# Patient Record
Sex: FEMALE | Race: WHITE | NOT HISPANIC OR LATINO | Employment: FULL TIME | URBAN - METROPOLITAN AREA
[De-identification: names, ages, dates, MRNs, and addresses within clinical notes are randomized per-mention and may not be internally consistent; named-entity substitution may affect disease eponyms.]

---

## 2017-08-14 ENCOUNTER — OFFICE VISIT (OUTPATIENT)
Dept: FAMILY MEDICINE | Facility: CLINIC | Age: 22
End: 2017-08-14
Payer: COMMERCIAL

## 2017-08-14 VITALS
WEIGHT: 133.5 LBS | DIASTOLIC BLOOD PRESSURE: 60 MMHG | RESPIRATION RATE: 12 BRPM | OXYGEN SATURATION: 99 % | SYSTOLIC BLOOD PRESSURE: 102 MMHG | HEIGHT: 61 IN | HEART RATE: 61 BPM | TEMPERATURE: 97.8 F | BODY MASS INDEX: 25.2 KG/M2

## 2017-08-14 DIAGNOSIS — G43.009 MIGRAINE WITHOUT AURA AND WITHOUT STATUS MIGRAINOSUS, NOT INTRACTABLE: ICD-10-CM

## 2017-08-14 PROCEDURE — 99213 OFFICE O/P EST LOW 20 MIN: CPT | Performed by: PHYSICIAN ASSISTANT

## 2017-08-14 RX ORDER — SUMATRIPTAN 100 MG/1
100 TABLET, FILM COATED ORAL
Qty: 9 TABLET | Refills: 1 | Status: SHIPPED | OUTPATIENT
Start: 2017-08-14 | End: 2018-08-29

## 2017-08-14 ASSESSMENT — ANXIETY QUESTIONNAIRES
3. WORRYING TOO MUCH ABOUT DIFFERENT THINGS: NEARLY EVERY DAY
1. FEELING NERVOUS, ANXIOUS, OR ON EDGE: NEARLY EVERY DAY
6. BECOMING EASILY ANNOYED OR IRRITABLE: MORE THAN HALF THE DAYS
7. FEELING AFRAID AS IF SOMETHING AWFUL MIGHT HAPPEN: NOT AT ALL
5. BEING SO RESTLESS THAT IT IS HARD TO SIT STILL: NOT AT ALL
2. NOT BEING ABLE TO STOP OR CONTROL WORRYING: NEARLY EVERY DAY
GAD7 TOTAL SCORE: 12

## 2017-08-14 ASSESSMENT — PATIENT HEALTH QUESTIONNAIRE - PHQ9
5. POOR APPETITE OR OVEREATING: SEVERAL DAYS
SUM OF ALL RESPONSES TO PHQ QUESTIONS 1-9: 9

## 2017-08-14 NOTE — MR AVS SNAPSHOT
After Visit Summary   8/14/2017    Lynn Vaca    MRN: [id number]           Patient Information     Date Of Birth          1995        Visit Information        Provider Department      8/14/2017 9:40 AM Daniella Nuñez PA-C [facility]        Today's Diagnoses     Migraine without aura and without status migrainosus, not intractable          Care Instructions      * Migraine Headache  Migraine headaches are related to changes in blood flow to the brain. This causes throbbing or constant pain on one or both sides of the head. The pain may last from a few hours to several days. There is usually nausea, vomiting, sensitivity to light and sound, and blurred vision. A migraine attack may be triggered by emotional stress, hormone changes during the menstrual cycle, oral contraceptives, alcohol use, certain foods containing tyramine, eye strain, weather changes, missing meals, or too little or too much sleep.  Home Care For This Headache:  1) If you were given pain medicine for this headache, do not drive yourself home . Arrange for a ride, instead. When you get home, try to sleep. You should feel much better when you wake up.  2) Migraine headaches may improve with an ice pack on the forehead or at the base of the skull. Heat to the back of your neck may relieve any neck spasm.  3) Drink only clear liquids or eat a very light diet to avoid nausea/vomiting until symptoms improve.  Preventing Future Headaches:  1) Pay attention to those factors that seem to trigger your headache. Try to avoid them when you can. If you have frequent headaches, it is useful to keep a diary of what you were doing, feeling or eating in the hours before each attack. Show this to your doctor to help find the cause of your headaches.  a) If you feel that stress is a factor in your headaches, look at the sources of stress in your life. Find ways to release the build-up of those stresses by using  regular exercise, relaxation methods (yoga, meditation), bio-feedback or simply taking time-out for yourself. For more information about this, consult your doctor or go to a local bookstore and review books and tapes on this subject.  b) Tyramine is a substance present in the following foods : chocolate, yogurt, all cheeses except cottage cheese and cream cheese. smoked or pickled fish and meat (including herring, caviar, bologna, pepperoni, salami), liver, avocados, bananas, figs, raisins, and red wine. Be aware that these foods may trigger a migraine in some persons. Try taking these foods out of your diet for 1-2 months to see if this reduces headache frequency.  Treating Future Attacks:  1) At the first sign of a migraine headache, take a medicine to stop it if one has been prescribed for you. If not, take acetaminophen (Tylenol) or ibuprofen (Motrin, Advil) if you are able to take these. The sooner you take medicine, the better it will work.  2) You may also want to find a quiet, dark, comfortable place to sit or lie down. Let yourself relax or sleep.  3) An ice pack on the forehead or area of greatest pain may also help.   Follow Up  with your doctor if the headache is not better within the next 24 hours. If you have frequent headaches you should discuss a treatment plan with your primary care doctor. Ask if you can have medicine to take at home the next time you get a bad headache. Poorly controlled chronic headaches may require a referral to a neurologist (headache specialist).  Get Prompt Medical Attention  if any of the following occur:    Your head pain gets worse, or does not improve within 24 hours    Repeated vomiting (can t keep liquids down)    Sinus or ear or throat pain (not already reported)    Fever of 101  F (38.3  C) or higher, or as directed by your healthcare provider    Stiff neck    Extreme drowsiness, confusion or fainting    Weakness of an arm or leg or one side of the face    Difficulty  with speech or vision    0935-0122 [employer]. [address] [place] [address], [place] [address]. All rights reserved. This information is not intended as a substitute for professional medical care. Always follow your healthcare professional's instructions.          Follow-ups after your visit        Additional Services     NEUROLOGY ADULT REFERRAL       Your provider has referred you for the following:   Consult at [facility]: [facility] [phone]   [email or web address]  FM: [facility] [phone]   [email or web address]    Please be aware that coverage of these services is subject to the terms and limitations of your health insurance plan.  Call member services at your health plan with any benefit or coverage questions.      Please bring the following with you to your appointment:    (1) Any X-Rays, CTs or MRIs which have been performed.  Contact the facility where they were done to arrange for  prior to your scheduled appointment.    (2) List of current medications  (3) This referral request   (4) Any documents/labs given to you for this referral                  Who to contact     If you have questions or need follow up information about today's clinic visit or your schedule please contact [facility] directly at [phone].  Normal or non-critical lab and imaging results will be communicated to you by MyChart, letter or phone within 4 business days after the clinic has received the results. If you do not hear from us within 7 days, please contact the clinic through MyChart or phone. If you have a critical or abnormal lab result, we will notify you by phone as soon as possible.  Submit refill requests through [employer] or call your pharmacy and they will forward the refill request to us. Please allow 3 business days for your refill to be completed.           "Additional Information About Your Visit        MyChart Information     GateMe lets you send messages to your doctor, view your test results, renew your prescriptions, schedule appointments and more. To sign up, go to [email or web address] . Click on \"Log in\" on the left side of the screen, which will take you to the Welcome page. Then click on \"Sign up Now\" on the right side of the page.     You will be asked to enter the access code listed below, as well as some personal information. Please follow the directions to create your username and password.     Your access code is: [email or web address]  Expires: 2017 10:08 AM     Your access code will  in 90 days. If you need help or a new code, please call your [place] clinic or [phone].        Care EveryWhere ID     This is your Care EveryWhere ID. This could be used by other organizations to access your [place] medical records  [id number]        Your Vitals Were     Pulse Temperature Respirations Height Pulse Oximetry BMI (Body Mass Index)    61 97.8  F (36.6  C) (Tympanic) 12 5' 0.6\" (1.539 m) 99% 25.56 kg/m2       Blood Pressure from Last 3 Encounters:   17 102/60   12/15/16 105/70   11/15/16 115/78    Weight from Last 3 Encounters:   17 133 lb 8 oz (60.6 kg)   12/15/16 153 lb (69.4 kg)   11/15/16 153 lb 9.6 oz (69.7 kg)              We Performed the Following     NEUROLOGY ADULT REFERRAL          Today's Medication Changes          These changes are accurate as of: 17 10:09 AM.  If you have any questions, ask your nurse or doctor.               Start taking these medicines.        Dose/Directions    SUMAtriptan 100 MG tablet   Commonly known as:  IMITREX   Used for:  Migraine without aura and without status migrainosus, not intractable   Started by:  Daniella Nuñez PA-C        Dose:  100 mg   Take 1 tablet (100 mg) by mouth at onset of headache for migraine May repeat dose in 2 hours.  Do not exceed 200 mg in 24 hours "   Quantity:  9 tablet   Refills:  1         Stop taking these medicines if you haven't already. Please contact your care team if you have questions.     hydrocortisone valerate 0.2 % ointment   Commonly known as:  WEST-DONALD   Stopped by:  Daniella Nuñez PA-C                Where to get your medicines      These medications were sent to [facility] [place], [place] - [address]  [address] [facility] [address]     Phone:  [phone]     SUMAtriptan 100 MG tablet                Primary Care Provider Office Phone # Fax #    Zulema Valdez [phone] [address]  [facility] [address]        Equal Access to Services     STACY BANERJEE : Hadii lamine lópez hadasho Soomaali, waaxda luqadaha, qaybta kaalmada adeegyada, waxjulio cesar kwon . So [facility] [phone].    ATENCIÓN: Si habla español, tiene a walls disposición servicios gratuitos de asistencia lingüística. [facility] [phone].    We comply with applicable federal civil rights laws and [place] laws. We do not discriminate on the basis of race, color, national origin, age, disability sex, sexual orientation or gender identity.            Thank you!     Thank you for choosing [facility]  for your care. Our goal is always to provide you with excellent care. Hearing back from our patients is one way we can continue to improve our services. Please take a few minutes to complete the written survey that you may receive in the mail after your visit with us. Thank you!             Your Updated Medication List - Protect others around you: Learn how to safely use, store and throw away your medicines at www.disposemymeds.org.          This list is accurate as of: 8/14/17 10:09 AM.  Always use your most recent med list.                   Brand Name Dispense Instructions for use Diagnosis    SUMAtriptan 100 MG tablet    IMITREX    9 tablet    Take 1 tablet  (100 mg) by mouth at onset of headache for migraine May repeat dose in 2 hours.  Do not exceed 200 mg in 24 hours    Migraine without aura and without status migrainosus, not intractable

## 2017-08-14 NOTE — PATIENT INSTRUCTIONS
* Migraine Headache  Migraine headaches are related to changes in blood flow to the brain. This causes throbbing or constant pain on one or both sides of the head. The pain may last from a few hours to several days. There is usually nausea, vomiting, sensitivity to light and sound, and blurred vision. A migraine attack may be triggered by emotional stress, hormone changes during the menstrual cycle, oral contraceptives, alcohol use, certain foods containing tyramine, eye strain, weather changes, missing meals, or too little or too much sleep.  Home Care For This Headache:  1) If you were given pain medicine for this headache, do not drive yourself home . Arrange for a ride, instead. When you get home, try to sleep. You should feel much better when you wake up.  2) Migraine headaches may improve with an ice pack on the forehead or at the base of the skull. Heat to the back of your neck may relieve any neck spasm.  3) Drink only clear liquids or eat a very light diet to avoid nausea/vomiting until symptoms improve.  Preventing Future Headaches:  1) Pay attention to those factors that seem to trigger your headache. Try to avoid them when you can. If you have frequent headaches, it is useful to keep a diary of what you were doing, feeling or eating in the hours before each attack. Show this to your doctor to help find the cause of your headaches.  a) If you feel that stress is a factor in your headaches, look at the sources of stress in your life. Find ways to release the build-up of those stresses by using regular exercise, relaxation methods (yoga, meditation), bio-feedback or simply taking time-out for yourself. For more information about this, consult your doctor or go to a local bookstore and review books and tapes on this subject.  b) Tyramine is a substance present in the following foods : chocolate, yogurt, all cheeses except cottage cheese and cream cheese. smoked or pickled fish and meat (including herring,  caviar, bologna, pepperoni, salami), liver, avocados, bananas, figs, raisins, and red wine. Be aware that these foods may trigger a migraine in some persons. Try taking these foods out of your diet for 1-2 months to see if this reduces headache frequency.  Treating Future Attacks:  1) At the first sign of a migraine headache, take a medicine to stop it if one has been prescribed for you. If not, take acetaminophen (Tylenol) or ibuprofen (Motrin, Advil) if you are able to take these. The sooner you take medicine, the better it will work.  2) You may also want to find a quiet, dark, comfortable place to sit or lie down. Let yourself relax or sleep.  3) An ice pack on the forehead or area of greatest pain may also help.   Follow Up  with your doctor if the headache is not better within the next 24 hours. If you have frequent headaches you should discuss a treatment plan with your primary care doctor. Ask if you can have medicine to take at home the next time you get a bad headache. Poorly controlled chronic headaches may require a referral to a neurologist (headache specialist).  Get Prompt Medical Attention  if any of the following occur:    Your head pain gets worse, or does not improve within 24 hours    Repeated vomiting (can t keep liquids down)    Sinus or ear or throat pain (not already reported)    Fever of 101  F (38.3  C) or higher, or as directed by your healthcare provider    Stiff neck    Extreme drowsiness, confusion or fainting    Weakness of an arm or leg or one side of the face    Difficulty with speech or vision    7588-0864 [employer]. [address] [place] [address], [place] [address]. All rights reserved. This information is not intended as a substitute for professional medical care. Always follow your healthcare professional's instructions.

## 2017-08-14 NOTE — NURSING NOTE
"Chief Complaint   Patient presents with     Headache       Initial /60 (BP Location: Left arm, Patient Position: Chair, Cuff Size: Adult Regular)  Pulse 61  Temp 97.8  F (36.6  C) (Tympanic)  Resp 12  Ht 5' 0.6\" (1.539 m)  Wt 133 lb 8 oz (60.6 kg)  SpO2 99%  BMI 25.56 kg/m2 Estimated body mass index is 25.56 kg/(m^2) as calculated from the following:    Height as of this encounter: 5' 0.6\" (1.539 m).    Weight as of this encounter: 133 lb 8 oz (60.6 kg).  Medication Reconciliation: complete     Emelina Contrears, TRAVON      "

## 2017-08-14 NOTE — PROGRESS NOTES
SUBJECTIVE:                                                    Lynn Vaca is a 21 year old female who presents to clinic today for the following health issues:      Migraine Follow-Up    Headaches symptoms:  Worsened current migraine for over 1 week    Frequency: 1-2 times per month     Duration of headaches: 1 day typically    Able to do normal daily activities/work with migraines: Yes    Rescue/Relief medication:Excedrin              Effectiveness: moderate relief    Preventative medication: None    Neurologic complications: No new stroke-like symptoms, loss of vision or speech, numbness or weakness    In the past 4 weeks, how often have you gone to Urgent Care or the emergency room because of your headaches?  0      Current symptoms are classic for her migraine but just not going away.  History of taking excedrin and NSAIDS (ibuprofen, advil, aleve type products) with only temporary relief.  Patient has tried Imitrex, but only 50 mg and didn't seem to work after a while.  Patient history of being on topamax for awhile but then didn't work anymore as well.  Patient has never seen neurology in the past either.        Problem list and histories reviewed & adjusted, as indicated.  Additional history: as documented    Patient Active Problem List   Diagnosis     Obsessive-compulsive disorder     Attention deficit disorder     Eczema     Common migraine     Generalized anxiety disorder     Past Surgical History:   Procedure Laterality Date     HERNIA REPAIR, INGUINAL RT/LT  2002       Social History   Substance Use Topics     Smoking status: Never Smoker     Smokeless tobacco: Never Used     Alcohol use No     Family History   Problem Relation Age of Onset     Arthritis Mother      at age 22     DIABETES Maternal Grandmother      type II         Current Outpatient Prescriptions   Medication Sig Dispense Refill     SUMAtriptan (IMITREX) 100 MG tablet Take 1 tablet (100 mg) by mouth at onset of headache for  "migraine May repeat dose in 2 hours.  Do not exceed 200 mg in 24 hours 9 tablet 1     Allergies   Allergen Reactions     Bactrim [Sulfamethoxazole W-Trimethoprim] Itching and Swelling     11/27/13 - itching and swelling.         Reviewed and updated as needed this visit by clinical staff  Tobacco  Allergies  Meds  Med Hx  Surg Hx  Fam Hx  Soc Hx      Reviewed and updated as needed this visit by Provider         ROS:  Constitutional, HEENT, cardiovascular, pulmonary, gi and gu systems are negative, except as otherwise noted.      OBJECTIVE:   /60 (BP Location: Left arm, Patient Position: Chair, Cuff Size: Adult Regular)  Pulse 61  Temp 97.8  F (36.6  C) (Tympanic)  Resp 12  Ht 5' 0.6\" (1.539 m)  Wt 133 lb 8 oz (60.6 kg)  SpO2 99%  BMI 25.56 kg/m2  Body mass index is 25.56 kg/(m^2).  GENERAL: healthy, alert and no distress  EYES: Eyes grossly normal to inspection, PERRL and conjunctivae and sclerae normal  HENT: ear canals and TM's normal, nose and mouth without ulcers or lesions  NECK: no adenopathy, no asymmetry, masses, or scars and thyroid normal to palpation  RESP: lungs clear to auscultation - no rales, rhonchi or wheezes  CV: regular rate and rhythm, normal S1 S2, no S3 or S4, no murmur, click or rub, no peripheral edema and peripheral pulses strong  NEURO: Normal strength and tone, mentation intact and speech normal  PSYCH: mentation appears normal, affect normal/bright    Diagnostic Test Results:  none     ASSESSMENT/PLAN:       ICD-10-CM    1. Migraine without aura and without status migrainosus, not intractable G43.009 SUMAtriptan (IMITREX) 100 MG tablet     NEUROLOGY ADULT REFERRAL       Patient Instructions     * Migraine Headache  Migraine headaches are related to changes in blood flow to the brain. This causes throbbing or constant pain on one or both sides of the head. The pain may last from a few hours to several days. There is usually nausea, vomiting, sensitivity to light and " sound, and blurred vision. A migraine attack may be triggered by emotional stress, hormone changes during the menstrual cycle, oral contraceptives, alcohol use, certain foods containing tyramine, eye strain, weather changes, missing meals, or too little or too much sleep.  Home Care For This Headache:  1) If you were given pain medicine for this headache, do not drive yourself home . Arrange for a ride, instead. When you get home, try to sleep. You should feel much better when you wake up.  2) Migraine headaches may improve with an ice pack on the forehead or at the base of the skull. Heat to the back of your neck may relieve any neck spasm.  3) Drink only clear liquids or eat a very light diet to avoid nausea/vomiting until symptoms improve.  Preventing Future Headaches:  1) Pay attention to those factors that seem to trigger your headache. Try to avoid them when you can. If you have frequent headaches, it is useful to keep a diary of what you were doing, feeling or eating in the hours before each attack. Show this to your doctor to help find the cause of your headaches.  a) If you feel that stress is a factor in your headaches, look at the sources of stress in your life. Find ways to release the build-up of those stresses by using regular exercise, relaxation methods (yoga, meditation), bio-feedback or simply taking time-out for yourself. For more information about this, consult your doctor or go to a local bookstore and review books and tapes on this subject.  b) Tyramine is a substance present in the following foods : chocolate, yogurt, all cheeses except cottage cheese and cream cheese. smoked or pickled fish and meat (including herring, caviar, bologna, pepperoni, salami), liver, avocados, bananas, figs, raisins, and red wine. Be aware that these foods may trigger a migraine in some persons. Try taking these foods out of your diet for 1-2 months to see if this reduces headache frequency.  Treating Future  Attacks:  1) At the first sign of a migraine headache, take a medicine to stop it if one has been prescribed for you. If not, take acetaminophen (Tylenol) or ibuprofen (Motrin, Advil) if you are able to take these. The sooner you take medicine, the better it will work.  2) You may also want to find a quiet, dark, comfortable place to sit or lie down. Let yourself relax or sleep.  3) An ice pack on the forehead or area of greatest pain may also help.   Follow Up  with your doctor if the headache is not better within the next 24 hours. If you have frequent headaches you should discuss a treatment plan with your primary care doctor. Ask if you can have medicine to take at home the next time you get a bad headache. Poorly controlled chronic headaches may require a referral to a neurologist (headache specialist).  Get Prompt Medical Attention  if any of the following occur:    Your head pain gets worse, or does not improve within 24 hours    Repeated vomiting (can t keep liquids down)    Sinus or ear or throat pain (not already reported)    Fever of 101  F (38.3  C) or higher, or as directed by your healthcare provider    Stiff neck    Extreme drowsiness, confusion or fainting    Weakness of an arm or leg or one side of the face    Difficulty with speech or vision    4903-1699 [employer]. [address] [place] [address], [place] [address]. All rights reserved. This information is not intended as a substitute for professional medical care. Always follow your healthcare professional's instructions.      Daniella Nuñez PA-C  [facility]

## 2017-08-15 ASSESSMENT — ANXIETY QUESTIONNAIRES: GAD7 TOTAL SCORE: 12

## 2018-08-28 NOTE — PROGRESS NOTES
"SUBJECTIVE:  Lynn Vaca, a 22 year old female, is here to discuss the following issues:     LUMP  Top of left shoulder x months (maybe up to a year).  No change.  No pain but tender to pressure.  No itching.  Not red or inflamed.      MIGRAINE FOLLOW-UP  Headaches symptoms:  Stable   Frequency: couple times per week   Duration of headaches: hours  Triggers: stress and anxiety  Able to do normal daily activities/work with migraines: sometimes  Rescue/Relief medication: Excedrin Migraine             Effectiveness: total relief but feels jittery from the caffeine  Preventative medication: None but is interested in exploring botox.  In the past 4 weeks, how often have you gone to Urgent Care or the emergency room because of your headaches?  0   Migraine self care assessment:    Caffeine:  20 oz sodas/day    Alcohol:  None       Problem list and histories reviewed & updated, as indicated.  Patient Active Problem List   Diagnosis     Obsessive-compulsive disorder     Attention deficit disorder     Eczema     Common migraine     Generalized anxiety disorder       BP Readings from Last 3 Encounters:   08/29/18 103/69   08/14/17 102/60   12/15/16 105/70    Wt Readings from Last 3 Encounters:   08/29/18 129 lb (58.5 kg)   08/14/17 133 lb 8 oz (60.6 kg)   12/15/16 153 lb (69.4 kg)           ROS:  RESP: NEGATIVE for shortness of breath  CV: NEGATIVE for chest pain    OBJECTIVE:    /69 (BP Location: Left arm, Patient Position: Sitting, Cuff Size: Adult Regular)  Pulse 55  Temp 98.1  F (36.7  C) (Oral)  Resp 16  Ht 5' 1\" (1.549 m)  Wt 129 lb (58.5 kg)  SpO2 100%  BMI 24.37 kg/m2  GEN:  no apparent distress  EYES: PERRL, conjunctivae and sclerae clear   SKIN: Clear. Small, firm intradermal lump along overlying upper border of left trapezius  NEURO:  No focal deficits noted, No facial asymmetry, Equal movement of all 4 extremities, Strength grossly intact       ASSESSMENT/PLAN:  1. EIC (epidermal inclusion " cyst)  Discussed relevant anatomy, pathophysiology, and etiology of this condition.  Discussed that lesion is benign but at risk for inflammation which is associated with sudden enlargement and redness of the cyst and can be quite painful. Discussed that best treatment to avoid inflammatory episode is excision by dermatology.  That must be done when cyst is non-inflamed and involves removal of the entire, intact cyst as disruption of the cyst is associated with an increased risk of recurrence.  I made referral to dermatology for further discussion of excision.   - DERMATOLOGY REFERRAL    2. Migraine without aura and without status migrainosus, not intractable  She will see Neurology to discuss botox.    - NEUROLOGY ADULT REFERRAL    3. Need for prophylactic vaccination and inoculation against influenza  - FLU VACCINE, SPLIT VIRUS, IM (QUADRIVALENT) [87947]- >3 YRS  - Vaccine Administration, Initial [75231]       Zulema Valdez MD   [facility]

## 2018-08-29 ENCOUNTER — OFFICE VISIT (OUTPATIENT)
Dept: FAMILY MEDICINE | Facility: CLINIC | Age: 23
End: 2018-08-29
Payer: COMMERCIAL

## 2018-08-29 VITALS
RESPIRATION RATE: 16 BRPM | HEIGHT: 61 IN | SYSTOLIC BLOOD PRESSURE: 103 MMHG | OXYGEN SATURATION: 100 % | WEIGHT: 129 LBS | DIASTOLIC BLOOD PRESSURE: 69 MMHG | TEMPERATURE: 98.1 F | BODY MASS INDEX: 24.35 KG/M2 | HEART RATE: 55 BPM

## 2018-08-29 DIAGNOSIS — G43.009 MIGRAINE WITHOUT AURA AND WITHOUT STATUS MIGRAINOSUS, NOT INTRACTABLE: ICD-10-CM

## 2018-08-29 DIAGNOSIS — Z23 NEED FOR PROPHYLACTIC VACCINATION AND INOCULATION AGAINST INFLUENZA: ICD-10-CM

## 2018-08-29 DIAGNOSIS — L72.0 EIC (EPIDERMAL INCLUSION CYST): Primary | ICD-10-CM

## 2018-08-29 PROCEDURE — 90686 IIV4 VACC NO PRSV 0.5 ML IM: CPT | Performed by: FAMILY MEDICINE

## 2018-08-29 PROCEDURE — 99213 OFFICE O/P EST LOW 20 MIN: CPT | Mod: 25 | Performed by: FAMILY MEDICINE

## 2018-08-29 PROCEDURE — 90471 IMMUNIZATION ADMIN: CPT | Performed by: FAMILY MEDICINE

## 2018-08-29 NOTE — MR AVS SNAPSHOT
After Visit Summary   8/29/2018    Lynn Vaca    MRN: [id number]           Patient Information     Date Of Birth          1995        Visit Information        Provider Department      8/29/2018 10:20 AM Zulema Valdez MD [facility]        Today's Diagnoses     Migraine without aura and without status migrainosus, not intractable    -  1    EIC (epidermal inclusion cyst)           Follow-ups after your visit        Additional Services     DERMATOLOGY REFERRAL       Your provider has referred you to: [facility]: [facility] [place] [phone]   [email or web address]    Please be aware that coverage of these services is subject to the terms and limitations of your health insurance plan.  Call member services at your health plan with any benefit or coverage questions.      Please bring the following to your appointment:  Any x-rays, CTs or MRIs which have been performed.  Contact the facility where they were done to arrange for  prior to your scheduled appointment.  Any new CT, MRI or other procedures ordered by your specialist must be performed at a [facility] or coordinated by your clinic's referral office.    List of current medications   This referral request   Any documents/labs given to you for this referral            NEUROLOGY ADULT REFERRAL       Your provider has referred you to: [facility]: [facility] [phone]   [email or web address]    Please be aware that coverage of these services is subject to the terms and limitations of your health insurance plan.  Call member services at your health plan with any benefit or coverage questions.      Please bring the following to your appointment:    >>   Any x-rays, CTs or MRIs which have been performed.  Contact the facility where they were done to arrange for  prior to your scheduled appointment.  Any new CT, MRI or other procedures  "ordered by your specialist must be performed at a [facility] facility or coordinated by your clinic's referral office.    >>   List of current medications   >>   This referral request   >>   Any documents/labs given to you for this referral                  Who to contact     If you have questions or need follow up information about today's clinic visit or your schedule please contact [facility] directly at [phone].  Normal or non-critical lab and imaging results will be communicated to you by MyChart, letter or phone within 4 business days after the clinic has received the results. If you do not hear from us within 7 days, please contact the clinic through [employer] or phone. If you have a critical or abnormal lab result, we will notify you by phone as soon as possible.  Submit refill requests through [email or web address] or call your pharmacy and they will forward the refill request to us. Please allow 3 business days for your refill to be completed.          Additional Information About Your Visit        TagitoharAsterion Information     [email or web address] lets you send messages to your doctor, view your test results, renew your prescriptions, schedule appointments and more. To sign up, go to [email or web address] . Click on \"Log in\" on the left side of the screen, which will take you to the Welcome page. Then click on \"Sign up Now\" on the right side of the page.     You will be asked to enter the access code listed below, as well as some personal information. Please follow the directions to create your username and password.     Your access code is: [email or web address]  Expires: 2018  1:06 PM     Your access code will  in 90 days. If you need help or a new code, please call your [facility] clinic or [phone].        Care EveryWhere ID     This is your Care EveryWhere ID. This could be used by other organizations to access your [facility] medical records  [id number]        Your Vitals Were     Pulse Temperature " "Respirations Height Pulse Oximetry BMI (Body Mass Index)    55 98.1  F (36.7  C) (Oral) 16 5' 1\" (1.549 m) 100% 24.37 kg/m2       Blood Pressure from Last 3 Encounters:   08/29/18 103/69   08/14/17 102/60   12/15/16 105/70    Weight from Last 3 Encounters:   08/29/18 129 lb (58.5 kg)   08/14/17 133 lb 8 oz (60.6 kg)   12/15/16 153 lb (69.4 kg)              We Performed the Following     DERMATOLOGY REFERRAL     NEUROLOGY ADULT REFERRAL          Today's Medication Changes          These changes are accurate as of 8/29/18 10:50 AM.  If you have any questions, ask your nurse or doctor.               Stop taking these medicines if you haven't already. Please contact your care team if you have questions.     SUMAtriptan 100 MG tablet   Commonly known as:  IMITREX   Stopped by:  Zulema Valdez MD                    Primary Care Provider Office Phone # Fax #    Zulema Valdez [phone] [address] [address]        Equal Access to Services     [facility]: Hadii lamine lópez hademily Sodustin, waaxda lublossom, qaybta kaalmada addison, dimitrios kwon . So [facility] [phone].    ATENCIÓN: Si habla español, tiene a walls disposición servicios gratuitos de asistencia lingüística. [facility] [phone].    We comply with applicable federal civil rights laws and [place] laws. We do not discriminate on the basis of race, color, national origin, age, disability, sex, sexual orientation, or gender identity.            Thank you!     Thank you for choosing [facility]  for your care. Our goal is always to provide you with excellent care. Hearing back from our patients is one way we can continue to improve our services. Please take a few minutes to complete the written survey that you may receive in the mail after your visit with us. Thank you!             Your Updated Medication List - Protect others around you: Learn how to safely use, store and throw " away your medicines at www.disposemymeds.org.      Notice  As of 8/29/2018 10:50 AM    You have not been prescribed any medications.

## 2018-08-29 NOTE — PROGRESS NOTES

## 2019-02-22 ENCOUNTER — HOSPITAL ENCOUNTER (EMERGENCY)
Facility: CLINIC | Age: 24
Discharge: HOME | End: 2019-02-22
Attending: EMERGENCY MEDICINE | Admitting: EMERGENCY MEDICINE
Payer: COMMERCIAL

## 2019-02-22 VITALS
SYSTOLIC BLOOD PRESSURE: 114 MMHG | BODY MASS INDEX: 25.7 KG/M2 | OXYGEN SATURATION: 96 % | RESPIRATION RATE: 16 BRPM | WEIGHT: 136 LBS | TEMPERATURE: 98 F | DIASTOLIC BLOOD PRESSURE: 68 MMHG

## 2019-02-22 DIAGNOSIS — J06.9 VIRAL UPPER RESPIRATORY TRACT INFECTION WITH COUGH: ICD-10-CM

## 2019-02-22 PROCEDURE — 99282 EMERGENCY DEPT VISIT SF MDM: CPT | Performed by: EMERGENCY MEDICINE

## 2019-02-22 PROCEDURE — 99284 EMERGENCY DEPT VISIT MOD MDM: CPT | Mod: Z6 | Performed by: EMERGENCY MEDICINE

## 2019-02-22 RX ORDER — CODEINE PHOSPHATE AND GUAIFENESIN 10; 100 MG/5ML; MG/5ML
2 SOLUTION ORAL EVERY 4 HOURS PRN
Qty: 120 ML | Refills: 0 | Status: SHIPPED | OUTPATIENT
Start: 2019-02-22 | End: 2019-06-19

## 2019-02-22 RX ORDER — IBUPROFEN 200 MG
600 TABLET ORAL EVERY 4 HOURS PRN
COMMUNITY
End: 2019-06-19

## 2019-02-22 ASSESSMENT — ENCOUNTER SYMPTOMS
NAUSEA: 0
ARTHRALGIAS: 0
DIARRHEA: 0
VOMITING: 0
FEVER: 0
COUGH: 1
HEADACHES: 1
ABDOMINAL PAIN: 0
SORE THROAT: 1

## 2019-02-22 NOTE — DISCHARGE INSTRUCTIONS
Follow-up with your primary care provider.  Return the emergency department for any new or worsening symptoms.

## 2019-02-22 NOTE — ED PROVIDER NOTES
History     Chief Complaint   Patient presents with     Cough     cough x 4 days     HPI  Lynn Vaca is a 23 year old female with a history of anxiety, and ADHD who presents to the ED with complaints of a productive cough for 4 days. Her symptoms are accompanied with sore throat and headache. She reports difficulty breathing and some chest pain when coughing. She reports grey mucous. She denies fever and notes she's felt warm. She denies sick contacts or any recent travel other than [place]. She's taken ibuprofen for headache and sore throat with mild relief. She denies fever, nausea, vomiting, diarrhea, abdominal pain, ear pain or joint pain. 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 She is not on any medications.     I have reviewed the Medications, Allergies, Past Medical and Surgical History, and Social History in the [employer] system.  Past Medical History:   Diagnosis Date     ADHD (attention deficit hyperactivity disorder)     has been on concerta, strattera, and daytrana     Asperger's syndrome      Common migraine 4/17/2013     Eczema      Generalised anxiety disorder      Inguinal hernia without mention of obstruction or gangrene, unilateral or unspecified, (not specified as recurrent) 2002    Right     OCD (obsessive compulsive disorder)     was on luvox as a child     Oppositional defiant disorder of childhood or adolescence        Past Surgical History:   Procedure Laterality Date     HERNIA REPAIR, INGUINAL RT/LT  2002       Family History   Problem Relation Age of Onset     Arthritis Mother         at age 22     Diabetes Maternal Grandmother         type II       Social History     Tobacco Use     Smoking status: Never Smoker     Smokeless tobacco: Never Used   Substance Use Topics     Alcohol use: No       No current facility-administered medications for this encounter.      Current Outpatient Medications   Medication     guaiFENesin-codeine (ROBITUSSIN AC) 100-10 MG/5ML  solution     ibuprofen (ADVIL/MOTRIN) 200 MG tablet        Allergies   Allergen Reactions     Bactrim [Sulfamethoxazole W-Trimethoprim] Itching and Swelling     11/27/13 - itching and swelling.       Review of Systems   Constitutional: Negative for fever.   HENT: Positive for sore throat. Negative for ear pain.    Respiratory: Positive for cough (productive).    Cardiovascular: Positive for chest pain (when coughing).   Gastrointestinal: Negative for abdominal pain, diarrhea, nausea and vomiting.   Musculoskeletal: Negative for arthralgias.   Neurological: Positive for headaches.   All other systems reviewed and are negative.      Physical Exam   BP: 114/68  Heart Rate: 68  Temp: 98  F (36.7  C)  Resp: 16  Weight: 61.7 kg (136 lb)  SpO2: 96 %      Physical Exam   Constitutional: No distress.   HENT:   Head: Atraumatic.   Mouth/Throat: Oropharynx is clear and moist. No oropharyngeal exudate.   Eyes: EOM are normal. No scleral icterus.   Neck: Neck supple.   Cardiovascular: Normal rate, regular rhythm and normal heart sounds.   Pulmonary/Chest: Breath sounds normal. No respiratory distress.   Abdominal: Soft. She exhibits no distension. There is no tenderness.   Musculoskeletal: She exhibits no edema or deformity.   Neurological: She is alert.   Skin: Skin is warm and dry. She is not diaphoretic.   Psychiatric: She has a normal mood and affect. Her behavior is normal.   Vitals reviewed.      ED Course        Procedures             Labs Ordered and Resulted from Time of ED Arrival Up to the Time of Departure from the ED - No data to display         Assessments & Plan (with Medical Decision Making)   23-year-old female presents to us with a chief complaint of 4 days of cough.  Differential includes but not limited to influenza, pneumonia, viral illness, bronchitis.  Patient is afebrile at this point.  She does not have recollection of having a fever during the illness.  She appears relatively nontoxic and therefore we  deferred influenza testing as it is past time which there would be benefit for antivirals.  Chest exam is clear with no apparent respiratory distress or abnormal lung sounds.  Again she is afebrile so I have a low suspicion for pneumonia at this point.  She is intermittently coughing during the exam.  Ears and throat appear normal.  Suspect a viral illness is causing her symptoms based on this.  We will give her Robitussin-AC for symptomatic relief and recommended she follow-up with her primary care provider if she should worsen.  Is comfortable discharge home and the plan  I have reviewed the nursing notes.    I have reviewed the findings, diagnosis, plan and need for follow up with the patient.       Medication List      Started    guaiFENesin-codeine 100-10 MG/5ML solution  Commonly known as:  ROBITUSSIN AC  2 tsp., Oral, EVERY 4 HOURS PRN            Final diagnoses:   Viral upper respiratory tract infection with cough     Leyda ACOSTA, corona serving as a trained medical scribe to document services personally performed by  Chiki Moran DO, based on the provider's statements to me.      Chiki ACOSTA DO, was physically present and have reviewed and verified the accuracy of this note documented by Leyda Valencia.     2/22/2019   [facility], [place], EMERGENCY DEPARTMENT     Chiki Moran DO  02/22/19 [id number]

## 2019-02-22 NOTE — ED AVS SNAPSHOT
[facility], [place], Emergency Department  [address]  [place] [address]  Phone:  [phone]  Fax:  [phone]                                    Lynn Vaca   MRN: [id number]    Department:  [facility], Emergency Department   Date of Visit:  2/22/2019           After Visit Summary Signature Page    I have received my discharge instructions, and my questions have been answered. I have discussed any challenges I see with this plan with the nurse or doctor.    ..........................................................................................................................................  Patient/Patient Representative Signature      ..........................................................................................................................................  Patient Representative Print Name and Relationship to Patient    ..................................................               ................................................  Date                                   Time    ..........................................................................................................................................  Reviewed by Signature/Title    ...................................................              ..............................................  Date                                               Time          22EPIC Rev 08/18

## 2019-06-17 NOTE — PROGRESS NOTES
Subjective     Lynn Vaca is a 23 year old female who presents to clinic today with mother for the following health issues:      HPI   Anxiety Follow-Up  How are you doing with your anxiety since your last visit? Worsened a little    Are you having other symptoms that might be associated with anxiety? No    Have you had a significant life event? No     Are you feeling depressed? Yes    Do you have any concerns with your use of alcohol or other drugs? No     Feels mood and anxiety have worsened in the past year.  Has struggled with depression and anxiety for years.  Has been on fluoxetine in the past but had a hard time remembering to take medication regularly.  Has seen a therapist in the past and would like to try therapy again.  Prior therapist is no longer available.    Feels the increased anxiety/stress has also worsened her migraines.    Working at [employer].  Feels it has become monotonous/dull and would like to consider a job change.    Has travelled to [place] with friends and has another trip planned for October - she is looking forward to this    She walks a lot every day.    No suicidal ideation.  Feels safe.  Has hit her head out of frustration but not as attempt to cause serious personal injury.      Has a history of ADHD but hasn't had issues with attention/concentration lately.    Social History     Tobacco Use     Smoking status: Never Smoker     Smokeless tobacco: Never Used   Substance Use Topics     Alcohol use: No     Drug use: No     PALAK-7 SCORE 7/23/2015 8/14/2017 6/19/2019   Total Score 9 - -   Total Score - - 12 (moderate anxiety)   Total Score - 12 12     PHQ 8/14/2017 6/19/2019   PHQ-9 Total Score 9 8   Q9: Thoughts of better off dead/self-harm past 2 weeks Not at all Several days   F/U: Thoughts of suicide or self-harm - No   F/U: Safety concerns - No     PHQ-9 ([employer]) 6/19/2019   1.  Little interest or pleasure in doing things Several days   2.  Feeling down, depressed, or  "hopeless More than half the days   3.  Trouble falling or staying asleep, or sleeping too much Not at all   4.  Feeling tired or having little energy Several days   5.  Poor appetite or overeating Not at all   6.  Feeling bad about yourself Nearly every day   7.  Trouble concentrating Not at all   8.  Moving slowly or restless Not at all   9.  Suicidal or self-harm thoughts Several days   PHQ-9 via [employer] TOTAL SCORE-----> 8 (Mild depression)   Difficulty at work, home, or with people Somewhat difficult   F/U: Thoughts of suicide or self harm? No   F/U: Safety concerns for self or others? No     PALAK-7   [employer], 2002; Used with Permission) 6/19/2019   1. Feeling nervous, anxious, or on edge Nearly every day   2. Not being able to stop or control worrying Nearly every day   3. Worrying too much about different things Nearly every day   4. Trouble relaxing Not at all   5. Being so restless that it is hard to sit still Not at all   6. Becoming easily annoyed or irritable More than half the days   7. Feeling afraid, as if something awful might happen Several days   PALAK 7 TOTAL SCORE 12 (moderate anxiety)       BP Readings from Last 3 Encounters:   06/19/19 112/69   02/22/19 114/68   08/29/18 103/69    Wt Readings from Last 3 Encounters:   06/19/19 56.2 kg (124 lb)   02/22/19 61.7 kg (136 lb)   08/29/18 58.5 kg (129 lb)              Reviewed and updated as needed this visit by Provider  Meds  Problems         Review of Systems   ROS COMP: Constitutional, HEENT, cardiovascular, pulmonary, GI, , musculoskeletal, neuro, skin, endocrine and psych systems are negative, except as otherwise noted.      Objective    /69 (BP Location: Left arm, Patient Position: Sitting, Cuff Size: Adult Regular)   Pulse 60   Temp 98  F (36.7  C) (Oral)   Resp 14   Ht 1.549 m (5' 1\")   Wt 56.2 kg (124 lb)   LMP 06/12/2019   SpO2 98%   Breastfeeding? No   BMI 23.43 kg/m    Body mass index is 23.43 kg/m .  Physical Exam "   GEN:  no apparent distress  PSYCH:    Appearance: appropriately and casually dressed    Eye Contact: fair  Behavior: calm  Attitude: cooperative    Speech:  normal rate, rhythm, and tone    Thought Form: organized    Thought Content: no evidence of psychotic thought    Mood: euthymic    Affect: appropriate and in normal range    Insight: good             Assessment & Plan       ICD-10-CM    1. Generalized anxiety disorder F41.1 MENTAL HEALTH REFERRAL  - Adult; Outpatient Treatment; Individual/Couples/Family/Group Therapy/Health Psychology; [facility]: [facility] [phone]; We will contact you to schedule the appointment or please call with any questions   2. Mild recurrent major depression (H) F33.0 MENTAL HEALTH REFERRAL  - Adult; Outpatient Treatment; Individual/Couples/Family/Group Therapy/Health Psychology; G: [facility] [phone]; We will contact you to schedule the appointment or please call with any questions     Discussed Depression Action Plan with emphasis on self-cares that support mood.  I reviewed crisis resources which I included on her DAP.      I think therapy is a good treatment option for her and I reviewed availability of Johana Duarte, [facility].  I also gave her a referral to [facility].      She is still engaged socially and in her employment and has support of mother.  These are all strengths/protective factors.        Zulema Valdez MD  [facility]

## 2019-06-19 ENCOUNTER — OFFICE VISIT (OUTPATIENT)
Dept: FAMILY MEDICINE | Facility: CLINIC | Age: 24
End: 2019-06-19
Payer: COMMERCIAL

## 2019-06-19 VITALS
WEIGHT: 124 LBS | SYSTOLIC BLOOD PRESSURE: 112 MMHG | OXYGEN SATURATION: 98 % | BODY MASS INDEX: 23.41 KG/M2 | DIASTOLIC BLOOD PRESSURE: 69 MMHG | TEMPERATURE: 98 F | HEIGHT: 61 IN | RESPIRATION RATE: 14 BRPM | HEART RATE: 60 BPM

## 2019-06-19 DIAGNOSIS — F41.1 GENERALIZED ANXIETY DISORDER: Primary | ICD-10-CM

## 2019-06-19 DIAGNOSIS — F33.0 MILD RECURRENT MAJOR DEPRESSION (H): ICD-10-CM

## 2019-06-19 PROCEDURE — 99213 OFFICE O/P EST LOW 20 MIN: CPT | Performed by: FAMILY MEDICINE

## 2019-06-19 ASSESSMENT — PATIENT HEALTH QUESTIONNAIRE - PHQ9
SUM OF ALL RESPONSES TO PHQ QUESTIONS 1-9: 8
10. IF YOU CHECKED OFF ANY PROBLEMS, HOW DIFFICULT HAVE THESE PROBLEMS MADE IT FOR YOU TO DO YOUR WORK, TAKE CARE OF THINGS AT HOME, OR GET ALONG WITH OTHER PEOPLE: SOMEWHAT DIFFICULT
SUM OF ALL RESPONSES TO PHQ QUESTIONS 1-9: 8

## 2019-06-19 ASSESSMENT — ANXIETY QUESTIONNAIRES
6. BECOMING EASILY ANNOYED OR IRRITABLE: MORE THAN HALF THE DAYS
GAD7 TOTAL SCORE: 12
3. WORRYING TOO MUCH ABOUT DIFFERENT THINGS: NEARLY EVERY DAY
GAD7 TOTAL SCORE: 12
5. BEING SO RESTLESS THAT IT IS HARD TO SIT STILL: NOT AT ALL
7. FEELING AFRAID AS IF SOMETHING AWFUL MIGHT HAPPEN: SEVERAL DAYS
4. TROUBLE RELAXING: NOT AT ALL
1. FEELING NERVOUS, ANXIOUS, OR ON EDGE: NEARLY EVERY DAY
GAD7 TOTAL SCORE: 12
2. NOT BEING ABLE TO STOP OR CONTROL WORRYING: NEARLY EVERY DAY
7. FEELING AFRAID AS IF SOMETHING AWFUL MIGHT HAPPEN: SEVERAL DAYS

## 2019-06-19 ASSESSMENT — MIFFLIN-ST. JEOR: SCORE: 1244.84

## 2019-06-19 NOTE — LETTER
My Depression Action Plan  Name: Lynn Vaca   Date of Birth 1995  Date: 6/19/2019    My doctor: Zulema Valdez   My clinic: [address] [address] [phone]          GREEN    ZONE   Good Control    What it looks like:     Things are going generally well. You have normal up s and down s. You may even feel depressed from time to time, but bad moods usually last less than a day.   What you need to do:  1. Continue to care for yourself (see self care plan)  2. Check your depression survival kit and update it as needed  3. Follow your physician s recommendations including any medication.  4. Do not stop taking medication unless you consult with your physician first.           YELLOW         ZONE Getting Worse    What it looks like:     Depression is starting to interfere with your life.     It may be hard to get out of bed; you may be starting to isolate yourself from others.    Symptoms of depression are starting to last most all day and this has happened for several days.     You may have suicidal thoughts but they are not constant.   What you need to do:     1. Call your care team, your response to treatment will improve if you keep your care team informed of your progress. Yellow periods are signs an adjustment may need to be made.     2. Continue your self-care, even if you have to fake it!    3. Talk to someone in your support network    4. Open up your depression survival kit           RED    ZONE Medical Alert - Get Help    What it looks like:     Depression is seriously interfering with your life.     You may experience these or other symptoms: You can t get out of bed most days, can t work or engage in other necessary activities, you have trouble taking care of basic hygiene, or basic responsibilities, thoughts of suicide or death that will not go away, self-injurious behavior.     What you need to do:  1. Call your care team  and request a same-day appointment. If they are not available (weekends or after hours) call your local crisis line, emergency room or 911.                                    Depression Self Care Plan / Survival Kit    Self-Care for Depression  Here s the deal. Your body and mind are really not as separate as most people think.  What you do and think affects how you feel and how you feel influences what you do and think. This means if you do things that people who feel good do, it will help you feel better.  Sometimes this is all it takes.  There is also a place for medication and therapy depending on how severe your depression is, so be sure to consult with your medical provider and/ or Behavioral Health Consultant if your symptoms are worsening or not improving.     In order to better manage my stress, I will:    Exercise  Get some form of exercise, every day. This will help reduce pain and release endorphins, the  feel good  chemicals in your brain. This is almost as good as taking antidepressants!  This is not the same as joining a gym and then never going! (they count on that by the way ) It can be as simple as just going for a walk or doing some gardening, anything that will get you moving.      Hygiene   Maintain good hygiene (Get out of bed in the morning, Make your bed, Brush your teeth, Take a shower, and Get dressed like you were going to work, even if you are unemployed).  If your clothes don't fit try to get ones that do.    Diet  I will strive to eat foods that are good for me, drink plenty of water, and avoid excessive sugar, caffeine, alcohol, and other mood-altering substances.  Some foods that are helpful in depression are: complex carbohydrates, B vitamins, flaxseed, fish or fish oil, fresh fruits and vegetables.    Psychotherapy  I agree to participate in Individual Therapy (if recommended).    Medication  If prescribed medications, I agree to take them.  Missing doses can result in serious side  effects.  I understand that drinking alcohol, or other illicit drug use, may cause potential side effects.  I will not stop my medication abruptly without first discussing it with my provider.    Staying Connected With Others  I will stay in touch with my friends, family members, and my primary care provider/team.    Use your imagination  Be creative.  We all have a creative side; it doesn t matter if it s oil painting, sand castles, or mud pies! This will also kick up the endorphins.    Witness Beauty  (AKA stop and smell the roses) Take a look outside, even in mid-winter. Notice colors, textures. Watch the squirrels and birds.     Service to others  Be of service to others.  There is always someone else in need.  By helping others we can  get out of ourselves  and remember the really important things.  This also provides opportunities for practicing all the other parts of the program.    Humor  Laugh and be silly!  Adjust your TV habits for less news and crime-drama and more comedy.    Control your stress  Try breathing deep, massage therapy, biofeedback, and meditation. Find time to relax each day.         Mental Health Resources    Crisis Connection [phone]    [facility] [phone]    [facility] [phone]     National Suicide Prevention 7-547-049-9433     Suicide Prevention [phone]       [facility] Emergency [facility]  [facility]  [address] [place] [address]  [phone]    [facility]  Acute Psychiatric Services (APS)   [address] [place] [address]   ([facility], 1st floor)  [phone]  Suicide Hotline: 237.943.9255    [facility]  [phone]    Crisis Text Line  http://www.crisistextline.org   The Crisis Text Line serves anyone, in any type of crisis, providing access to free, 24/7 support and information via the medium people already use and trust:     Here's how it  works:  1. Text 870-582 from anywhere in the [place], anytime, about any type of crisis.  2. A live, trained Crisis Counselor receives the text and responds quickly.  3. The volunteer Crisis Counselor will help you move from a 'hot moment to a cool moment'

## 2019-06-20 ASSESSMENT — ANXIETY QUESTIONNAIRES: GAD7 TOTAL SCORE: 12

## 2019-06-20 ASSESSMENT — PATIENT HEALTH QUESTIONNAIRE - PHQ9: SUM OF ALL RESPONSES TO PHQ QUESTIONS 1-9: 8

## 2019-07-11 ENCOUNTER — APPOINTMENT (OUTPATIENT)
Dept: GENERAL RADIOLOGY | Facility: CLINIC | Age: 24
End: 2019-07-11
Payer: COMMERCIAL

## 2019-07-11 ENCOUNTER — HOSPITAL ENCOUNTER (EMERGENCY)
Facility: CLINIC | Age: 24
Discharge: HOME | End: 2019-07-11
Attending: EMERGENCY MEDICINE | Admitting: EMERGENCY MEDICINE
Payer: COMMERCIAL

## 2019-07-11 VITALS
BODY MASS INDEX: 24.19 KG/M2 | WEIGHT: 128 LBS | HEART RATE: 70 BPM | SYSTOLIC BLOOD PRESSURE: 113 MMHG | OXYGEN SATURATION: 99 % | DIASTOLIC BLOOD PRESSURE: 62 MMHG | RESPIRATION RATE: 16 BRPM | TEMPERATURE: 96.6 F

## 2019-07-11 DIAGNOSIS — S51.832A: ICD-10-CM
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
## 2019-07-11 PROCEDURE — 96372 THER/PROPH/DIAG INJ SC/IM: CPT | Performed by: EMERGENCY MEDICINE

## 2019-07-11 PROCEDURE — 90375 RABIES IG IM/SC: CPT | Performed by: STUDENT IN AN ORGANIZED HEALTH CARE EDUCATION/TRAINING PROGRAM

## 2019-07-11 PROCEDURE — 99284 EMERGENCY DEPT VISIT MOD MDM: CPT | Mod: GC | Performed by: EMERGENCY MEDICINE

## 2019-07-11 PROCEDURE — 90471 IMMUNIZATION ADMIN: CPT | Performed by: EMERGENCY MEDICINE

## 2019-07-11 PROCEDURE — 99284 EMERGENCY DEPT VISIT MOD MDM: CPT | Mod: 25 | Performed by: EMERGENCY MEDICINE

## 2019-07-11 PROCEDURE — 73090 X-RAY EXAM OF FOREARM: CPT | Mod: LT

## 2019-07-11 PROCEDURE — 25000128 H RX IP 250 OP 636: Performed by: STUDENT IN AN ORGANIZED HEALTH CARE EDUCATION/TRAINING PROGRAM

## 2019-07-11 PROCEDURE — 90675 RABIES VACCINE IM: CPT | Performed by: STUDENT IN AN ORGANIZED HEALTH CARE EDUCATION/TRAINING PROGRAM

## 2019-07-11 RX ADMIN — RABIES VACCINE 1 ML: KIT at 23:06

## 2019-07-11 ASSESSMENT — ENCOUNTER SYMPTOMS
FEVER: 0
CHILLS: 0
WOUND: 1

## 2019-07-11 NOTE — LETTER
July 11, 2019      To Whom It May Concern:      Lynn Vaca was seen in our Emergency Department today, 07/11/19. She may return to work/school on 7/13/2019.    Sincerely,        Nancy Acosta MD

## 2019-07-11 NOTE — ED AVS SNAPSHOT
[facility], [place], Emergency Department  [address]  [facility] [address]  Phone:  [phone]  Fax:  [phone]                                    Lynn Vaca   MRN: [id number]    Department:  [facility], Emergency Department   Date of Visit:  7/11/2019           After Visit Summary Signature Page    I have received my discharge instructions, and my questions have been answered. I have discussed any challenges I see with this plan with the nurse or doctor.    ..........................................................................................................................................  Patient/Patient Representative Signature      ..........................................................................................................................................  Patient Representative Print Name and Relationship to Patient    ..................................................               ................................................  Date                                   Time    ..........................................................................................................................................  Reviewed by Signature/Title    ...................................................              ..............................................  Date                                               Time          22EPIC Rev 08/18

## 2019-07-12 NOTE — ED NOTES
Pt has a 1cm horse shoe shaped abrasion on the effected arm.  No bleeding noted.  FROM/CMS intact.

## 2019-07-12 NOTE — DISCHARGE INSTRUCTIONS
Please come back to the Emergency Department on July 14, July 18 and July 25 for further rabies vaccinations. Return to the Emergency Department if you develop fever of greater than 100.4 F or higher or you are having more pain, redness or swelling of your left arm.

## 2019-07-12 NOTE — ED PROVIDER NOTES
History     Chief Complaint   Patient presents with     Dog Bite     was out walking tonight 2 blocks from her house when suddenly a dog attacked her, dog was on a leash, unsure if it was a pitbull, attack was unprovoke, bite on L lower posterior forearm noted. Pt was not able to get any information on the dog - vaccination etc.     HPI  Lynn Vaca is a 23 year old healthy female who presents following an unprovoked dog bite. She states she was walking near her home when a dog on a leash bite her left forearm. She was unable to get any information from the dog owner but states she was not petting the dog or interacting with it in any way. She immediately felt pain in her arm, went home and told her mom who brought her to the ER for evaluation. She is having pain in her arm extending up to the shoulder. She feels like her arm is a little swollen but has not noticed any drainage from the bite wound. She is unsure of her tetanus status.    I have reviewed the Medications, Allergies, Past Medical and Surgical History, and Social History in the [facility] system.    Past Medical History:   Diagnosis Date     ADHD (attention deficit hyperactivity disorder)     has been on concerta, strattera, and daytrana     Asperger's syndrome      Common migraine 4/17/2013     Eczema      Generalised anxiety disorder      Inguinal hernia without mention of obstruction or gangrene, unilateral or unspecified, (not specified as recurrent) 2002    Right     OCD (obsessive compulsive disorder)     was on luvox as a child     Oppositional defiant disorder of childhood or adolescence      Social History     Socioeconomic History     Marital status: Single     Spouse name: Not on file     Number of children: Not on file     Years of education: Not on file     Highest education level: Not on file   Occupational History     Not on file   Social Needs     Financial resource strain: Not on file     Food insecurity:     Worry: Not on file      Inability: Not on file     Transportation needs:     Medical: Not on file     Non-medical: Not on file   Tobacco Use     Smoking status: Never Smoker     Smokeless tobacco: Never Used   Substance and Sexual Activity     Alcohol use: No     Drug use: No       Review of Systems   Constitutional: Negative for chills and fever.   Skin: Positive for wound.   Allergic/Immunologic: Negative for immunocompromised state.   All other systems reviewed and are negative.      Physical Exam   BP: 113/62  Pulse: 70  Temp: 96.6  F (35.9  C)  Resp: 16  Weight: 58.1 kg (128 lb)  SpO2: 99 %      Physical Exam   Constitutional: She appears well-developed and well-nourished. No distress.   HENT:   Head: Atraumatic.   Cardiovascular: Intact distal pulses.   Musculoskeletal: Normal range of motion.   Neurological: She is alert. No sensory deficit.   Skin: Skin is warm.   She has a puncture wound on the dorsal surface of her forearm that is mildly tender to palpation. She is neurovascularly intact.   Psychiatric: She has a normal mood and affect.   Nursing note and vitals reviewed.      ED Course   The wound was cleaned.       Assessments & Plan (with Medical Decision Making)   24 yo F presenting following an unprovoked dog bite. There are no wounds requiring sutures. The bite wound does not appear infected. Arm XR was negative for foreign body. On review of [facility] she received Tdap in 2016. She has not received prior rabies vaccination. She was given rabies immunoglobulin and rabies vaccination in the ER. She is stable for discharge from the ER. She was given instructions to return for subsequent vaccinations on days #3, 7, 14.     ----------------------------------------------------------------------------------------------------------------------------------------  STAFF ATTESTATION NOTE    HPI  23-year-old woman presenting after a dog bite.  This was an unprovoked bite and she is not familiar with the dog or the owner.  She is unsure of  any immunizations regarding the dog.  She does have some mild, throbbing, nonradiating, constant pain in the left forearm since the injury.  Unsure of her last tetanus status.  She has not taken any medications for symptomatic relief.  No fevers. Patient denies history of immunosuppression.    FOCUSED PHYSICAL EXAM  General: Patient sitting in bed awake in no acute distress  CV: 2+ radial pulse in left upper extremity  Skin: Ecchymosis with puncture wound to the posterior-medial aspect of left forearm with minimal tenderness to palpation.  No bleeding.  No laceration.  MSK: Full range of motion, active and passive, in left shoulder, elbow, and wrist without pain.    ASSESSMENT AND PLAN  23-year-old woman presenting with a dog bite.  I will obtain an x-ray for further evaluation for possible foreign body in the soft tissue.  State immunization database was checked and patient had tetanus updated 3 years ago and therefore she does not need another tetanus updated at this time.  She will be given first dose of rabies vaccination and she will also be administered rabies immunoglobulin intramuscularly.  She and her mother agree with the plan.  She will be discharged with instructions to return to the emergency department for subsequent rabies vaccinations on days: 3, 7, and 14.      I reviewed the patient's x-ray and I read the Radiology report; no fracture or foreign body noted.  I injected rabies immunoglobulin subcutaneously surrounding the patient's wound. The nurse administered the rabies vaccination in the opposite arm.  At this time, the patient is stable for discharge and she will return to the emergency department for further rabies series vaccinations.     I have reviewed the nursing notes. I have reviewed the findings, diagnosis, plan and need for follow up with the patient.    This part of the document was transcribed by Jeremy Vee, Medical Scribe.        Medication List      There are no discharge  medications for this visit.         Final diagnoses:   Dog bite     aNncy Acosta MD  7/11/2019   [facility], [place], EMERGENCY DEPARTMENT    I was physically present and have reviewed and verified the accuracy of this note documented by my scribe.    MD Sasha Cortes Nikola, MD  07/16/19 1923

## 2019-09-11 ENCOUNTER — OFFICE VISIT (OUTPATIENT)
Dept: BEHAVIORAL HEALTH | Facility: CLINIC | Age: 24
End: 2019-09-11
Payer: COMMERCIAL

## 2019-09-11 DIAGNOSIS — F84.5 ASPERGER'S DISORDER: Primary | ICD-10-CM

## 2019-09-11 PROCEDURE — 90791 PSYCH DIAGNOSTIC EVALUATION: CPT | Performed by: SOCIAL WORKER

## 2019-09-13 ENCOUNTER — TELEPHONE (OUTPATIENT)
Dept: BEHAVIORAL HEALTH | Facility: CLINIC | Age: 24
End: 2019-09-13

## 2019-09-13 NOTE — TELEPHONE ENCOUNTER
[facility]  No data recorded  Social Work Care Navigator Note  Patient: Lynn Vaca  Date: September 13, 2019  Preferred Name: Lynn  Previous PHQ-9:   PHQ-9 SCORE 10/24/2013 8/14/2017 6/19/2019   PHQ-9 Total Score 6 - -   PHQ-9 Total Score MyChart - - 8 (Mild depression)   PHQ-9 Total Score - 9 8   Previous PALAK-7:   PALAK-7 SCORE 7/23/2015 8/14/2017 6/19/2019   Total Score 9 - -   Total Score - - 12 (moderate anxiety)   Total Score - 12 12   RADHA LEVEL:  No flowsheet data found.  Preferred Contact:  No data recorded  Type of Contact Today: MyChart / Email (patient reached)      Data: (subjective / Objective):    [facility] Introduction:  Hi my name is TOMAS Alejandra from your (name) primary care clinic.     I work closely with your primary care provider, Zulema Valdez.     If it's ok I'd like to talk about some new services available to you, at no out of pocket cost to you.      Before we get started can you verify your insurance for me?     What social work or case management services do you receive? (If so, are you receiving ACT or TCM?).  No    Getting to Know You - Whole Person Care:  This new service is called Behavioral Health Home services, which is designed to support you as a whole person beyond just your medical needs.      Tell me about what types of things that are causing you stress OR impacting your quality of life?  Will discuss in person when able to schedule a face to face meeting in clinic.    I'm here to be a central point of contact for your healthcare needs and to help with:    Housing    Transportation    Financial resources    Comprehensive Health needs (appointment help, medication costs, etc.)    Employment    Education    Health Insurance applications    And connecting with social supports or community resources    Out of the things I mentioned what would you find helpful?  Per Behavioral Health Clinician employment; pt is working under her skill set, has 4 year  college degree and is working at [employer].    To get started:   If patient has a Diagnostic Assessment -     We'll work together on a brief assessment to better understand how we can help and then put together a plan to meet your needs.    If patient does not have a Diagnostic Assessment -   Behavioral Health Clinician is working on diagnostic assessment.   When you come into the clinic there will be a few forms for you to fill out in the lobby.    Patient response to [facility] Service offering:   Interested in enrolling in [facility] services and scheduled appt / will drop-in to complete the consent form and Brief Needs Assessment   Lynn will get her new work schedule today from [employer]. Then on Monday 9/16/2019 will call Social Work Care Coordinator desk phone to leave a message stating which day works for her to come in next week.    TOMAS Alejandra, Social Work Care Coordinator

## 2019-09-20 ASSESSMENT — COLUMBIA-SUICIDE SEVERITY RATING SCALE - C-SSRS
1. IN THE PAST MONTH, HAVE YOU WISHED YOU WERE DEAD OR WISHED YOU COULD GO TO SLEEP AND NOT WAKE UP?: NO
TOTAL  NUMBER OF ABORTED OR SELF INTERRUPTED ATTEMPTS PAST LIFETIME: NO
6. HAVE YOU EVER DONE ANYTHING, STARTED TO DO ANYTHING, OR PREPARED TO DO ANYTHING TO END YOUR LIFE?: NO
6. HAVE YOU EVER DONE ANYTHING, STARTED TO DO ANYTHING, OR PREPARED TO DO ANYTHING TO END YOUR LIFE?: NO
TOTAL  NUMBER OF INTERRUPTED ATTEMPTS LIFETIME: NO
TOTAL  NUMBER OF INTERRUPTED ATTEMPTS PAST 3 MONTHS: NO
ATTEMPT LIFETIME: NO
1. IN THE PAST MONTH, HAVE YOU WISHED YOU WERE DEAD OR WISHED YOU COULD GO TO SLEEP AND NOT WAKE UP?: YES
TOTAL  NUMBER OF ABORTED OR SELF INTERRUPTED ATTEMPTS PAST 3 MONTHS: NO
ATTEMPT PAST THREE MONTHS: NO

## 2019-09-20 NOTE — PROGRESS NOTES
[facility] Primary Care: Integrated Behavioral Health  Integrated Behavioral Health Services   Diagnostic Assessment      PATIENT'S NAME: Lynn Vaca  MRN:   [id number]  :   1995  DATE OF SERVICE: 2019  SERVICE LOCATION: Face to Face in Clinic  Visit Activities: NEW  \Clinical Global Impressions  First:  Considering your total clinical experience with this particular patient population, how severe are the patient's symptoms at this time?: 4 (19 1232)  Most recent:  Considering your total clinical experience with this particular patient population, how severe are the patient's symptoms at this time?: 4 (19 1237)      Identifying Information:  Patient is a 23 year old year old, , single female.  Patient attended the session alone.        Referral:  Patient was referred for an assessment by self.   Reason for referral: clarify behavioral health diagnosis, determine behavioral health treatment options, assess client readiness and motivation to change and assess client social situation.     Patient met with a primary care physician in  of this year reported the following concerns.  Patient was encouraged to follow up with the [facility].     HPI   Anxiety Follow-Up    How are you doing with your anxiety since your last visit? Worsened a little    Are you having other symptoms that might be associated with anxiety? No    Have you had a significant life event? No          Are you feeling depressed? Yes    Do you have any concerns with your use of alcohol or other drugs? No     Feels mood and anxiety have worsened in the past year.  Has struggled with depression and anxiety for years.  Has been on fluoxetine in the past but had a hard time remembering to take medication regularly.  Has seen a therapist in the past and would like to try therapy again.  Prior therapist is no longer available.    Feels the increased anxiety/stress has also worsened her  "migraines.    Working at [employer] on [address]  Feels it has become monotonous/dull and would like to consider a job change.    Has travelled to [place] with friends and has another trip planned for October - she is looking forward to this    She walks a lot every day.    No suicidal ideation.  Feels safe.  Has hit her head out of frustration but not as attempt to cause serious personal injury.      Has a history of ADHD but hasn't had issues with attention/concentration lately.      Patient's Statement of Presenting Concern:  Patient reports the following reason(s) for seeking an assessment at this time: depressed mood.  Patient stated that her symptoms have resulted in the following functional impairments: organization, relationship(s), self-care, social interactions and work / vocational responsibilities    Patient is a 23-year-old female with a previous diagnosis of ADHD, depression and Asperger's.  Patient reports she had a formal evaluation at [facility] when she was high school.  A release information was signed and records have been requested.    Patient reports she graduated from [employer] in 2016 but has been continue to work at [employer] for the past 5 years.  Patient expressed fear of changing routines and familiarity.  Patient reports it took a long time to become comfortablee with her coworkers and is fearful to make the changes.  However, patient continues to report negative thoughts about herself and her current life situation.  Patient reports she continues to feel lonely and \"not good enough\".  Patient completed the PHQ 9 with overall score of 8 and indicated passive suicidal thoughts.  Patient reports this response reflects how bad she feels after she has a \"blowup\" with her mother.  Patient reports she lives with her mother and at times he also her which makes her feel \"like a horrible person and I should not be alive\".  Patient recognize she has a hard time reading social cues and recalls in " high school always having to ask people what they meant.  Patient reports she still does the same thing and at times her coworkers becomes upset with her with her.  Patient feels she is along better with older adults.    Patient reports she reads about history which is her passion.  Patient reports her ideal job will be working at [employer] in [facility].  However, patient reports history that is too strong of her emotional connection she has to avoid.  For example, patient reports she cannot watch or read about the holocaust as becomes too emotionally overwhelmed.  Patient clarified the general population believes people with Asperger's do not have empathy but she feels that she is aware around that she is overly empathetic towards others.  Patient reports she is often worrying about situations and that something may go wrong.  Patient reports she is traveling next month to [place] but begins to ruminate about getting through the [facility] on time if there is a problem with the flight what the weather could be etc.  Patient reports she is constantly thinking different scenarios in her mind and coming up with solutions.      Patient reports she has OCD tendencies as well.  She reports her  room is organized and like certain routines.  Patient adds that she becomes distressed if she is ever late for appointment or for work.  During the session, patient continued look at her walks as time was important to her.  Patient adds she also thinks of scenarios in her mind and becomes upset in situations change from the way she perceives them to happen.    Patient reports she last met with a therapist when she was in high school.  Patient felt comfortable with the [facility] and appreciate the understanding of Asperger's.    History of Presenting Concern:  Patient reports that these problem(s) began worse since graduating from college.  Patient has attempted to resolve these concerns in the past through: counseling,  medication(s) from physician / PCP and psychological testing. Patient reports that other professional(s) are involved in providing support / services. Patient has no other current mental health providers.      Social History:  Patient reported she grew up in [place], [place]. They were the second born of 3 children.  Patient reported that her childhood was supportive.  Patient reports her parents are  and her father lives in [place].  Patient has minimal contact with her father.  Patient reports she lives at her mother's house.  Patient reports her cousin lives in the basement for the past 4 years as well.  Patient reports she gets along well with her cousin and has her primary support.  Patient reports she comes from a large extended family and has over 9 and 7 uncles.  Patient believes there is a history of depression in her family and recalls that her aunt suicided.    .  Patient reported a history of 0 committed relationships or marriages. Patient has been single for 25 years. Patient reported having 0 children. Patient identified few stable and meaningful social connections.     Patient lives with Her mother's home.  Patient is currently employed full time and reports they are able to function appropriately at work..  Work history patient is work that [employer] the past 5 years.  Patient graduated from [employer] 2 years ago.  Patient recognize she is only 1 at her current work that has a college degree.    Patient reported that she has not been involved with the legal system.  Patient's highest education level was college graduate. Patient did identify the following learning problems: attention, concentration and speech. There are no ethnic, cultural or Religion factors that may be relevant for therapy.  Patient did not serve in the .       Mental Health History:  Patient could not recall specific remembers but reports a history of depression and extended family members and  recalls that on suicided.. Patient has received the following mental health services in the past: counseling and medication(s) from physician / PCP. Patient is not currently receiving any mental health services.      Chemical Health History:  Patient reported no family history of chemical health issues. Patient has not received chemical dependency treatment in the past. Patient is not currently receiving any chemical dependency treatment. Patient reports no problems as a result of their drinking / drug use.      Cage-AID score is: 0 Based on Cage-Aid score and clinical interview there  are not indications of drug or alcohol abuse.      Discussed the general effects of drugs and alcohol on health and well-being.      Significant Losses / Trauma / Abuse / Neglect Issues:  There are no indications or report of: significant losses, trauma, abuse or neglect.    Issues of possible neglect are not present.      Medical History:   Patient Active Problem List   Diagnosis     Obsessive-compulsive disorder     Attention deficit disorder     Eczema     Common migraine     Generalized anxiety disorder     Mild recurrent major depression (H)       Medication Review:  No current outpatient medications on file.     No current facility-administered medications for this visit.        Patient was provided recommendation to follow-up with physician.    Mental Status Assessment:  Appearance:   Appropriate   Eye Contact:   Fair   Psychomotor Behavior: Restless   Attitude:   Cooperative   Orientation:   All  Speech   Rate / Production: Monotone    Volume:  Soft   Mood:    Anxious   Affect:    Flat   Thought Content:  Clear   Thought Form:  Coherent  Logical   Insight:    Poor       Safety Assessment:    Patient has had a history of suicidal ideation: See above and [place] and self-injurious behavior: See [place]  Patient reports the following current or recent suicidal ideation or behaviors: See above.  Patient denies current or recent  homicidal ideation or behaviors.  Patient denies current or recent self injurious behavior or ideation.  Patient denies other safety concerns.  Patient reports there are no firearms in the house  Protective Factors Sense of responsibility to family, Life Satisfaction, Positive problem-solving skills and Positive social support   Risk Factors Family history of suicide, Implulsivity and Intense emotionality      [place] Suicide Severity Rating Scale (Lifetime/Recent)  [place] Suicide Severity Rating (Lifetime/Recent) 9/20/2019   1. Wish to be Dead (Lifetime) Yes   1. Wish to be Dead (Past Month) No   Actual Attempt (Lifetime) No   Actual Attempt (Past 3 Months) No   Has subject engaged in non-suicidal self-injurious behavior? (Lifetime) Yes   Has subject engaged in non-suicidal self-injurious behavior? (Past 3 Months) No   Interrupted Attempts (Lifetime) No   Interrupted Attempts (Past 3 Months) No   Aborted or Self-Interrupted Attempt (Lifetime) No   Aborted or Self-Interrupted Attempt (Past 3 Months) No   Preparatory Acts or Behavior (Lifetime) No   Preparatory Acts or Behavior (Past 3 Months) No   Most Recent Attempt Actual Lethality Code NA   Most Lethal Attempt Actual Lethality Code NA   Initial/First Attempt Actual Lethality Code NA       Plan for Safety and Risk Management:  A safety and risk management plan has not been developed at this time, however patient was encouraged to call [place] [phone] should there be a change in any of these risk factors.      Review of Symptoms:  Depression: Sleep Guilt Concentration Psychomotor slowing or agitation Worthless Ruminations  Rosaura:  No symptoms  Psychosis: No symptoms  Anxiety: Worries Nervousness  Panic:  No symptoms  Post Traumatic Stress Disorder: No symptoms  Obsessive Compulsive Disorder: Symmetry Rituals\routines  Eating Disorder: No symptoms  Oppositional Defiant Disorder: No symptoms  ADD / ADHD: Attention Organization Interrupts Intrudes  Conduct  Disorder: No symptoms    Patient's Strengths and Limitations:  Patient identified the following strengths or resources that will help him succeed in counseling: exercise routine, insight, intelligence and strong social skills. Patient identified the following supports: family. Things that may interfere with the patien'ts success in behavioral health services include:few friends and lack of social support.    Diagnostic Criteria:  CRITERIA (A-C) REPRESENT A MAJOR DEPRESSIVE EPISODE - SELECT THESE CRITERIA   - Depressed mood. Note: In children and adolescents, can be irritable mood.     - Diminished interest or pleasure in all, or almost all, activities.    - Increased sleep.    - Fatigue or loss of energy.    - Feelings of worthlessness or inappropriate guilt.    - Diminished ability to think or concentrate, or indecisiveness.       Functional Status:  Patient's symptoms have caused reduced functional status in the following areas: Occupational / Vocational -    Social / Relational -        DSM5 Diagnoses: (Sustained by DSM5 Criteria Listed Above)  Diagnoses: 296.32 (F33.1) Major Depressive Disorder, Recurrent Episode, Moderate _ and With anxious distress   History of attention deficit primarily inattentive type, history of diagnosis of Asperger's.  Wrist information has been signed to obtain records from [place].  Psychosocial & Contextual Factors: Work stress, stage of life, lack of social support system,  WHODAS Score: Patient did not complete.  See Media section of EPIC medical record for completed WHODAS    Preliminary Treatment Plan:    The client reports no currently identified Spiritism, ethnic or cultural issues relevant to therapy.    Initial Treatment will focus on: Depressed Mood -  .    Chemical dependency recommendations: No indications of CD issues    As a preliminary treatment goal, patient will experience a reduction in depressed mood, will develop more effective coping skills to manage depressive  symptoms, will develop healthy cognitive patterns and beliefs and will increase ability to function adaptively.    The focus of initial interventions will be to alleviate depressed mood, teach emotional regulation and teach relaxation strategies.    The patient is receiving treatment / structured support from the following professional(s) / service and treatment. Collaboration will be initiated with: primary care physician and Patient will be enrolled in behavioral health home.    Referral to another professional/service is not indicated at this time..    A Release of Information has been obtained for the following: Reason information was completed to obtain records from [place]..    Report to child or adult protection services was NA.    Dav Davis, [facility], Behavioral Health Clinician

## 2019-09-23 ENCOUNTER — DOCUMENTATION ONLY (OUTPATIENT)
Dept: BEHAVIORAL HEALTH | Facility: CLINIC | Age: 24
End: 2019-09-23

## 2019-09-23 NOTE — PROGRESS NOTES
Behavioral Health Home Services  [facility]: [place]    Social Work Care Navigator Note    Patient: Lynn Vaca  Date: September 23, 2019  Preferred Name: Lynn    Previous PHQ-9:   PHQ-9 SCORE 10/24/2013 8/14/2017 6/19/2019   PHQ-9 Total Score 6 - -   PHQ-9 Total Score MyChart - - 8 (Mild depression)   PHQ-9 Total Score - 9 8     Previous PALAK-7:   PALAK-7 SCORE 7/23/2015 8/14/2017 6/19/2019   Total Score 9 - -   Total Score - - 12 (moderate anxiety)   Total Score - 12 12     RADHA LEVEL:  No flowsheet data found.    Preferred Contact:  No data recorded    Type of Contact Today: DOCUMENTATION ONLY    Data: (subjective / Objective):  Per Behavioral Health Clinician diagnostic assessment completed 9/20/2019.  Social Work Care Coordinator will call pt to schedule a Health & Wellness Assessment to identify goals and resources for pt.    Odalys Frazier TERESA  [facility] Care Coordinator-  [facility]  Tele: [phone]          Next 5 appointments (look out 90 days)    Sep 25, 2019  9:30 AM CDT  Return Visit with RENITA Montez  [facility] ([facility] [address] [place] [id number] [phone]

## 2019-09-27 ENCOUNTER — OFFICE VISIT (OUTPATIENT)
Dept: BEHAVIORAL HEALTH | Facility: CLINIC | Age: 24
End: 2019-09-27
Payer: COMMERCIAL

## 2019-09-27 DIAGNOSIS — F84.5 ASPERGER'S DISORDER: Primary | ICD-10-CM

## 2019-09-27 PROCEDURE — 90834 PSYTX W PT 45 MINUTES: CPT | Performed by: SOCIAL WORKER
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
## 2019-09-27 NOTE — LETTER
[facility]  [address] [place] [phone]    2019    Re: Lynn Vaca  [address]  [facility] [phone] (home)       : 1995      To Whom It May Concern:      Lynn Vacais a client/patient who receives services from [facility] and has been my patient since 2019. I have knowledge of Ms. Vaca s disability.     I have reviewed the information provided to me in the Application for Eligibility for Connect 700 Program  and I certify that Ms. Taet disabilities are of such severe nature that they are unable to demonstrate their skills and abilities in the standard competitive selection process and there is no reasonable accommodation that  would enable them to demonstrate their skills and abilities in the standard competitive selection process.     This letter certifies that it is my opinion that Ms. Vaca is eligible to participate in the Connect 700 Program.      Sincerely,        MAGEN Duarte, [facility]  Behavioral Health Consultant  Licensed Psychotherapist.  [facility]  (p) [phone]  (f)  [phone]

## 2019-10-03 ENCOUNTER — TELEPHONE (OUTPATIENT)
Dept: BEHAVIORAL HEALTH | Facility: CLINIC | Age: 24
End: 2019-10-03

## 2019-10-03 NOTE — TELEPHONE ENCOUNTER
Behavioral Health Home Services  [facility]: [place]    Social Work Care Navigator Note    Patient: Lynn Vaca  Date: October 3, 2019  Preferred Name: Lynn    Previous PHQ-9:   PHQ-9 SCORE 10/24/2013 8/14/2017 6/19/2019   PHQ-9 Total Score 6 - -   PHQ-9 Total Score MyChart - - 8 (Mild depression)   PHQ-9 Total Score - 9 8     Previous PALAK-7:   PALAK-7 SCORE 7/23/2015 8/14/2017 6/19/2019   Total Score 9 - -   Total Score - - 12 (moderate anxiety)   Total Score - 12 12     RADHA LEVEL:  No flowsheet data found.    Preferred Contact:  Need for : No  Preferred Contact: Cell    Type of Contact Today: Phone call (patient / identified key support person reached)      Data: (subjective / Objective):  Recent ED/IP Admission or Discharge?   None    Patient Goals:  No data recorded      [facility] Core Service Provided:  Health and Wellness Promotion    Current Stressors / Issues / Care Plan Objective Addressed Today:  Would like a job where she can use her degree.    Intervention:  Motivational Interviewing: Supported Autonomy, Collaboration, Evocation   Target Behavior(s): defer    Assessment: (Progress on Goals / Homework):  Pt is still on her trip out east but answered her cell phone. She is off next Tuesday and said it would be ok if I called back then.    Plan: (Homework, other):  Patient was encouraged to continue to seek condition-related information and education.      Scheduled a Phone follow up appointment with HOLLAND CHAU in 1 week     Patient has set self-identified goals and will monitor progress until the next appointment       TOMAS Alejandra, Social Work Care Coordinator

## 2019-10-09 ENCOUNTER — TELEPHONE (OUTPATIENT)
Dept: BEHAVIORAL HEALTH | Facility: CLINIC | Age: 24
End: 2019-10-09

## 2019-10-09 NOTE — TELEPHONE ENCOUNTER
Behavioral Health Home Services  [facility]: [place]    Social Work Care Navigator Note    Patient: Lynn Vaca  Date: October 9, 2019  Preferred Name: Lynn    Previous PHQ-9:   PHQ-9 SCORE 10/24/2013 8/14/2017 6/19/2019   PHQ-9 Total Score 6 - -   PHQ-9 Total Score MyChart - - 8 (Mild depression)   PHQ-9 Total Score - 9 8     Previous PALAK-7:   PALAK-7 SCORE 7/23/2015 8/14/2017 6/19/2019   Total Score 9 - -   Total Score - - 12 (moderate anxiety)   Total Score - 12 12     RADHA LEVEL:  No flowsheet data found.    Preferred Contact:  Need for : No  Preferred Contact: Cell    Type of Contact Today: Phone call (patient / identified key support person reached)      Data: (subjective / Objective):  Recent ED/IP Admission or Discharge?   None    Patient Goals:  No data recorded    [facility] Core Service Provided:  Health and Wellness Promotion    Current Stressors / Issues / Care Plan Objective Addressed Today:  Out reach to schedule Health & Wellness Assessment.    Plan: (Homework, other):  Patient was encouraged to continue to seek condition-related information and education.      Scheduled a Clinic follow up appointment with HOLLAND CHAU in 1 week     Patient has set self-identified goals and will monitor progress until the next appointment on: See below    TOMAS Alejandra, Social Work Care Coordinator               Next 5 appointments (look out 90 days)    Oct 10, 2019 10:00 AM CDT  Return Visit with TOMAS Alejandra  [facility] [address] [place] [id number] [phone]

## 2019-10-10 ENCOUNTER — OFFICE VISIT (OUTPATIENT)
Dept: BEHAVIORAL HEALTH | Facility: CLINIC | Age: 24
End: 2019-10-10
Payer: COMMERCIAL

## 2019-10-10 DIAGNOSIS — R69 DIAGNOSIS DEFERRED: Primary | ICD-10-CM
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
## 2019-10-10 NOTE — Clinical Note
"FYI: In my note I added a section \"additional Psychosocial information\" that may be of interest to you.Thank youOdalys K"

## 2019-10-10 NOTE — LETTER
[facility] Health [place] ([facility]): Health Action Plan  [facility] Clinic: [place]    [facility]    Name: Lynn Vaca  Preferred Name: Lynn  : 1995  MRN: [id number]    How to Contact me  Need for : No  Preferred Contact: Cell    Home Phone [phone]   Mobile [phone]     My Goals  Goal Areas: Mental Health;Social and Interpersonal Relationships;Employment / Volunteer    Patient goals: Increase social skills; have more friendships; and find a more fulfilling job using her College degree where I majored in History and minored in Catholic; I would like to overcome my fear of parallel parking.    Strengths related to each goal: Pt describes herself as Perceptive; her boss describes her as dependable    Services and Supports Needed: [facility] team support through transition into a more fulfilling job and increased opportunities for socialization. On-going counseling with [facility].    Activities / Actions of Team to support goal(s): Monthly outreaches; scheduled counseling appts with [facility].    Activities / Actions of Patient / Parent / Guardian to support goal(s): Attend appointments or cancel/reschedule as needed. Utilize C700 application certificate provided by Behavioral Health Clinician for job search in her field.    Recommended Referral  Tobacco cessation referrals made?: NA  Mental Health / Chemical Dependency Referrals: Yes  Substance Use Referrals: Not Applicable  Mental Health Referrals: MH Services: [facility], [facility], Community  Provider  Community Health Referrals: Other (see comments) ([facility] CH[facility] as needed)  Dentist    My Team Members and Their Contact Information  Patient Care Team       Relationship Specialty Notifications Start End    Zulema Valdez MD PCP - General Family Practice  9/10/13     Phone: [phone] Fax: [phone] [address] [facility] [address]    Zulema Valdez MD Assigned PCP   18     Phone: [phone] Fax: [phone] [address]  [facility] [address]      Sukhdev Davis [facility] Behavioral Health Clinician - call   Odalys Karin [facility] Social Work Care Coordinator - [phone]  Sherri Washington Community Health Worker [phone]    My Wellness Plan  Recommendations / Plan for safety concerns:   Call family. Call clinic, call 911 as needed.          Lynn Vaca co-developed the Health Action Plan with the [facility] Team and received a copy of this document.  Date Health Action Plan Completed/Updated: 11/07/19

## 2019-10-25 ENCOUNTER — TELEPHONE (OUTPATIENT)
Dept: BEHAVIORAL HEALTH | Facility: CLINIC | Age: 24
End: 2019-10-25

## 2019-10-25 NOTE — TELEPHONE ENCOUNTER
Behavioral Health Home Services  [facility] Clinic: [place]    Social Work Care Navigator Note    Patient: Lynn Vaca  Date: October 25, 2019  Preferred Name: Lynn    Previous PHQ-9:   PHQ-9 SCORE 10/24/2013 8/14/2017 6/19/2019   PHQ-9 Total Score 6 - -   PHQ-9 Total Score MyChart - - 8 (Mild depression)   PHQ-9 Total Score - 9 8     Previous PALAK-7:   PALAK-7 SCORE 7/23/2015 8/14/2017 6/19/2019   Total Score 9 - -   Total Score - - 12 (moderate anxiety)   Total Score - 12 12     RADHA LEVEL:  No flowsheet data found.    Preferred Contact:  Need for : No  Preferred Contact: Cell    Type of Contact Today: Phone call (patient / identified key support person reached)      Data: (subjective / Objective):  Recent ED/IP Admission or Discharge?   None    Patient Goals:  Goal Areas: Mental Health;Social and Interpersonal Relationships;Employment / Volunteer  Patient stated goals: Increase social skills; have more friendships; and find a more fulfilling job using her College degree where I majored in History and minored in Congregational; I would like to overcome my fear of parallel parking.    [facility] Core Service Provided:  Health and Wellness Promotion    Current Stressors / Issues / Care Plan Objective Addressed Today:  Looking for work in her field of study    Intervention:  Motivational Interviewing: Expressed Empathy/Understanding, Supported Autonomy, Collaboration, Evocation and Permission to raise concern or advise   Target Behavior(s): Self confidence    Assessment: (Progress on Goals / Homework):  Lynn is going to watch for her next work schedule to be posted and call Social Work Care Coordinator back by 10/31/2019 to schedule a 90 minute appt where we can look at [employer] job postings and compare content to her resume.    Plan: (Homework, other):  Patient was encouraged to continue to seek condition-related information and education.      Scheduled a Clinic follow up appointment with HOLLAND CHAU in 2 weeks     Patient  has set self-identified goals and will monitor progress until the next appointment       TOMAS Alejandra, Social Work Care Coordinator

## 2019-11-05 ENCOUNTER — TELEPHONE (OUTPATIENT)
Dept: BEHAVIORAL HEALTH | Facility: CLINIC | Age: 24
End: 2019-11-05

## 2019-11-05 NOTE — TELEPHONE ENCOUNTER
Behavioral Health Home Services  [facility] Clinic: [place]    Social Work Care Navigator Note    Patient: Lynn Vaca  Date: November 5, 2019  Preferred Name: Lynn    Previous PHQ-9:   PHQ-9 SCORE 10/24/2013 8/14/2017 6/19/2019   PHQ-9 Total Score 6 - -   PHQ-9 Total Score MyChart - - 8 (Mild depression)   PHQ-9 Total Score - 9 8     Previous PALAK-7:   PALAK-7 SCORE 7/23/2015 8/14/2017 6/19/2019   Total Score 9 - -   Total Score - - 12 (moderate anxiety)   Total Score - 12 12     RADHA LEVEL:  No flowsheet data found.    Preferred Contact:  Need for : No  Preferred Contact: Cell    Type of Contact Today: Phone call (patient / identified key support person reached)      Data: (subjective / Objective):  Recent ED/IP Admission or Discharge?   None    Patient Goals:  Goal Areas: Mental Health;Social and Interpersonal Relationships;Employment / Volunteer  Patient stated goals: Increase social skills; have more friendships; and find a more fulfilling job using her College degree where I majored in History and minored in Moravian; I would like to overcome my fear of parallel parking.    [facility] Core Service Provided:  Health and Wellness Promotion    Current Stressors / Issues / Care Plan Objective Addressed Today:  Pt would like to find employment where she can utilize her college degree.    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    Plan: (Homework, other):  Patient was encouraged to continue to seek condition-related information and education.      Scheduled a Clinic follow up appointment with HOLLAND CHAU in 1 week     Patient has set self-identified goals and will monitor progress until the next appointment     TOMAS Alejandra, Social  Work Care Coordinator

## 2019-11-07 ENCOUNTER — DOCUMENTATION ONLY (OUTPATIENT)
Dept: BEHAVIORAL HEALTH | Facility: CLINIC | Age: 24
End: 2019-11-07

## 2019-11-07 NOTE — PROGRESS NOTES
Behavioral Health Home Services  [facility] Clinic: [place]    Social Work Care Navigator Note    Patient: Lynn Vaca  Date: November 7, 2019  Preferred Name: Lynn    Previous PHQ-9:   PHQ-9 SCORE 10/24/2013 8/14/2017 6/19/2019   PHQ-9 Total Score 6 - -   PHQ-9 Total Score MyChart - - 8 (Mild depression)   PHQ-9 Total Score - 9 8     Previous PALAK-7:   PALAK-7 SCORE 7/23/2015 8/14/2017 6/19/2019   Total Score 9 - -   Total Score - - 12 (moderate anxiety)   Total Score - 12 12     RADHA LEVEL:  No flowsheet data found.    Preferred Contact:  Need for : No  Preferred Contact: Cell    Type of Contact Today: DOCUMENTATION ONLY    Data: (subjective / Objective):  [facility] Rounds today. Behavioral Health Clinician approved Health Action Plan. Social Work Care Coordinator printed and mailed to pt.    Social Work Care Coordinator awaiting call back from pt regarding when she would like to meet to job search.    Odalys MARIN  [facility] Care Coordinator-  [facility]  Tele: [phone]

## 2019-11-15 ENCOUNTER — TELEPHONE (OUTPATIENT)
Dept: BEHAVIORAL HEALTH | Facility: CLINIC | Age: 24
End: 2019-11-15

## 2019-11-15 NOTE — TELEPHONE ENCOUNTER
Behavioral Health Home Services  [facility] Clinic: [place]    Social Work Care Navigator Note    Patient: Lynn Vaca  Date: November 15, 2019  Preferred Name: Lynn    Previous PHQ-9:   PHQ-9 SCORE 10/24/2013 8/14/2017 6/19/2019   PHQ-9 Total Score 6 - -   PHQ-9 Total Score MyChart - - 8 (Mild depression)   PHQ-9 Total Score - 9 8     Previous PALAK-7:   PALAK-7 SCORE 7/23/2015 8/14/2017 6/19/2019   Total Score 9 - -   Total Score - - 12 (moderate anxiety)   Total Score - 12 12     RADHA LEVEL:  No flowsheet data found.    Preferred Contact:  Need for : No  Preferred Contact: Cell    Type of Contact Today: Phone call (not reached/unavailable)      Data: (subjective / Objective):  Attempted to reach patient, but was unsuccessful.  Plan to attempt again.  TOMAS Alejandra [facility]

## 2019-11-21 ENCOUNTER — TELEPHONE (OUTPATIENT)
Dept: BEHAVIORAL HEALTH | Facility: CLINIC | Age: 24
End: 2019-11-21

## 2019-11-21 NOTE — TELEPHONE ENCOUNTER
Behavioral Health Home Services  [facility] Clinic: [place]    Social Work Care Navigator Note    Patient: Lynn Vaca  Date: November 21, 2019  Preferred Name: Lynn    Previous PHQ-9:   PHQ-9 SCORE 10/24/2013 8/14/2017 6/19/2019   PHQ-9 Total Score 6 - -   PHQ-9 Total Score MyChart - - 8 (Mild depression)   PHQ-9 Total Score - 9 8     Previous PALAK-7:   PALAK-7 SCORE 7/23/2015 8/14/2017 6/19/2019   Total Score 9 - -   Total Score - - 12 (moderate anxiety)   Total Score - 12 12     RADHA LEVEL:  No flowsheet data found.    Preferred Contact:  Need for : No  Preferred Contact: Cell    Type of Contact Today: Phone call (patient / identified key support person reached)      Data: (subjective / Objective):  Recent ED/IP Admission or Discharge?   None    Patient Goals:  Goal Areas: Mental Health;Social and Interpersonal Relationships;Employment / Volunteer  Patient stated goals: Increase social skills; have more friendships; and find a more fulfilling job using her College degree where I majored in History and minored in Episcopal; I would like to overcome my fear of parallel parking.    [facility] Core Service Provided:  Health and Wellness Promotion    Current Stressors / Issues / Care Plan Objective Addressed Today:  Lynn is hanging in there at her job; unfortunately our meeting to look at her resume and state jobs for her will have to wait until after Thanksgiving.    Intervention:  Motivational Interviewing: Supported Autonomy, Collaboration, Evocation   Target Behavior(s): Low self esteem    Assessment: (Progress on Goals / Homework):  Lynn appears to be interested in pursuing a job that utilizes her college degree. For now she is happy at her current job because she knows her boss believes in her.    Plan: (Homework, other):  Patient was encouraged to continue to seek condition-related information and education.      Scheduled a Clinic follow up appointment with HOLLAND CHAU in 3 weeks     Patient has set  self-identified goals and will monitor progress until the next appointment: Lynn will call in to get this scheduled.     TOMAS Alejandra, Social Work Care Coordinator

## 2019-12-02 ENCOUNTER — TELEPHONE (OUTPATIENT)
Dept: BEHAVIORAL HEALTH | Facility: CLINIC | Age: 24
End: 2019-12-02

## 2019-12-02 NOTE — TELEPHONE ENCOUNTER
Behavioral Health Home Services  [facility] Clinic: [place]    Social Work Care Navigator Note    Patient: Lynn Vaca  Date: December 2, 2019  Preferred Name: Lynn    Previous PHQ-9:   PHQ-9 SCORE 10/24/2013 8/14/2017 6/19/2019   PHQ-9 Total Score 6 - -   PHQ-9 Total Score MyChart - - 8 (Mild depression)   PHQ-9 Total Score - 9 8     Previous PALAK-7:   PALAK-7 SCORE 7/23/2015 8/14/2017 6/19/2019   Total Score 9 - -   Total Score - - 12 (moderate anxiety)   Total Score - 12 12     RADHA LEVEL:  No flowsheet data found.    Preferred Contact:  Need for : No  Preferred Contact: Cell    Type of Contact Today: Phone call (not reached/unavailable)    Data: (subjective / Objective):   Attempted to reach patient, but was unsuccessful.  Pt usually returns Social Work Care Coordinator outreach calls so will allow her time to do so before I call again.     TOMAS Alejandra Social Work Care Coordinator

## 2019-12-06 ENCOUNTER — TELEPHONE (OUTPATIENT)
Dept: BEHAVIORAL HEALTH | Facility: CLINIC | Age: 24
End: 2019-12-06

## 2019-12-06 NOTE — TELEPHONE ENCOUNTER
Behavioral Health Home Services  [facility] Clinic: [place]  Social Work Care Navigator Note  Patient: Lynn Vaca  Date: December 6, 2019  Preferred Name: Lynn  Previous PHQ-9:   PHQ-9 SCORE 10/24/2013 8/14/2017 6/19/2019   PHQ-9 Total Score 6 - -   PHQ-9 Total Score MyChart - - 8 (Mild depression)   PHQ-9 Total Score - 9 8   Previous PALAK-7:   PALAK-7 SCORE 7/23/2015 8/14/2017 6/19/2019   Total Score 9 - -   Total Score - - 12 (moderate anxiety)   Total Score - 12 12   RADHA LEVEL:  No flowsheet data found.    Preferred Contact:  Need for : No  Preferred Contact: Cell    Type of Contact Today: Phone call (patient / identified key support person reached)      Data: (subjective / Objective):  Recent ED/IP Admission or Discharge?   None    Patient Goals:  Goal Areas: Mental Health;Social and Interpersonal Relationships;Employment / Volunteer  Patient stated goals: Increase social skills; have more friendships; and find a more fulfilling job using her College degree where I majored in History and minored in Zoroastrianism; I would like to overcome my fear of parallel parking.    [facility] Core Service Provided:  Health and Wellness Promotion    Current Stressors / Issues / Care Plan Objective Addressed Today:  None today. Feels ready to start the job search process.    Intervention:  Motivational Interviewing: Expressed Empathy/Understanding   Target Behavior(s): Anxiety    Assessment: (Progress on Goals / Homework):  Pt is proactive in engaging [facility] team support for job search where she can utilize her college degree. Lynn called in to schedule a 1:1 in clinic with Social Work Care Coordinator.    Plan: (Homework, other):  Patient was encouraged to continue to seek condition-related information and education.      Scheduled a Clinic follow up appointment with HOLLAND CHAU in 1 week     Patient has set self-identified goals and will monitor progress until the next appointment see below.     TOMAS Alejandra, Social Work  Care Coordinator

## 2019-12-10 ENCOUNTER — OFFICE VISIT (OUTPATIENT)
Dept: BEHAVIORAL HEALTH | Facility: CLINIC | Age: 24
End: 2019-12-10
Payer: COMMERCIAL

## 2019-12-10 DIAGNOSIS — R69 DIAGNOSIS DEFERRED: Primary | ICD-10-CM

## 2019-12-10 NOTE — PROGRESS NOTES
Behavioral Health Home Services  [facility] Clinic: [place]    Social Work Care Navigator Note    Patient: Lynn Vaca  Date: December 10, 2019  Preferred Name: Lynn    Previous PHQ-9:   PHQ-9 SCORE 10/24/2013 8/14/2017 6/19/2019   PHQ-9 Total Score 6 - -   PHQ-9 Total Score MyChart - - 8 (Mild depression)   PHQ-9 Total Score - 9 8     Previous PALAK-7:   PALAK-7 SCORE 7/23/2015 8/14/2017 6/19/2019   Total Score 9 - -   Total Score - - 12 (moderate anxiety)   Total Score - 12 12     RADHA LEVEL:  No flowsheet data found.    Preferred Contact:  Need for : No  Preferred Contact: Cell    Type of Contact Today: Face to Face in Clinic      Data: (subjective / Objective):  Recent ED/IP Admission or Discharge?   None    Patient Goals:  Goal Areas: Mental Health;Social and Interpersonal Relationships;Employment / Volunteer  Patient stated goals: Increase social skills; have more friendships; and find a more fulfilling job using her College degree where I majored in History and minored in Alevism; I would like to overcome my fear of parallel parking.    [facility] Core Service Provided:  Health and Wellness Promotion    Current Stressors / Issues / Care Plan Objective Addressed Today:  Lynn has the C-700 filled out by our Behavioral Health Clinician but she has not submitted it to the State [facility] yet. Lynn will go home and look for the certificate/form Behavioral Health Clinician provided. Today, Lynn and I went over the 4 steps to take to obtain job priority for Library positions. Lynn will also Google the [employer] web site to become familiar with the program.     Intervention:  Motivational Interviewing: Expressed Empathy/Understanding and Supported Autonomy, Collaboration, Evocation   Target Behavior(s): SElf Esteem    Assessment: (Progress on Goals / Homework):  Lynn is excited about the prospect of finding a job in her field.    Plan: (Homework, other):  Patient was encouraged to continue to seek  condition-related information and education.      Scheduled a Phone follow up appointment with HOLLAND CHAU in 1 week     Patient has set self-identified goals and will monitor progress until the next appointment      TOMAS Alejandra, Social Work Care Coordinator

## 2020-01-17 ENCOUNTER — TELEPHONE (OUTPATIENT)
Dept: BEHAVIORAL HEALTH | Facility: CLINIC | Age: 25
End: 2020-01-17

## 2020-01-17 NOTE — TELEPHONE ENCOUNTER
Behavioral Health Home Services  [facility] Clinic: [place]    Social Work Care Navigator Note    Patient: Lynn Vaca  Date: January 17, 2020  Preferred Name: Lynn    Previous PHQ-9:   PHQ-9 SCORE 10/24/2013 8/14/2017 6/19/2019   PHQ-9 Total Score 6 - -   PHQ-9 Total Score MyChart - - 8 (Mild depression)   PHQ-9 Total Score - 9 8     Previous PALAK-7:   PALAK-7 SCORE 7/23/2015 8/14/2017 6/19/2019   Total Score 9 - -   Total Score - - 12 (moderate anxiety)   Total Score - 12 12     RADHA LEVEL:  No flowsheet data found.    Preferred Contact:  Need for : No  Preferred Contact: Cell    Type of Contact Today: Phone call (not reached/unavailable)      Data: (subjective / Objective): LM inviting pt to call and schedule a face to face or phone consultation.    Attempted to reach patient, but was unsuccessful.  Plan to attempt again.  TOMAS Alejandra [facility]

## 2020-02-14 ENCOUNTER — DOCUMENTATION ONLY (OUTPATIENT)
Dept: BEHAVIORAL HEALTH | Facility: CLINIC | Age: 25
End: 2020-02-14

## 2020-02-14 NOTE — PROGRESS NOTES
"Behavioral Health Home Services  [facility] Clinic: [place]        Social Work Care Navigator Note      Patient: Lynn Vaca  Date: February 14, 2020  Preferred Name: Lynn    Previous PHQ-9:   PHQ-9 SCORE 10/24/2013 8/14/2017 6/19/2019   PHQ-9 Total Score 6 - -   PHQ-9 Total Score MyChart - - 8 (Mild depression)   PHQ-9 Total Score - 9 8     Previous PALAK-7:   PALAK-7 SCORE 7/23/2015 8/14/2017 6/19/2019   Total Score 9 - -   Total Score - - 12 (moderate anxiety)   Total Score - 12 12     RADHA LEVEL:  No flowsheet data found.    Preferred Contact:  Need for : No  Preferred Contact: Cell      Type of Contact Today: DOCUMENTATION ONLY      Data: (subjective / Objective):     Behavioral Health Clinician would like Care Plan updated to include \" SIB-hits head on wall when angry or frustrated\"     Risk factors for SIB were discussed with Behavioral Health Clinician. Patient reports currently feeling safe with no intent for self harm at the present time. If risk level changes, patient will contact [facility] Team or Community  Provider.    Patient may call the Mental Health Crisis Line (ph: [phone]) and/or local [facility] crisis team [phone] or 838. Monitor medication compliance. Schedule a face to face with [facility] team in clinic.    Odalys MARIN  [facility] Care Coordinator-  [facility]  Tele: [phone]          "

## 2020-02-14 NOTE — LETTER
Behavioral Health [place] ([facility]): Health Action Plan  [facility] Clinic: [place]    [facility]      Name: Lynn Vaca  Preferred Name: Lynn  : 1995  MRN: [id number]    How to Contact me  Need for : No  Preferred Contact: Cell    Home Phone [phone]   Mobile [phone]         My Goals  Goal Areas: Mental Health;Social and Interpersonal Relationships;Employment / Volunteer  Patient stated goals: Increase social skills; have more friendships; and find a more fulfilling job using her College degree where I majored in History and minored in Jew; I would like to overcome my fear of parallel parking.  Strengths related to each goal: Pt describes herself as Perceptive; her boss describes her as dependable  Services and Supports Needed: [facility] team support through transition into a more fulfilling job and increased opportunities for socialization. On-going counseling with [facility].  Activities / Actions of Team to support goal(s): Monthly outreaches; scheduled counseling appts with [facility].  Activities / Actions of Patient / Parent / Guardian to support goal(s): Attend appointments or cancel/reschedule as needed. Utilize C700 Application provided by [facility] to support job search efforts.        Recommended Referral  Tobacco cessation referrals made?: NA  Mental Health / Chemical Dependency Referrals: Yes  Substance Use Referrals: Not Applicable  Mental Health Referrals: MH Services: [facility], [facility], Community  Provider  Community Health Referrals: Other (see comments) ([facility] CHW as needed)  Dentist        My Team Members and Their Contact Information  Patient Care Team       Relationship Specialty Notifications Start End    Zulema Valdez MD PCP - General Family Practice  9/10/13     Phone: [phone] Fax: [phone] [address] [facility] [address]    Zulema Valdez MD Assigned PCP   18     Phone: [phone] Fax: [phone] [address] [facility] [address]     Dav Davis, [facility]   - Clinical Admissions 10/10/19     Phone: [phone] Fax: [phone]         [facility] [place] [address] [facility] [address]    Mayra Frazier LSW  Clinic Admissions 10/10/19     [facility]    Phone: [phone] Fax: [phone]         [address] [facility] [address]    [facility]  Dentist  10/10/19     Phone: [phone]               My Wellness Plan  Recommendations / Plan for safety concerns: H/O SIB: hits head on wall when frustrated or angry. Risk factors for SIB were discussed with Behavioral Health Clinician. Patient reports currently feeling safe with no intent or plan to harm self at the present time. If risk level changes, patient will contact [facility] Team or Community  Provider.    Patient may call the Mental Health Crisis Line (ph: [phone]) and/or [facility] crisis [facility] team [phone] or 449. Monitor medication compliance. Schedule a face to face with [facility] team in clinic.    Crisis Plan (emergencies / when urgent support needed): Call family, call clinic, call 911          Lynn Vaca co-developed the Health Action Plan with the [facility] Team and received a copy of this document.  Date Health Action Plan Completed/Updated: 2/14/2020

## 2020-02-17 ENCOUNTER — TELEPHONE (OUTPATIENT)
Dept: BEHAVIORAL HEALTH | Facility: CLINIC | Age: 25
End: 2020-02-17

## 2020-02-17 NOTE — TELEPHONE ENCOUNTER
Behavioral Health Home Services  [facility] Clinic: [place]        Social Work Care Navigator Note      Patient: Lynn Vaca  Date: February 17, 2020  Preferred Name: Lynn    Previous PHQ-9:   PHQ-9 SCORE 10/24/2013 8/14/2017 6/19/2019   PHQ-9 Total Score 6 - -   PHQ-9 Total Score MyChart - - 8 (Mild depression)   PHQ-9 Total Score - 9 8     Previous PALAK-7:   PALAK-7 SCORE 7/23/2015 8/14/2017 6/19/2019   Total Score 9 - -   Total Score - - 12 (moderate anxiety)   Total Score - 12 12     RADHA LEVEL:  No flowsheet data found.    Preferred Contact:  Need for : No  Preferred Contact: Cell      Type of Contact Today: Phone call (not reached/unavailable)      Data: (subjective / Objective):  Attempted to reach patient, but was unsuccessful.  Plan to attempt again.  TOMAS Alejandra [facility]

## 2020-02-21 ENCOUNTER — TELEPHONE (OUTPATIENT)
Dept: BEHAVIORAL HEALTH | Facility: CLINIC | Age: 25
End: 2020-02-21

## 2020-02-21 NOTE — TELEPHONE ENCOUNTER
Behavioral Health Home Services  [facility]: [place]        Social Work Care Navigator Note      Patient: Lynn Vaca  Date: February 21, 2020  Preferred Name: Lynn    Previous PHQ-9:   PHQ-9 SCORE 10/24/2013 8/14/2017 6/19/2019   PHQ-9 Total Score 6 - -   PHQ-9 Total Score MyChart - - 8 (Mild depression)   PHQ-9 Total Score - 9 8     Previous PALAK-7:   PALAK-7 SCORE 7/23/2015 8/14/2017 6/19/2019   Total Score 9 - -   Total Score - - 12 (moderate anxiety)   Total Score - 12 12     RADHA LEVEL:  No flowsheet data found.    Preferred Contact:  Need for : No  Preferred Contact: Cell        Type of Contact Today: Phone call (patient / identified key support person reached)      Data: (subjective / Objective):  Recent ED/IP Admission or Discharge?   None    Patient Goals:  Goal Areas: Mental Health;Social and Interpersonal Relationships;Employment / Volunteer  Patient stated goals: Increase social skills; have more friendships; and find a more fulfilling job using her College degree where I majored in History and minored in Synagogue; I would like to overcome my fear of parallel parking.        [facility] Core Service Provided:  Health and Wellness Promotion    Current Stressors / Issues / Care Plan Objective Addressed Today:  Needs help submitting her portion of the C700 in order to receive a certificate making her eligible for priority hiring.    Intervention:  Motivational Interviewing: Expressed Empathy/Understanding   Target Behavior(s): Aspbergers    Assessment: (Progress on Goals / Homework):  Lynn is able to commit to scheduling a face to face meeting in clinic with Social Work Care Coordinator to review [employer] program.    Plan: (Homework, other):  Patient was encouraged to continue to seek condition-related information and education.      Scheduled a Clinic follow up appointment with HOLLAND CHAU in 2 weeks     Patient has set self-identified goals and will monitor progress until the next appointment see  below       TOMAS Alejandra, Social Work Care Coordinator               Next 5 appointments (look out 90 days)    Mar 03, 2020 10:00 AM CST  [facility] Visit with TOMAS Alejandra  [facility] [address] [place] [id number] [phone]

## 2020-03-03 ENCOUNTER — OFFICE VISIT (OUTPATIENT)
Dept: BEHAVIORAL HEALTH | Facility: CLINIC | Age: 25
End: 2020-03-03
Payer: COMMERCIAL

## 2020-03-03 DIAGNOSIS — R69 DIAGNOSIS DEFERRED: Primary | ICD-10-CM

## 2020-03-03 NOTE — PROGRESS NOTES
Behavioral Health Home Services  [facility]: [place]    Social Work Care Navigator Note    Patient: Lynn Vaca  Date: March 3, 2020  Preferred Name: Lynn    Previous PHQ-9:   PHQ-9 SCORE 10/24/2013 8/14/2017 6/19/2019   PHQ-9 Total Score 6 - -   PHQ-9 Total Score MyChart - - 8 (Mild depression)   PHQ-9 Total Score - 9 8     Previous PALAK-7:   PALAK-7 SCORE 7/23/2015 8/14/2017 6/19/2019   Total Score 9 - -   Total Score - - 12 (moderate anxiety)   Total Score - 12 12     RADHA LEVEL:  RADHA Score (Last Two) 3/3/2020   RADHA Raw Score 30   Activation Score 56   RADHA Level 3     Preferred Contact:  Need for : No  Preferred Contact: Cell    Type of Contact Today: Face to Face in Clinic      Data: (subjective / Objective):  Recent ED/IP Admission or Discharge?   None    Patient Goals:  Goal Areas: Mental Health;Social and Interpersonal Relationships;Employment / Volunteer  Patient stated goals: Increase social skills; have more friendships; and find a more fulfilling job using her College degree where I majored in History and minored in Jew; I would like to overcome my fear of parallel parking.    [facility] Core Service Provided:  Health and Wellness Promotion  Individual and Family Support: aimed to help clients reduce barriers to achieving goals, increase health literacy and knowledge about chronic condition(s), increase self-efficacy skills, and improve health outcomes  Referral to Community and Social Support Services: Provided patient with referrals (see plan section)  Assisted in scheduling an appointment to a referral / service (see plan section)    Current Stressors / Issues / Care Plan Objective Addressed Today:  Lynn is still looking forward to obtaining employment where she can utilize her college degree.    Intervention:  Motivational Interviewing: Co-Developed Goal: Pt will accept assistance from CHW to update her resume, Expressed Empathy/Understanding, Supported Autonomy, Collaboration,  Evocation, Permission to raise concern or advise and Open-ended questions   Target Behavior(s):  Anxiety    Assessment: (Progress on Goals / Homework):  Social Work Care Coordinator observed pt was able to make considerably more eye contact today. We were able to complete [employer] application for eligibility form to fax along with Behavioral Health Clinician statement of need.to [place] State. Pt agrees to the next steps: waiting arrival of certification in the mail and a call from Community Health Worker.     Plan: (Homework, other):  Patient was encouraged to continue to seek condition-related information and education.      Scheduled a Phone follow up appointment with CHW in 2 weeks     Patient has set self-identified goals and will monitor progress until the next appointment To be determined      TOMAS Alejandra, Social Work Care Coordinator

## 2020-03-17 ENCOUNTER — TELEPHONE (OUTPATIENT)
Dept: BEHAVIORAL HEALTH | Facility: CLINIC | Age: 25
End: 2020-03-17

## 2020-03-17 NOTE — TELEPHONE ENCOUNTER
Behavioral Health Home Services  [facility]: [place]    Social Work Care Navigator Note    Patient: Lynn Vaca  Date: March 17, 2020  Preferred Name: Lynn    Previous PHQ-9:   PHQ-9 SCORE 10/24/2013 8/14/2017 6/19/2019   PHQ-9 Total Score 6 - -   PHQ-9 Total Score MyChart - - 8 (Mild depression)   PHQ-9 Total Score - 9 8     Previous PALAK-7:   PALAK-7 SCORE 7/23/2015 8/14/2017 6/19/2019   Total Score 9 - -   Total Score - - 12 (moderate anxiety)   Total Score - 12 12     RADHA LEVEL:  RADHA Score (Last Two) 3/3/2020   RADHA Raw Score 30   Activation Score 56   RADHA Level 3     Preferred Contact:  Need for : No  Preferred Contact: Cell    Type of Contact Today: Phone call (patient / identified key support person reached)    Data: (subjective / Objective):  Recent ED/IP Admission or Discharge?   None    Patient Goals:  Goal Areas: Mental Health;Social and Interpersonal Relationships;Employment / Volunteer  Patient stated goals: Increase social skills; have more friendships; and find a more fulfilling job using her College degree where I majored in History and minored in Anabaptist; I would like to overcome my fear of parallel parking.    [facility] Core Service Provided:  Health and Wellness Promotion    Current Stressors / Issues / Care Plan Objective Addressed Today:  Lynn works at a gas station. She is worried about getting exposed to COVID19. Lynn washes her hands often. Lynn has the certificate from the [employer] program and can now update her resume and begin to look for other employment; most likely when the COVID 19 crisis clears up.    Intervention:  Motivational Interviewing: Expressed Empathy/Understanding, Supported Autonomy, Collaboration, Evocation, Permission to raise concern or advise and Open-ended questions   Target Behavior(s): Aspbergers    Assessment: (Progress on Goals / Homework):  Lynn sounded very glad to hear from Social Work Care Coordinator to share she received the Certificate to  search for [id number] program jobs. Community Health Worker will assist Lynn with updating her resume when COVID 19 clears up. Social Work Care Coordinator will look at potential job openings on the web site.    Plan: (Homework, other):  Patient was encouraged to continue to seek condition-related information and education.      Scheduled a Phone follow up appointment with HOLLAND CHAU in 1 week     Patient has set self-identified goals and will monitor progress until the next appointment      TOMAS Alejandra, Social Work Care Coordinator

## 2020-04-08 ENCOUNTER — TELEPHONE (OUTPATIENT)
Dept: BEHAVIORAL HEALTH | Facility: CLINIC | Age: 25
End: 2020-04-08

## 2020-04-08 NOTE — TELEPHONE ENCOUNTER
Behavioral Health Home Services  [facility]: [place]        Social Work Care Navigator Note      Patient: Lynn Vaca  Date: April 8, 2020  Preferred Name: Lynn    Previous PHQ-9:   PHQ-9 SCORE 10/24/2013 8/14/2017 6/19/2019   PHQ-9 Total Score 6 - -   PHQ-9 Total Score MyChart - - 8 (Mild depression)   PHQ-9 Total Score - 9 8     Previous PALAK-7:   PALAK-7 SCORE 7/23/2015 8/14/2017 6/19/2019   Total Score 9 - -   Total Score - - 12 (moderate anxiety)   Total Score - 12 12     RADHA LEVEL:  RADHA Score (Last Two) 3/3/2020   RADHA Raw Score 30   Activation Score 56   RADHA Level 3       Preferred Contact:  Need for : No  Preferred Contact: Cell      Type of Contact Today: Phone call (not reached/unavailable)      Data: (subjective / Objective): LM offering assistance to get her resume updated in April.    Attempted to reach patient, but was unsuccessful.  Plan: await call back from ptTOMAS [facility]

## 2020-04-15 ENCOUNTER — VIRTUAL VISIT (OUTPATIENT)
Dept: BEHAVIORAL HEALTH | Facility: CLINIC | Age: 25
End: 2020-04-15
Payer: COMMERCIAL

## 2020-04-15 DIAGNOSIS — R69 DIAGNOSIS DEFERRED: Primary | ICD-10-CM

## 2020-04-15 NOTE — PROGRESS NOTES
Behavioral Health Home Services  [facility]: [place]    Community Health Worker Note    Patient: Lynn Vaca  Date: April 15, 2020  Preferred Name: Lynn    Previous PHQ-9:   PHQ-9 SCORE 10/24/2013 8/14/2017 6/19/2019   PHQ-9 Total Score 6 - -   PHQ-9 Total Score MyChart - - 8 (Mild depression)   PHQ-9 Total Score - 9 8     Previous PALAK-7:   PALAK-7 SCORE 7/23/2015 8/14/2017 6/19/2019   Total Score 9 - -   Total Score - - 12 (moderate anxiety)   Total Score - 12 12     RADHA LEVEL:  RADHA Score (Last Two) 3/3/2020   RADHA Raw Score 30   Activation Score 56   RADHA Level 3       Preferred Contact:  Need for : No  Preferred Contact: Cell      Type of Contact Today: Phone call (patient / identified key support person reached)      Data: (Subjective / Objective):  Recent ED/IP Admission or Discharge?   None    Patient Goals:  Goal Areas: Mental Health;Social and Interpersonal Relationships;Employment / Volunteer  Patient stated goals: Increase social skills; have more friendships; and find a more fulfilling job using her College degree where I majored in History and minored in Mosque; I would like to overcome my fear of parallel parking.      [facility] Core Service Provided:  Health and Wellness Promotion    Current Reported Stressors / Issues / Health Action Plan Items Addressed Today:  Phone call today with Lynn to introduce myself. I did discuss with Lynn the referral and request for help with setting up a new resume for her. Lynn states that at this time that is not super high on her priority list. We did talk about the options of doing this over the phone, online with a video, or waiting until we are able to do this in person. Lynn would like to wait and do this in person when the Covid things are done.     Plan: (Homework, other):  Patient was encouraged to continue to seek condition-related information and education.      Scheduled a Phone follow up appointment with CHW in 2 weeks       Mary  Fel[facility] Community Health Worker

## 2020-05-06 NOTE — PROGRESS NOTES
Behavioral Health Home Services  [facility]: [place]rique    Social Work Care Navigator Note    Patient: Lynn Vaca  Date: May 7, 2020  Preferred Name: Lynn    Previous PHQ-9:   PHQ-9 SCORE 10/24/2013 8/14/2017 6/19/2019   PHQ-9 Total Score 6 - -   PHQ-9 Total Score MyChart - - 8 (Mild depression)   PHQ-9 Total Score - 9 8     Previous PALAK-7:   PALAK-7 SCORE 7/23/2015 8/14/2017 6/19/2019   Total Score 9 - -   Total Score - - 12 (moderate anxiety)   Total Score - 12 12     RADHA LEVEL:  RADHA Score (Last Two) 3/3/2020   RADHA Raw Score 30   Activation Score 56   RADHA Level 3     Preferred Contact:  Need for : No  Preferred Contact: Cell      Type of Contact Today: Phone call (patient / identified key support person reached)      Data: (subjective / Objective):  Recent ED/IP Admission or Discharge?   None    Patient Goals:  Goal Areas: Mental Health;Social and Interpersonal Relationships;Employment / Volunteer  Patient stated goals: Increase social skills; have more friendships; and find a more fulfilling job using her College degree where I majored in History and minored in Qu[employer]; I would like to overcome my fear of parallel parking.        [facility] Core Service Provided:  Health and Wellness Promotion    Current Stressors / Issues / Care Plan Objective Addressed Today:  Lynn has requested an appt with [facility]    Intervention:  Motivational Interviewing: Supported Autonomy, Collaboration, Evocation   Target Behavior(s): anxiety    Assessment: (Progress on Goals / Homework):  Social Work Care Coordinator called pt to let her know Behavioral Health Clinician 1st Tuesday morning opening is May 26th. Lynn is happy with this appointment time.    Plan: (Homework, other):  Patient was encouraged to continue to seek condition-related information and education.      Scheduled a Phone follow up appointment with HOLLAND CHAU in 4 weeks     Patient has set self-identified goals and will monitor progress until the next  appointment     Odalys MARIN  [facility] Care Coordinator-  [facility]  Tele: [phone]

## 2020-05-07 ENCOUNTER — VIRTUAL VISIT (OUTPATIENT)
Dept: BEHAVIORAL HEALTH | Facility: CLINIC | Age: 25
End: 2020-05-07
Payer: COMMERCIAL

## 2020-05-07 DIAGNOSIS — R69 DIAGNOSIS DEFERRED: Primary | ICD-10-CM

## 2020-05-26 ENCOUNTER — VIRTUAL VISIT (OUTPATIENT)
Dept: BEHAVIORAL HEALTH | Facility: CLINIC | Age: 25
End: 2020-05-26
Payer: COMMERCIAL

## 2020-05-26 DIAGNOSIS — F84.5 ASPERGER'S DISORDER: Primary | ICD-10-CM

## 2020-05-26 PROCEDURE — 90834 PSYTX W PT 45 MINUTES: CPT | Mod: 95 | Performed by: SOCIAL WORKER
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
## 2020-06-01 ENCOUNTER — TELEPHONE (OUTPATIENT)
Dept: BEHAVIORAL HEALTH | Facility: CLINIC | Age: 25
End: 2020-06-01
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
## 2020-06-09 ENCOUNTER — TELEPHONE (OUTPATIENT)
Dept: FAMILY MEDICINE | Facility: CLINIC | Age: 25
End: 2020-06-09

## 2020-06-09 NOTE — TELEPHONE ENCOUNTER
Phone call (patient / identified key support person reached)      patient had a 1030 virtual appointment.  [facility] contacted patient who apologize but had forgotten about appointment.  Patient is currently caring for her 95-year-old grandmother who lives in [place].  Patient will reschedule with the [facility].

## 2020-06-23 ENCOUNTER — TELEPHONE (OUTPATIENT)
Dept: FAMILY MEDICINE | Facility: CLINIC | Age: 25
End: 2020-06-23
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
## 2020-07-08 ENCOUNTER — TELEPHONE (OUTPATIENT)
Dept: BEHAVIORAL HEALTH | Facility: CLINIC | Age: 25
End: 2020-07-08

## 2020-07-14 ENCOUNTER — TELEPHONE (OUTPATIENT)
Dept: BEHAVIORAL HEALTH | Facility: CLINIC | Age: 25
End: 2020-07-14

## 2020-07-14 NOTE — TELEPHONE ENCOUNTER
Behavioral Health Home Services  [facility]: [place]        Social Work Care Navigator Note      Patient: Lynn Vaca  Date: July 14, 2020  Preferred Name: Lynn    Previous PHQ-9:   PHQ-9 SCORE 10/24/2013 8/14/2017 6/19/2019   PHQ-9 Total Score 6 - -   PHQ-9 Total Score MyChart - - 8 (Mild depression)   PHQ-9 Total Score - 9 8     Previous PALAK-7:   PALAK-7 SCORE 7/23/2015 8/14/2017 6/19/2019   Total Score 9 - -   Total Score - - 12 (moderate anxiety)   Total Score - 12 12     RADHA LEVEL:  RADHA Score (Last Two) 3/3/2020   RADHA Raw Score 30   Activation Score 56   RADHA Level 3       Preferred Contact:  Need for : No  Preferred Contact: Cell      Type of Contact Today: Phone call (not reached/unavailable)      Data: (subjective / Objective): LM asking for call back.  Attempted to reach patient, but was unsuccessful.  Plan to attempt again.  TOMAS Alejandra Social Work Care Coordinator

## 2020-07-21 ENCOUNTER — VIRTUAL VISIT (OUTPATIENT)
Dept: BEHAVIORAL HEALTH | Facility: CLINIC | Age: 25
End: 2020-07-21
Payer: COMMERCIAL

## 2020-07-21 DIAGNOSIS — F84.5 ASPERGER'S DISORDER: Primary | ICD-10-CM

## 2020-07-21 PROCEDURE — 90834 PSYTX W PT 45 MINUTES: CPT | Mod: GT | Performed by: SOCIAL WORKER
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
## 2020-08-04 ENCOUNTER — TELEPHONE (OUTPATIENT)
Dept: FAMILY MEDICINE | Facility: CLINIC | Age: 25
End: 2020-08-04

## 2020-08-04 NOTE — TELEPHONE ENCOUNTER
Patient had a 1030 virtual appointment but no showed.  [facility] at the message on patient's voicemail to call back to reschedule.

## 2020-08-06 ENCOUNTER — VIRTUAL VISIT (OUTPATIENT)
Dept: BEHAVIORAL HEALTH | Facility: CLINIC | Age: 25
End: 2020-08-06
Payer: COMMERCIAL

## 2020-08-06 ENCOUNTER — TELEPHONE (OUTPATIENT)
Dept: BEHAVIORAL HEALTH | Facility: CLINIC | Age: 25
End: 2020-08-06

## 2020-08-06 DIAGNOSIS — R69 DIAGNOSIS DEFERRED: Primary | ICD-10-CM

## 2020-08-06 NOTE — PROGRESS NOTES
Behavioral Health Home Services  [facility]: [place] [facility]        Community Health Worker Note      Patient: Lynn Vaca  Date: August 6, 2020  Preferred Name: Lynn    Previous PHQ-9:   PHQ-9 SCORE 10/24/2013 8/14/2017 6/19/2019   PHQ-9 Total Score 6 - -   PHQ-9 Total Score MyChart - - 8 (Mild depression)   PHQ-9 Total Score - 9 8     Previous PALAK-7:   PALAK-7 SCORE 7/23/2015 8/14/2017 6/19/2019   Total Score 9 - -   Total Score - - 12 (moderate anxiety)   Total Score - 12 12     RADHA LEVEL:  RADHA Score (Last Two) 3/3/2020   RADHA Raw Score 30   Activation Score 56   RADHA Level 3       Preferred Contact:  Need for : No  Preferred Contact: Cell      Type of Contact Today: Phone call (patient / identified key support person reached)      Data: (Subjective / Objective):  Recent ED/IP Admission or Discharge?   None    Patient Goals:  Goal Areas: Mental Health;Social and Interpersonal Relationships;Employment / Volunteer  Patient stated goals: Increase social skills; have more friendships; and find a more fulfilling job using her College degree where I majored in History and minored in [employer]; I would like to overcome my fear of parallel parking.      [facility] Core Service Provided:  Health and Wellness Promotion    Current Reported Stressors / Issues / Health Action Plan Items Addressed Today:  Phone call back from Lynn today. She advised that she is doing well. We discussed her missed appointment with Sukhdev our Behavioral Health Clinician. Lynn tells me that she was out on the schedule for work and had to miss the appointment. I offered to reschedule however Lynn would like to wait to get her work schedule and will call back to the clinic to schedule another appointment. Lynn will reach out to Behavioral Health Home team if she has any additional needs.     Plan: (Homework, other):  Patient was encouraged to continue to seek condition-related information and education.      Scheduled a Phone follow up  appointment with [facility] , patient will call to schedule an appointment.       Mary Washington, Community Health Worker

## 2020-08-06 NOTE — TELEPHONE ENCOUNTER
Behavioral Health Home Services  [facility]: [place]        Community Health Worker Note      Patient: Lynn Vaca  Date: August 6, 2020  Preferred Name: Lynn    Previous PHQ-9:   PHQ-9 SCORE 10/24/2013 8/14/2017 6/19/2019   PHQ-9 Total Score 6 - -   PHQ-9 Total Score MyChart - - 8 (Mild depression)   PHQ-9 Total Score - 9 8     Previous PALAK-7:   PALAK-7 SCORE 7/23/2015 8/14/2017 6/19/2019   Total Score 9 - -   Total Score - - 12 (moderate anxiety)   Total Score - 12 12     RADHA LEVEL:  RADHA Score (Last Two) 3/3/2020   RADHA Raw Score 30   Activation Score 56   RADHA Level 3       Preferred Contact:  Need for : No  Preferred Contact: Cell      Type of Contact Today: Phone call (not reached/unavailable)      Data: (Subjective / Objective):  Attempted to reach patient, but was unsuccessful.  Plan to attempt again.  Mary Washington

## 2020-08-13 ENCOUNTER — VIRTUAL VISIT (OUTPATIENT)
Dept: BEHAVIORAL HEALTH | Facility: CLINIC | Age: 25
End: 2020-08-13
Payer: COMMERCIAL

## 2020-08-13 ENCOUNTER — TELEPHONE (OUTPATIENT)
Dept: BEHAVIORAL HEALTH | Facility: CLINIC | Age: 25
End: 2020-08-13

## 2020-08-13 DIAGNOSIS — R69 DIAGNOSIS DEFERRED: Primary | ICD-10-CM
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
## 2020-08-13 NOTE — TELEPHONE ENCOUNTER
Behavioral Health Home Services  [facility]: [place]        Social Work Care Navigator Note      Patient: Lynn Vaca  Date: August 13, 2020  Preferred Name: Lynn    Previous PHQ-9:   PHQ-9 SCORE 10/24/2013 8/14/2017 6/19/2019   PHQ-9 Total Score 6 - -   PHQ-9 Total Score MyChart - - 8 (Mild depression)   PHQ-9 Total Score - 9 8     Previous PALAK-7:   PALAK-7 SCORE 7/23/2015 8/14/2017 6/19/2019   Total Score 9 - -   Total Score - - 12 (moderate anxiety)   Total Score - 12 12     RADHA LEVEL:  RADHA Score (Last Two) 3/3/2020   RADHA Raw Score 30   Activation Score 56   RADHA Level 3       Preferred Contact:  Need for : No  Preferred Contact: Cell      Type of Contact Today: Phone call (not reached/unavailable)      Data: (subjective / Objective): Updated Health Action Plan goals due tomorrow. Left Lynn a detailed message.  Attempted to reach patient, but was unsuccessful.  Plan to attempt again.  TOMAS Alejandra [facility]

## 2020-08-13 NOTE — Clinical Note
FYI: Last PCP appt 6/19/19; Behavioral Health Clinician appt sched by Odalys 9/1/20 11am. Sherri-her goals remain the same. Thank you. Odalys

## 2020-09-01 ENCOUNTER — TELEPHONE (OUTPATIENT)
Dept: FAMILY MEDICINE | Facility: CLINIC | Age: 25
End: 2020-09-01

## 2020-09-09 ENCOUNTER — VIRTUAL VISIT (OUTPATIENT)
Dept: BEHAVIORAL HEALTH | Facility: CLINIC | Age: 25
End: 2020-09-09
Payer: COMMERCIAL

## 2020-09-09 DIAGNOSIS — R69 DIAGNOSIS DEFERRED: Primary | ICD-10-CM
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
## 2020-10-08 ENCOUNTER — HOSPITAL ENCOUNTER (EMERGENCY)
Facility: CLINIC | Age: 25
Discharge: HOME | End: 2020-10-08
Attending: EMERGENCY MEDICINE | Admitting: EMERGENCY MEDICINE
Payer: COMMERCIAL

## 2020-10-08 VITALS
HEART RATE: 76 BPM | SYSTOLIC BLOOD PRESSURE: 118 MMHG | TEMPERATURE: 98.3 F | DIASTOLIC BLOOD PRESSURE: 84 MMHG | OXYGEN SATURATION: 100 % | RESPIRATION RATE: 16 BRPM

## 2020-10-08 DIAGNOSIS — S81.812A LACERATION OF LEFT CALF: ICD-10-CM

## 2020-10-08 PROCEDURE — 12001 RPR S/N/AX/GEN/TRNK 2.5CM/<: CPT | Performed by: EMERGENCY MEDICINE

## 2020-10-08 PROCEDURE — 99282 EMERGENCY DEPT VISIT SF MDM: CPT | Mod: 25 | Performed by: EMERGENCY MEDICINE

## 2020-10-08 PROCEDURE — 99282 EMERGENCY DEPT VISIT SF MDM: CPT | Performed by: EMERGENCY MEDICINE

## 2020-10-08 RX ORDER — LIDOCAINE HYDROCHLORIDE AND EPINEPHRINE 10; 10 MG/ML; UG/ML
10 INJECTION, SOLUTION INFILTRATION; PERINEURAL ONCE
Status: DISCONTINUED | OUTPATIENT
Start: 2020-10-08 | End: 2020-10-08 | Stop reason: HOSPADM

## 2020-10-08 NOTE — ED PROVIDER NOTES
History     Chief Complaint   Patient presents with     Laceration     left leg: piece of glass in trash bag scraped back of leg: .5 hour ago     HPI  Lynn Vaca is a 24 year old female with a past medical history of ADHD, Asperger's, migraines, eczema, anxiety who presents to the emergency department with a chief complaint of laceration.  Located on the back of her left leg.  Occurred when a piece of glass in a trash bag scraped the back of her leg approximately 30 minutes ago.  No other associated injuries or associated symptoms.  This injury occurred at work. Last tetanus shot was in 2016 per [facility].     I have reviewed the Medications, Allergies, Past Medical and Surgical History, and Social History in the [facility] system.    Past Medical History:   Diagnosis Date     ADHD (attention deficit hyperactivity disorder)     has been on concerta, strattera, and daytrana     Asperger's syndrome      Common migraine 4/17/2013     Eczema      Generalised anxiety disorder      Inguinal hernia without mention of obstruction or gangrene, unilateral or unspecified, (not specified as recurrent) 2002    Right     OCD (obsessive compulsive disorder)     was on luvox as a child     Oppositional defiant disorder of childhood or adolescence      Past Surgical History:   Procedure Laterality Date     HERNIA REPAIR, INGUINAL RT/LT  2002     No current facility-administered medications for this encounter.      No current outpatient medications on file.     Allergies   Allergen Reactions     Bactrim [Sulfamethoxazole W-Trimethoprim] Itching and Swelling     11/27/13 - itching and swelling.     Past medical history, past surgical history, medications, and allergies were reviewed with the patient. Additional pertinent items: None    Social History     Socioeconomic History     Marital status: Single     Spouse name: Not on file     Number of children: Not on file     Years of education: Not on file     Highest education level: Not on  file   Occupational History     Not on file   Social Needs     Financial resource strain: Not on file     Food insecurity     Worry: Not on file     Inability: Not on file     Transportation needs     Medical: Not on file     Non-medical: Not on file   Tobacco Use     Smoking status: Never Smoker     Smokeless tobacco: Never Used   Substance and Sexual Activity     Alcohol use: No     Drug use: No     Sexual activity: Never     Comment: wonders about sexual orientation, but has attraction to neither sex   Lifestyle     Physical activity     Days per week: Not on file     Minutes per session: Not on file     Stress: Not on file   Relationships     Social connections     Talks on phone: Not on file     Gets together: Not on file     Attends Mandaen service: Not on file     Active member of club or organization: Not on file     Attends meetings of clubs or organizations: Not on file     Relationship status: Not on file     Intimate partner violence     Fear of current or ex partner: Not on file     Emotionally abused: Not on file     Physically abused: Not on file     Forced sexual activity: Not on file   Other Topics Concern     Parent/sibling w/ CABG, MI or angioplasty before 65F 55M? Not Asked   Social History Narrative    Environmental History    Child is around people that smoke: No     Child's home has well water: No    Child's home has filtered water:No    Child has pets in the home: Yes, 1 dog    Child's home/apartment was built before 1950:Yes    Child attends : No    Child has exposure to sibling/playmate with lead poisoning: No    Child has exposure to someone with tuberculosis: No    Child home have guns/firearms without trigger locks: No    Child home have guns/firearms with trigger locks: No    There are concerns about the child's exposure to violence in the home: No        Family Information:    Parent #1    Name: veronika ? Education: PhD Occupation: disabled    Parent #2    Name:     Education:     Occupation:         Relationship Status of Parent(s): other    Who does the child live with? mother    What language(s) is/are spoken at home? English                  Social history was reviewed with the patient. Additional pertinent items: None    Review of Systems  General: No fevers or chills  Skin: No rash or diaphoresis, positive for laceration  Eyes: No eye redness or discharge  Ears/Nose/Throat: No rhinorrhea or nasal congestion  Respiratory: No cough or SOB  Cardiovascular: No chest pain or palpitations  Gastrointestinal: No nausea, vomiting, or diarrhea  Genitourinary: No urinary frequency, hematuria, or dysuria  Musculoskeletal: No arthralgias or myalgias  Neurologic: No numbness or weakness  Psychiatric: No depression or SI  Hematologic/Lymphatic/Immunologic: No leg swelling, no easy bruising/bleeding  Endocrine: No polyuria/polydypsia    A complete review of systems was performed with pertinent positives and negatives noted in the HPI, and all other systems negative.    Physical Exam   BP: 119/87  Pulse: 78  Temp: 99.1  F (37.3  C)  Resp: 16  SpO2: 99 %      General: Well nourished, well developed, NAD  HEENT: EOMI, anicteric. NCAT, MMM  Neck: no jugular venous distension, supple, nl ROM  Cardiac: Regular rate and rhythm.  Normal capillary refill.  Intact peripheral pulses  Pulm: Airway patent, nonlabored breathing  Skin: Warm and dry to the touch.  2 cm linear vertical laceration to posterior left calf with minimal active bleeding, distally neurovascularly intact  Extremities: No LE edema, no cyanosis, w/w/p, see skin exam above  Neuro: A&Ox3, no gross focal deficits    ED Course        [facility]     -Laceration Repair    Date/Time: 10/8/2020 5:38 PM  Performed by: Vikki Rice MD  Authorized by: Vikki Rice MD       ANESTHESIA (see MAR for exact dosages):     Anesthesia method:  Local infiltration    Local anesthetic:  Procaine 1% WITH  epi  LACERATION DETAILS     Location:  Leg    Leg location:  L lower leg    Length (cm):  2    Depth (mm):  10    REPAIR TYPE:     Repair type:  Simple      EXPLORATION:     Hemostasis achieved with:  Direct pressure    Wound exploration: wound explored through full range of motion and entire depth of wound probed and visualized      Wound extent: no foreign body, no signs of injury, no nerve damage, no tendon damage and no vascular damage      TREATMENT:     Wound cleansed with: chloraprep.    Amount of cleaning:  Standard    Irrigation solution:  Sterile saline    Irrigation volume:  1 L    Irrigation method:  Pressure wash    SKIN REPAIR     Repair method:  Sutures    Suture size:  3-0    Suture material:  Prolene    Suture technique:  Simple interrupted    Number of sutures:  4    APPROXIMATION     Approximation:  Close    POST-PROCEDURE DETAILS     Dressing:  Adhesive bandage      PROCEDURE   Patient Tolerance:  Patient tolerated the procedure well with no immediate complications                                 Labs Ordered and Resulted from Time of ED Arrival Up to the Time of Departure from the ED - No data to display         No results found for this or any previous visit (from the past 24 hour(s)).    Labs, vital signs, and imaging studies were reviewed by me.    Medications - No data to display    Assessments & Plan (with Medical Decision Making)   Lynn Vaca is a 24 year old female who presents with laceration. Tdap is UTD. Laceration repaired as described above.     I have reviewed the nursing notes.    I have reviewed the findings, diagnosis, plan and need for follow up with the patient.    Patient to be discharged home. Advised to follow up with PCP or any urgent care/ER for suture removal in 7 to 10 days. To return to ER immediately with any new/worsening symptoms, particularly any signs of developing infection. Plan of care discussed with patient who expresses understanding and agrees with  plan of care.      There are no discharge medications for this patient.      Final diagnoses:   Laceration of left calf       10/8/2020   [facility] EMERGENCY DEPARTMENT     Vikki Rice MD  10/08/20 1929

## 2020-10-08 NOTE — DISCHARGE INSTRUCTIONS
TODAY'S VISIT:  You were seen today for laceration   -   - If you had any labs or imaging/radiology tests performed today, you should also discuss these tests with your usual provider.     FOLLOW-UP:  Please make an appointment to follow up with:  - Your Primary Care Provider. If you do not have a PCP, please call the Primary Care Center (phone: [phone] for an appointment for suture removal in 7 to 10 days.  You may also go to any urgent care or ER    - Have your provider review the results from today's visit with you again to make sure no further follow-up or additional testing is needed based on those results.     PRESCRIPTIONS / MEDICATIONS:  -Use Tylenol and/or ibuprofen for pain control as needed at home    OTHER INSTRUCTIONS:  - Please follow up with your PCP for suture removal in 7-10 days. You may also return here or go to any urgent care or ER for suture removal.  Keep the affected area as clean and dry as it is possible.  If one was applied, leave the dressing that was applied in the emergency department in place for 24 hours, after which you may leave the wound uncovered or change the dressing daily or whenever it becomes soiled.  Make sure to keep the wound covered when participating in any activities in which the affected area may become dirty.  Avoid swimming or submerging the affected area in the tub/bath for at least 1 week.  Showering is okay. Return to the emergency department immediately if you develop any signs of infection, including fevers/chills or increasing pain/redness/swelling/warmth/cloudy discharge from around the wound or if bleeding from the wound occurs and cannot be controlled with direct pressure to the wound. You may apply bacitracin to the laceration twice daily. Make sure to use sunscreen in the future whenever sun exposure to the affected area may occur in order to help minimize scarring.      RETURN TO THE EMERGENCY DEPARTMENT  Return to the Emergency Department at any  time for any new or worsening symptoms or any concerns.

## 2020-10-08 NOTE — ED NOTES
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

## 2020-10-15 ENCOUNTER — HOSPITAL ENCOUNTER (EMERGENCY)
Facility: CLINIC | Age: 25
Discharge: HOME | End: 2020-10-15
Attending: FAMILY MEDICINE | Admitting: FAMILY MEDICINE
Payer: OTHER MISCELLANEOUS

## 2020-10-15 VITALS
HEART RATE: 71 BPM | RESPIRATION RATE: 16 BRPM | DIASTOLIC BLOOD PRESSURE: 80 MMHG | SYSTOLIC BLOOD PRESSURE: 111 MMHG | TEMPERATURE: 99.2 F | OXYGEN SATURATION: 100 %

## 2020-10-15 DIAGNOSIS — Z48.02 VISIT FOR SUTURE REMOVAL: ICD-10-CM

## 2020-10-15 PROCEDURE — 99281 EMR DPT VST MAYX REQ PHY/QHP: CPT | Performed by: FAMILY MEDICINE

## 2020-10-15 NOTE — ED AVS SNAPSHOT
[facility] Emergency Department  [address]  [place] [address]  Phone: [phone]  Fax: [phone]                                    Lynn Vaca   MRN: [id number]    Department: [facility] Emergency Department   Date of Visit: 10/15/2020           After Visit Summary Signature Page    I have received my discharge instructions, and my questions have been answered. I have discussed any challenges I see with this plan with the nurse or doctor.    ..........................................................................................................................................  Patient/Patient Representative Signature      ..........................................................................................................................................  Patient Representative Print Name and Relationship to Patient    ..................................................               ................................................  Date                                   Time    ..........................................................................................................................................  Reviewed by Signature/Title    ...................................................              ..............................................  Date                                               Time          22EPIC Rev 08/18

## 2020-10-15 NOTE — ED PROVIDER NOTES
[facility] [place] [facility] ([facility])     October 15, 2020    History     Chief Complaint   Patient presents with     Suture Removal     Suture removal, left calf      HPI  Lynn Vaca is a 24 year old female who presents to the Emergency Department for suture removal. Patient presented to the ED on 10/8/2020 with a laceration on the posterior left lower leg following being scraped by a piece of glass. Laceration was repaired and she now presents to the ED seeking suture removal.  Patient denies any complaints.      PAST MEDICAL HISTORY:   Past Medical History:   Diagnosis Date     ADHD (attention deficit hyperactivity disorder)     has been on concerta, strattera, and daytrana     Asperger's syndrome      Common migraine 4/17/2013     Eczema      Generalised anxiety disorder      Inguinal hernia without mention of obstruction or gangrene, unilateral or unspecified, (not specified as recurrent) 2002    Right     OCD (obsessive compulsive disorder)     was on luvox as a child     Oppositional defiant disorder of childhood or adolescence        PAST SURGICAL HISTORY:   Past Surgical History:   Procedure Laterality Date     HERNIA REPAIR, INGUINAL RT/LT  2002       Past medical history, past surgical history, medications, and allergies were reviewed with the patient. Additional pertinent items: None    FAMILY HISTORY:   Family History   Problem Relation Age of Onset     Arthritis Mother         at age 22     Diabetes Maternal Grandmother         type II       SOCIAL HISTORY:   Social History     Tobacco Use     Smoking status: Never Smoker     Smokeless tobacco: Never Used   Substance Use Topics     Alcohol use: No     Social history was reviewed with the patient. Additional pertinent items: None      Patient's Medications    No medications on file          Allergies   Allergen Reactions     Bactrim [Sulfamethoxazole W-Trimethoprim] Itching and Swelling     11/27/13 - itching and swelling.         Review of Systems  A complete review of systems was performed with pertinent positives and negatives noted in the HPI, and all other systems negative.    Physical Exam   BP: 101/66  Pulse: 72  Temp: 99.2  F (37.3  C)  SpO2: 100 %      Physical Exam  Constitutional:       Appearance: Normal appearance.   HENT:      Mouth/Throat:      Mouth: Mucous membranes are moist.   Eyes:      Pupils: Pupils are equal, round, and reactive to light.   Pulmonary:      Effort: Pulmonary effort is normal.   Musculoskeletal:        Legs:    Neurological:      Mental Status: She is alert.         ED Course        Procedures                           No results found for this or any previous visit (from the past 24 hour(s)).  Medications - No data to display          Assessments & Plan (with Medical Decision Making)   A patient who had a laceration repaired a week ago and is here for suture removal.  She has had no problems with the injury, and the wound appears to be healing appropriately.  The sutures removed without complication and she was given instructions.    I have reviewed the nursing notes.    I have reviewed the findings, diagnosis, plan and need for follow up with the patient.    New Prescriptions    No medications on file       Final diagnoses:   Visit for suture removal       10/15/2020   [facility] EMERGENCY DEPARTMENT     Dav Rivera MD  10/15/20 [id number]

## 2020-10-15 NOTE — DISCHARGE INSTRUCTIONS
Thank you for choosing [facility].     Please closely monitor for further symptoms. Return to the Emergency Department if you develop any new or worsening signs or symptoms.    If you received any opiate pain medications or sedatives during your visit, please do not drive for at least 8 hours.     Labs, cultures or final xray interpretations may still need to be reviewed.  We will call you if your plan of care needs to be changed.    Please follow up with your primary care physician or clinic as needed.

## 2020-10-19 ENCOUNTER — TELEPHONE (OUTPATIENT)
Dept: BEHAVIORAL HEALTH | Facility: CLINIC | Age: 25
End: 2020-10-19

## 2020-10-19 NOTE — TELEPHONE ENCOUNTER
Behavioral Health Home Services  [facility]: [place]        Community Health Worker Note      Patient: Lynn Vaca  Date: October 19, 2020  Preferred Name: Lynn    Previous PHQ-9:   PHQ-9 SCORE 10/24/2013 8/14/2017 6/19/2019   PHQ-9 Total Score 6 - -   PHQ-9 Total Score MyChart - - 8 (Mild depression)   PHQ-9 Total Score - 9 8     Previous PALAK-7:   PALAK-7 SCORE 7/23/2015 8/14/2017 6/19/2019   Total Score 9 - -   Total Score - - 12 (moderate anxiety)   Total Score - 12 12     RADHA LEVEL:  RADHA Score (Last Two) 3/3/2020   RADHA Raw Score 30   Activation Score 56   RADHA Level 3       Preferred Contact:  Need for : No  Preferred Contact: Cell      Type of Contact Today: Phone call (not reached/unavailable)      Data: (Subjective / Objective):  Attempted to reach patient, but was unsuccessful.  Plan to attempt again.  Mary [place]

## 2020-10-22 ENCOUNTER — VIRTUAL VISIT (OUTPATIENT)
Dept: BEHAVIORAL HEALTH | Facility: CLINIC | Age: 25
End: 2020-10-22
Payer: OTHER MISCELLANEOUS

## 2020-10-22 DIAGNOSIS — R69 DIAGNOSIS DEFERRED: Primary | ICD-10-CM

## 2020-10-22 NOTE — PROGRESS NOTES
Behavioral Health Home Services  [facility]: Jose Enrique        Social Work Care Navigator Note      Patient: Lynn Vaca  Date: October 22, 2020  Preferred Name: Lynn    Previous PHQ-9:   PHQ-9 SCORE 10/24/2013 8/14/2017 6/19/2019   PHQ-9 Total Score 6 - -   PHQ-9 Total Score MyChart - - 8 (Mild depression)   PHQ-9 Total Score - 9 8     Previous PALAK-7:   PALAK-7 SCORE 7/23/2015 8/14/2017 6/19/2019   Total Score 9 - -   Total Score - - 12 (moderate anxiety)   Total Score - 12 12     RADHA LEVEL:  RADHA Score (Last Two) 3/3/2020   RADHA Raw Score 30   Activation Score 56   RADHA Level 3       Preferred Contact:  Need for : No  Preferred Contact: Cell      Type of Contact Today: Phone call (patient / identified key support person reached)      Data: (subjective / Objective):  Recent ED/IP Admission or Discharge?   None    Patient Goals:  Goal Areas: Mental Health;Social and Interpersonal Relationships;Employment / Volunteer  Patient stated goals: Increase social skills; have more friendships; and find a more fulfilling job using her College degree where I majored in History and minored in Zoro[employer]; I would like to overcome my fear of parallel parking.        [facility] Core Service Provided:  Health and Wellness Promotion    Current Stressors / Issues / Care Plan Objective Addressed Today:  [facility] called patient for monthly update. She states things are going well for her right now. We discussed her ED visit due to the laceration in her leg. She states it happened when she was at work and originally was not going to go to the ED until a co-worker encouraged her to do so. She presented at the ED and received stitches. Patient was relieved it was not worse than it was.    [facility] asked patient if she was aware the Community Health Worker's last day is tomorrow with the Behavioral Health Home ([facility]) team. Patient confirmed she was aware.    [facility] offered to schedule appointment with Behavioral Health Clinician, which patient  agreed. Scheduled Video Visit on 11/10/2020 at 9am.    Patient reported she does not need any assistance at this time but will reach out to this worker if needs arise.    Intervention:  Motivational Interviewing: Supported Autonomy, Collaboration, Evocation and Permission to raise concern or advise   Target Behavior(s): Explored thoughts and readiness to participate in individual therapy, Explored and resolved challenges to attending appointments as scheduled and Explored current social supports and reinforced opportunities to increase engagement    Assessment: (Progress on Goals / Homework):  Patient would benefit from continued coordination in reaching their goals set for the Behavioral Health Home [facility] program. [facility] reviewed Health Action Plan goals and will continue to monitor progress and work with patient and their care team.      Plan: (Homework, other):  Patient was encouraged to continue to seek condition-related information and education.      Scheduled a Video Visit follow up appointment with [facility] in 2 weeks     Patient has set self-identified goals and will monitor progress until the next appointment on: 11/10/2020.         TOMAS Medrano  Behavioral Health [place] ([facility])   [facility]  [phone]

## 2020-11-10 ENCOUNTER — VIRTUAL VISIT (OUTPATIENT)
Dept: BEHAVIORAL HEALTH | Facility: CLINIC | Age: 25
End: 2020-11-10
Payer: COMMERCIAL

## 2020-11-10 DIAGNOSIS — F84.5 ASPERGER'S DISORDER: ICD-10-CM

## 2020-11-10 DIAGNOSIS — F41.1 GENERALIZED ANXIETY DISORDER: Primary | ICD-10-CM

## 2020-11-10 PROCEDURE — 90834 PSYTX W PT 45 MINUTES: CPT | Mod: 95 | Performed by: SOCIAL WORKER
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
## 2020-11-16 ENCOUNTER — ALLIED HEALTH/NURSE VISIT (OUTPATIENT)
Dept: BEHAVIORAL HEALTH | Facility: CLINIC | Age: 25
End: 2020-11-16
Payer: OTHER MISCELLANEOUS

## 2020-11-16 DIAGNOSIS — R69 DIAGNOSIS DEFERRED: Primary | ICD-10-CM

## 2020-11-16 NOTE — PROGRESS NOTES
"Behavioral Health Home Services  [facility]: Jose Enrique        Social Work Care Navigator Note      Patient: Lynn Vaca  Date: November 16, 2020  Preferred Name: Lynn    Previous PHQ-9:   PHQ-9 SCORE 10/24/2013 8/14/2017 6/19/2019   PHQ-9 Total Score 6 - -   PHQ-9 Total Score MyChart - - 8 (Mild depression)   PHQ-9 Total Score - 9 8     Previous PALAK-7:   PALAK-7 SCORE 7/23/2015 8/14/2017 6/19/2019   Total Score 9 - -   Total Score - - 12 (moderate anxiety)   Total Score - 12 12     RADHA LEVEL:  RADHA Score (Last Two) 3/3/2020   RADHA Raw Score 30   Activation Score 56   RADHA Level 3       Preferred Contact:  Need for : No  Preferred Contact: Cell      Type of Contact Today: Phone call (patient / identified key support person reached)      Data: (subjective / Objective):  Recent ED/IP Admission or Discharge?   None    Patient Goals:  Goal Areas: Mental Health;Social and Interpersonal Relationships;Employment / Volunteer  Patient stated goals: Increase social skills; have more friendships; and find a more fulfilling job using her College degree where I majored in History and minored in [employer]az[employer]; I would like to overcome my fear of parallel parking.        [facility] Core Service Provided:  Health and Wellness Promotion  Referral to Community and Social Support Services: Provided patient with referrals (see plan section)    Current Stressors / Issues / Care Plan Objective Addressed Today:  [facility] called patient following request from Behavioral Health Clinician. Patient had expressed interest in finding a new job through the [employer] program. Patient confirmed that she has worked at her current position for 7 years and is \"just ready for a change.\" Ideally, patient would like to work at the [employer] but doesn't have a specific position that she is interested in. [facility] asked if anyone has assisted her through the [employer] program to obtain her current position, which patient denied.  [facility] reviewed [employer] website. Patient " states she has never logged on or did not remember logging into an account for her current position. [facility] explained the website and that it has job openings available if she has an account. [facility] also explained there is a phone number that can be utilized for general questions as well. Patient was appreciative of the information and requested it be sent via email.    Patient denied needing any further assistance and will contact this worker if questions arise.    [facility] sent patient the following email:  Adrian Bailey,  It was great talking to you today! As we had discussed, the [employer] program has a website that looks to be very helpful in terms of looking at different positions that would be available. You may use the following link to view applicant information, including job openings: [place]://[place]/mmb[place]careers/applicant-help/     If you would like to receive assistance or get your questions answered regarding the [employer] program, I have listed their contact information below from their website:    [place] [employer] [place] [employer] staff are happy to assist you with the online application system and answer general questions about state employment. Contact us at:    Email: [email or web address].    Phone: [phone]    Address: [address] [place] [address], [address] [place] [address]    Hours of Operation: Monday through Friday, 9:00 a.m. to 4:00 p.m. (except state holidays)  Please let me know if you need any additional assistance. My contact information is listed below.  Thank you,        Intervention:  Motivational Interviewing: Expressed Empathy/Understanding, Supported Autonomy, Collaboration, Evocation, Permission to raise concern or advise and Open-ended questions   Target Behavior(s): Explored current social supports and reinforced opportunities to increase engagement    Assessment: (Progress on Goals / Homework):  Patient would benefit from continued coordination in reaching  their goals set for the Behavioral Health Home ([facility]) program. [facility] reviewed Health Action Plan goals and will continue to monitor progress and work with patient and their care team.      Plan: (Homework, other):  Patient was encouraged to continue to seek condition-related information and education.      TOMAS Medrano  Behavioral Health Home ([facility])   [facility]  [phone]

## 2020-12-01 ENCOUNTER — ALLIED HEALTH/NURSE VISIT (OUTPATIENT)
Dept: BEHAVIORAL HEALTH | Facility: CLINIC | Age: 25
End: 2020-12-01
Payer: OTHER MISCELLANEOUS

## 2020-12-01 DIAGNOSIS — R69 DIAGNOSIS DEFERRED: Primary | ICD-10-CM

## 2020-12-01 NOTE — PROGRESS NOTES
Behavioral Health Home Services  [facility]: Jose Enrique        Social Work Care Navigator Note      Patient: Lynn Vaca  Date: December 1, 2020  Preferred Name: Lynn    Previous PHQ-9:   PHQ-9 SCORE 10/24/2013 8/14/2017 6/19/2019   PHQ-9 Total Score 6 - -   PHQ-9 Total Score MyChart - - 8 (Mild depression)   PHQ-9 Total Score - 9 8     Previous PALAK-7:   PALAK-7 SCORE 7/23/2015 8/14/2017 6/19/2019   Total Score 9 - -   Total Score - - 12 (moderate anxiety)   Total Score - 12 12     RADHA LEVEL:  RADHA Score (Last Two) 3/3/2020   RADAH Raw Score 30   Activation Score 56   RADHA Level 3       Preferred Contact:  Need for : No  Preferred Contact: Cell      Type of Contact Today: Phone call (patient / identified key support person reached)      Data: (subjective / Objective):  Recent ED/IP Admission or Discharge?   None    Patient Goals:  Goal Areas: Mental Health;Social and Interpersonal Relationships;Employment / Volunteer  Patient stated goals: Increase social skills; have more friendships; and find a more fulfilling job using her College degree where I majored in History and minored in [employer]; I would like to overcome my fear of parallel parking.        [facility] Core Service Provided:  Care Coordination: provided care management services/referrals necessary to ensure patient and their identified supports have access to medical, behavioral health, pharmacology and recovery support services.  Ensured that patient's care is integrated across all settings and services.   Health and Wellness Promotion  Referral to Community and Social Support Services: Followed-up with patient on whether or not they completed a referral    Current Stressors / Issues / Care Plan Objective Addressed Today:  [facility] called patient to check in regarding the [employer] program. She states she has not had a chance to review the website this worker had sent last month but also states she never received an email. [facility] confirmed email address was  correct and resent email. Patient confirmed it was received.    [facility] explained that through the link, she may [place] for the [employer] program as well as view current job openings. Patient thanked worker for explaining this. CC offered to assist patient with this process but patient denied neeing assistance at this time. She states she will reach out if needed.    Patient states her work continues going well and she denied needing any additional assistance at this time.    Intervention:  Motivational Interviewing: Supported Autonomy, Collaboration, Evocation, Permission to raise concern or advise and Open-ended questions   Target Behavior(s): Explored current social supports and reinforced opportunities to increase engagement and Employment    Assessment: (Progress on Goals / Homework):  Patient would benefit from continued coordination in reaching their goals set for the Behavioral Health Home ([facility]) program. [facility] reviewed Health Action Plan goals and will continue to monitor progress and work with patient and their care team.      Plan: (Homework, other):  Patient was encouraged to continue to seek condition-related information and education.      TOMAS Medrano  Behavioral Health Home ([facility])   [facility]  [phone]

## 2020-12-08 ENCOUNTER — VIRTUAL VISIT (OUTPATIENT)
Dept: BEHAVIORAL HEALTH | Facility: CLINIC | Age: 25
End: 2020-12-08
Payer: COMMERCIAL

## 2020-12-08 DIAGNOSIS — F84.5 ASPERGER'S DISORDER: Primary | ICD-10-CM

## 2020-12-08 PROCEDURE — 90834 PSYTX W PT 45 MINUTES: CPT | Mod: 95 | Performed by: SOCIAL WORKER
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
## 2021-01-10 ENCOUNTER — HEALTH MAINTENANCE LETTER (OUTPATIENT)
Age: 26
End: 2021-01-10

## 2021-01-26 ENCOUNTER — ALLIED HEALTH/NURSE VISIT (OUTPATIENT)
Dept: BEHAVIORAL HEALTH | Facility: CLINIC | Age: 26
End: 2021-01-26
Payer: OTHER MISCELLANEOUS

## 2021-01-26 DIAGNOSIS — R69 DIAGNOSIS DEFERRED: Primary | ICD-10-CM

## 2021-01-26 NOTE — PROGRESS NOTES
"Behavioral Health Home Services  [facility]: Jose Enrique        Social Work Care Navigator Note      Patient: Lynn Vaca  Date: January 26, 2021  Preferred Name: Lynn    Previous PHQ-9:   PHQ-9 SCORE 10/24/2013 8/14/2017 6/19/2019   PHQ-9 Total Score 6 - -   PHQ-9 Total Score MyChart - - 8 (Mild depression)   PHQ-9 Total Score - 9 8     Previous PALAK-7:   PALAK-7 SCORE 7/23/2015 8/14/2017 6/19/2019   Total Score 9 - -   Total Score - - 12 (moderate anxiety)   Total Score - 12 12     RADHA LEVEL:  RADHA Score (Last Two) 3/3/2020   RADHA Raw Score 30   Activation Score 56   RADHA Level 3       Preferred Contact:  Need for : No  Preferred Contact: Cell      Type of Contact Today: Phone call (patient / identified key support person reached)      Data: (subjective / Objective):  Recent ED/IP Admission or Discharge?   None    Patient Goals:  Goal Areas: Mental Health;Social and Interpersonal Relationships;Employment / Volunteer  Patient stated goals: Increase social skills; have more friendships; and find a more fulfilling job using her College degree where I majored in History and minored in Sikhism; I would like to overcome my fear of parallel parking.        [facility] Core Service Provided:  Care Coordination: provided care management services/referrals necessary to ensure patient and their identified supports have access to medical, behavioral health, pharmacology and recovery support services.  Ensured that patient's care is integrated across all settings and services.   Health and Wellness Promotion    Current Stressors / Issues / Care Plan Objective Addressed Today:  HOLLAND called patient for monthly check-in. She states things are going well for her and when this worker inquired what was going particularly well, she responded, \"well I guess there isn't anything going bad so that's a good thing.\"     LIVIER inquired if patient had been able to review the [employer] program website yet, which she denied as she had " forgotten. [facility] inquired if she would like assistance from this worker to review website and get started, which patient denied as well. [facility] briefly explained the process of setting up an account and being able to view open job postings within the website which patient understood. CC offered to assist patient, if she would like assistance in the future, which patient thanked worker.    SWCC inquired on how her leg was healing after October incident. She states it has been healing very well, just has a scar where laceration occurred.    [facility] discussed the Authorization for Electronic Communication form with patient. Patient agreed and provided verbal consent to sign the form.  [facility] completed form and sent to [facility] for scanning.    Patient denied needing additional assistance. [facility] informed patient that her Health Action Plan Review is due next month. Scheduled appointment on 2/2 at 9:30am to complete.    Intervention:  Motivational Interviewing: Expressed Empathy/Understanding, Supported Autonomy, Collaboration, Evocation, Permission to raise concern or advise and Open-ended questions   Target Behavior(s): Explored and resolved challenges to attending appointments as scheduled and Explored current social supports and reinforced opportunities to increase engagement    Assessment: (Progress on Goals / Homework):  Patient would benefit from continued coordination in reaching their goals set for the Behavioral Health Home ([facility]) program. [facility] reviewed Health Action Plan goals and will continue to monitor progress and work with patient and their care team.      Plan: (Homework, other):  Patient was encouraged to continue to seek condition-related information and education.      Scheduled a Phone follow up appointment with HOLLAND CHAU in 1 week     Patient has set self-identified goals and will monitor progress until the next appointment on: 2/2/2021.         TOMAS Medrano  [facility] [place] ([facility]) Social  Worker  Supporting the [facility] and [facility]  [phone]

## 2021-02-02 ENCOUNTER — ALLIED HEALTH/NURSE VISIT (OUTPATIENT)
Dept: BEHAVIORAL HEALTH | Facility: CLINIC | Age: 26
End: 2021-02-02
Payer: COMMERCIAL

## 2021-02-02 DIAGNOSIS — R69 DIAGNOSIS DEFERRED: Primary | ICD-10-CM

## 2021-02-02 ASSESSMENT — ANXIETY QUESTIONNAIRES
3. WORRYING TOO MUCH ABOUT DIFFERENT THINGS: NEARLY EVERY DAY
4. TROUBLE RELAXING: SEVERAL DAYS
7. FEELING AFRAID AS IF SOMETHING AWFUL MIGHT HAPPEN: NEARLY EVERY DAY
5. BEING SO RESTLESS THAT IT IS HARD TO SIT STILL: NOT AT ALL
2. NOT BEING ABLE TO STOP OR CONTROL WORRYING: SEVERAL DAYS
1. FEELING NERVOUS, ANXIOUS, OR ON EDGE: NEARLY EVERY DAY
6. BECOMING EASILY ANNOYED OR IRRITABLE: NEARLY EVERY DAY
IF YOU CHECKED OFF ANY PROBLEMS ON THIS QUESTIONNAIRE, HOW DIFFICULT HAVE THESE PROBLEMS MADE IT FOR YOU TO DO YOUR WORK, TAKE CARE OF THINGS AT HOME, OR GET ALONG WITH OTHER PEOPLE: VERY DIFFICULT
GAD7 TOTAL SCORE: 14

## 2021-02-02 ASSESSMENT — PATIENT HEALTH QUESTIONNAIRE - PHQ9: SUM OF ALL RESPONSES TO PHQ QUESTIONS 1-9: 9

## 2021-02-02 NOTE — LETTER
February 3, 2021    [facility]  [address], [address]  [place] [address]    Dear Lynn,    I have attached the Health Action Plan (HAP) that we completed together that includes your goals for Behavioral Health Home ([facility]) Services. As you continue being enrolled in Behavioral Health Home ([facility]) services, I wanted to give you some information so you know what to expect moving forward.    What to Expect on a Monthly Basis: It is a requirement that a member of the Behavioral Health Home ([facility]) team make contact with you on a monthly basis either by phone, virtual/video visit, or in-person in the clinic (this is not currently available due to COVID19). During these check-ins, we love to hear how things are going and what kind of support I can be for you. Please feel free to reach out to your [facility] team between these check-ins if you need assistance or have any questions.    What to Expect every Six Months: Every six months, it is a requirement that we complete a Health Action Plan (HAP) review. During this appointment, we will monitor your progress towards existing goals and set new goals for the next 6 month time period.    What kinds of support can [facility] offer now that I am enrolled?   - Housing Coordination  - Transportation Resources  - Financial Resources  - Coordination with the [facility] for Benefits (MA, SNAP benefits, etc)  - Disability Eligibility and Benefits  - Employment and Education Coordination  - Disability Related Information and Education Resources  - Referrals for mental health services, chemical dependency assessment/treatment, etc   - Have any other large stressors? Feel free to ask your Behavioral Health Home ([facility]) team for support. We will try and help directly or at least point you in the right direction to a resource that can help.    If you or someone you know is experiencing a mental health crisis and you need help, the following crisis hotlines are  "available to help.    If you are in immediate danger, call 911.  Suicide Prevention Lifeline: 5-065-550-TALK (3759)  Crisis Text Line Service: Text \"[place]\" to 587043.    [facility]  [facility] Emergency Department - Behavioral Emergency Center  [address] [place] [address]  [phone] [facility] - [facility] Crisis Response Services (Adults & Children)  [phone]   [facility] - Acute Psychiatric Services (APS)  Assessment & Referral: [phone]  Suicide Hotline: 291.590.4800 [facility] Team  364.266.4888   [facility] Adult Mental Health Services   [phone]  Referrals: [phone]  Crisis: [phone] [facility] - Emergency Department  [address] [place] [address]  [phone]   [place] [facility]  8-176-QcupOkp (1-752.357.5142) [facility] Mental Health and Resource Crisis Line  [phone]   [facility] - Community Outreach for Psychiatric Emergencies  [phone] [facility] Crisis Services - [facility] Adult Mental Health Services - Crisis Services (24/7)  Information & Referrals: [phone]  Crisis Line: [phone]   [facility] Emergency Center (24/7)  [phone] TDD      Please let me know if you have any questions or if there is anything that we can assist with. I can be reached by phone, [employer] message, or by email. I look forward to continuing to work with you!    Sincerely,          TOMAS Medrano  Behavioral Health [place] ([facility])   [phone]  [email or web address]      "

## 2021-02-02 NOTE — Clinical Note
I also forgot to mention in my last note that since she has indicated suicidal thoughts, can you also complete a safety plan with her tomorrow? I don't see one listed in her chart.    Thank you,    oSndra Christine, [facility]  Behavioral Health Home ([facility])   Supporting the [facility] and [facility]   [phone]

## 2021-02-02 NOTE — Clinical Note
"Hi Sukhdev,    I just completed this patient's Health Action Plan review along with PHQ9, GAD7, and RADHA. When we were completing the PHQ9, she marked a \"1.\" She states that she occasionally may have passive suicidal thoughts by saying \"I just wish it was all over\" but when I asked her if she had any plans or intentions to follow through she said, \"I would never want to do that to my mom, my grandma, or my best friends... there are some things about living that I do like but I just wish I could stop having them (suicidal thoughts).\"    She is aware that I was going to be informing you of this and hopes to speak with you tomorrow about this during your appointment with her.     She also reports an increase in anxiety symptoms recently based around her fear of COVID19 exposure at work. She also said that she has random fears about being kidnapped, identify theft, or losing her health insurance.     "

## 2021-02-02 NOTE — PROGRESS NOTES
Behavioral Health Home Services  [facility]: Jose Enrique        Social Work Care Navigator Note      Patient: Lynn Vaca  Date: February 2, 2021  Preferred Name: Lynn    Previous PHQ-9:   PHQ-9 SCORE 10/24/2013 8/14/2017 6/19/2019   PHQ-9 Total Score 6 - -   PHQ-9 Total Score MyChart - - 8 (Mild depression)   PHQ-9 Total Score - 9 8     Previous PALAK-7:   PALAK-7 SCORE 7/23/2015 8/14/2017 6/19/2019   Total Score 9 - -   Total Score - - 12 (moderate anxiety)   Total Score - 12 12     RADHA LEVEL:  RADHA Score (Last Two) 3/3/2020   RADHA Raw Score 30   Activation Score 56   RADHA Level 3       Preferred Contact:  Need for : No  Preferred Contact: Cell      Type of Contact Today: Phone call (patient / identified key support person reached)      Data: (subjective / Objective):  Recent ED/IP Admission or Discharge?   None    Patient Goals:  Goal Areas: Mental Health;Social and Interpersonal Relationships;Employment / Volunteer  Patient stated goals: Increase social skills; have more friendships; and find a more fulfilling job using her College degree where I majored in History and minored in Rastafari; I would like to overcome my fear of parallel parking.        [facility] Core Service Provided:  Care Coordination: provided care management services/referrals necessary to ensure patient and their identified supports have access to medical, behavioral health, pharmacology and recovery support services.  Ensured that patient's care is integrated across all settings and services.   Health and Wellness Promotion    Current Stressors / Issues / Care Plan Objective Addressed Today:  [facility] called patient for scheduled telephone visit to complete Health Action Plan Review.     [facility] inquired about patient's interest on continued enrollment with Behavioral Health Home ([facility]) services. Patient states it has been very helpful for her, specifically with helping her to find a job and helping to motivate her on her next steps.    [facility]  "reminded patient on requirements of Behavioral Health Home ([facility]) program:  - Patient is required to be on a Medical Assistance insurance plan. Of note, Epic states patient's insurance coverage as \"none\" but patient scanned in her active medical assistance card into the chart as of today under [employer]. Patient confirmed her Medical Assistance plan is still active.  - Patient is required to have a primary care physician within the [facility] system  If either of these were to change, [facility] informed patient to discuss with [facility] team as this may require discharge from program. Patient understood this.  Patient has Dr. Valdez listed as PCP currently. [facility] informed patient that after [facility] closed, she is now at the [place] location. Patient was pleased about this as she wants to continue seeing her as PCP.    PHQ9, GAD7, and RADHA were completed by telephone. See flowsheets for patient responses.  Patient states her anxiety has increased recently. Specifically, she will have mornings where she wakes up and her heart seems to be racing. She states she worries about being kidnapped, having identity stolen, COVID19 exposure, or that she would randomly lose health insurance. She states she manages her anxiety well at this time, even though she experiences it regularly. She is scheduled with Behavioral Health Clinician tomorrow and plans on addressing this with him then.   When her anxiety is rising, patient normally mariano by trying to read, speaking to her mother, or trying to take deep breaths.    Patient reports she also has had some passive suicidal thoughts saying \"I just wish it was all over...\" Referring to suicide, patient said: \"I would never want to do that to my mom, my grandma, or my best friends... there are some things about living that I do like but I just wish I could stop having them (suicidal thoughts).\"  Patient denied ever having a plan or intent to commit suicide. [facility] informed patient " that this worker would notify Behavioral Health Clinician about this prior to their appointment tomorrow so they can address it.    [facility] asked what she typically does when she starts having suicidal thoughts and she reported she normally is able to speak with her mother about it, tries to think about the good things in life such as Comic book conventions (7 per year prior to COVID19), TV shows and movies, sees Grandma multiple times per week. She also really values her best friend who was pen pals with. Her friend currently lives in [place] and they hope to see each other in person after COVID19.    Patient continues wanting to find a new job. She acknowledged that the job search and interviewing for a job is a big stressor for her and she feels intimidated doing so. [facility] discussed how a workforce center can assist in these areas. Patient thanked worker for suggestion as she states she is aware of where the nearest workforce center is to her house ([address] [place] [address]).  [facility] also discussed [facility] services and how they could assist with patient's job search. After discussing, patient states she would prefer to have a referral placed for [facility] Services. She would also like to utilize their services to help keep her organized.    Patient provided verbal consent today to sign a Release of Information in order to place a referral and to coordinate with [facility] and [facility].  [facility] completed Releases of Information and [facility].    Previous Goals:  1. Increase social skills; have more friendships;   - Patient states she has done well with this goal. She has tried to push herself to go out with friends even if she would rather stay at home.  - Patient would like to discontinue this goal.    2. 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 She really enjoys Presidential history. Patient requested an [facility] referral be placed on her behalf to have an [facility] worker assist with her job search and review interviewing skills and updating her resume.    3. I would like to overcome my fear of parallel parking.  - Patient states she wants to get her 's license but is fearful of parallel parking. Would like to get a 's license by the end of 2021.  - Needs to get her permit again as it has lapsed for the third   - Wants to find a [facility] teacher to assist her       New Goals:  1. Patient would like to begin [facility] Services to assist her with organization and her job search. Patient requested an [facility] referral be placed on her behalf by [facility]. 2. Patient would like to find a new job, ideally utilizing her College Degree (History, Minor: [employer]). She will consider utilizing the [employer] and [employer] [facility] to assist with the job search as well as interview skills. 3. Patient would like to obtain her 's license by the end of the year 2021. 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      Patient would like a copy of Health Action Plan sent via Connected.     Scheduled our next month check-in on 3/1/2021 at 10am    [facility] completed [facility] referral online for [facility].    [facility] routed note to Behavioral Health Clinician so he is aware of patient's depression and anxiety symptoms that have increased recently. Also requested that he complete a safety plan with patient to discuss what to do if suicidal thoughts are to arise.    [facility] reviewed different driving schools near [place], [place]. Will send list with Health Action Plan, once approved by Behavioral Health Clinician.  1st Class Driving School  [employer]  [employer]  [employer]  [employer]  [employer]  [employer] [facility] [employer]  [employer]  [employer]  [employer]  [employer]  [employer]  [employer]  [employer] [facility] created Health Action Plan and routed to Behavioral Health Clinician for review and approval.        Addendum 2/3/2021  HAP approved by Behavioral Health Clinician. [facility] adjusted flowsheets to reflect that Behavioral Health Clinician approved Health Action Plan Letter.    [facility] sent Health Action Plan (HAP) along with Patient Health Action Plan Review Letter to patient via edelight, per her request. See letters for additional information.        Intervention:  Motivational Interviewing: Expressed Empathy/Understanding, Supported Autonomy, Collaboration, Evocation, Permission to raise concern or advise and Open-ended questions   Target Behavior(s): Explored thoughts and readiness to participate in individual therapy, Explored and resolved challenges to attending appointments as scheduled and Explored current social supports and reinforced opportunities to increase engagement    Assessment: (Progress on Goals / Homework):  Patient would benefit from continued coordination in reaching their goals set for the Behavioral Health Home ([facility]) program. [facility] reviewed Health Action Plan goals and will continue to monitor progress and work with patient and their care team.      Plan: (Homework, other):  Patient was encouraged to continue to seek condition-related information and education.      Scheduled a Phone follow up appointment with HOLLAND  in 4 weeks     Patient has set self-identified goals and will monitor progress until the next appointment on: 3/1/2021.         TOMAS Medrano  Behavioral Health Home ([facility])   Supporting the [facility] and [facility]  [phone]

## 2021-02-02 NOTE — LETTER
Behavioral Health Home ([facility]): Health Action Plan  [facility]: Jose Enrique    [facility]      Name: Lynn Vaca  Preferred Name: Lynn  : 1995  MRN: [id number]      My Goals  Goal Areas: Social and Interpersonal Relationships;Employment / Volunteer;Transportation    Patient stated goals:   1. Patient would like to begin [facility] Services to assist her with organization and her job search. Patient requested an [facility] referral be placed on her behalf by [facility].   2. Patient would like to find a new job, ideally utilizing her College Degree (History, Minor: Tenriism). She will consider utilizing the Workforce Center and [facility] Services to assist with the job search as well as interview skills.   3. Patient would like to obtain her 's license by the end of the year . 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      Strengths related to each goal: Patient describes herself as Perceptive; her boss describes her as dependable.    Services and Supports Needed: The [facility] Team will provide monthly contact with patient to monitor progress towards goals.    Activities / Actions of Team to support goal(s): The [facility] Team will locate appropriate resources that align with patient's goals and promote health and wellness.    Activities / Actions of Patient / Parent / Guardian to support goal(s): It is a requirement that patient's primary care physician is through the [facility] system and that they are on Medical Assistance/Medicaid. If either of these were to change, they begin receiving Targeted/Mental Health Case Management, or if patient needs any type of assistance, they are to reach out to their Behavioral Health Home ([facility]) team.        Recommended Referral  Tobacco cessation referrals made?: NA  Mental Health / Chemical Dependency Referrals: Yes  Substance Use Referrals: Not  "Applicable  Mental Health Referrals: MH Services: [facility], [facility], Community MH Provider (Patient is currently seeing [facility]. Behavioral Health Clinician placed a referral for psychiatry services on 2/3/2021.)  Community Health Referrals: [facility] Financial Services;[facility]       My Team Members and Their Contact Information  Patient Care Team       Relationship Specialty Notifications Start End    Zulema Valdez MD PCP - General Family Practice  9/10/13     Phone: [phone] Fax: [phone]         [address] [facility] [address]    Zulema Valdez MD Assigned PCP   8/19/18     Phone: [phone] Fax: [phone]         [facility] [address] [facility] [address]    Dav [facility]   - Clinical Admissions 10/10/19     Phone: [phone] Fax: [phone]         [address] [place] [address]    [facility]  Dentist  10/10/19     Phone: [phone]         Sondra Christine LSW  Clinic Admissions 9/9/20     Behavioral Health Home ([facility]) Services - [facility]. Phone: [phone]          My Wellness Plan  Safety Concerns: Suicide Ideation (Patient also has a history of self-injurious behavior.)    Recommendations / Plan for safety concerns: Patient signed and completed a safety plan with [facility] - Sukhdev Davis on 2/3/2021. Safety plan, including numbers for patient to utilize if a crisis were to arise, were sent to patient on 2/3/2021.    If you are in immediate danger, call 911.  If you or someone you know is experiencing a mental health crisis and you need help, the following crisis  hotlines are available to help.    Suicide Prevention Lifeline: 5-832-601-DWSO (7095)  Crisis Text Line Service: Text \"[place]\" to 632719.    [facility] -  Acute Psychiatric Services (APS)  Assessment & Referral: [phone]  Suicide Hotline: 454.140.7675    [facility] " [facility] for Psychiatric Emergencies  [phone]    Crisis Plan (emergencies / when urgent support needed): Patient states if an emergency were to occur, she would feel comfortable reaching out to her mother, or was recommended to call [place] or 1.      Lynn Vaca co-developed the Health Action Plan with the [facility] Team and received a copy of this document.  Date Health Action Plan Completed/Updated: 02/02/21

## 2021-02-03 ENCOUNTER — VIRTUAL VISIT (OUTPATIENT)
Dept: BEHAVIORAL HEALTH | Facility: CLINIC | Age: 26
End: 2021-02-03
Payer: COMMERCIAL

## 2021-02-03 DIAGNOSIS — F84.5 ASPERGER'S DISORDER: Primary | ICD-10-CM

## 2021-02-03 PROCEDURE — 90834 PSYTX W PT 45 MINUTES: CPT | Mod: 95 | Performed by: SOCIAL WORKER

## 2021-02-03 ASSESSMENT — ANXIETY QUESTIONNAIRES: GAD7 TOTAL SCORE: 14
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
## 2021-02-05 ENCOUNTER — TELEPHONE (OUTPATIENT)
Dept: BEHAVIORAL HEALTH | Facility: CLINIC | Age: 26
End: 2021-02-05

## 2021-02-05 NOTE — TELEPHONE ENCOUNTER
----- Message from Jeannine Peterson sent at 2/4/2021  4:49 PM CST -----  We did not reach Lynn Vaca, we left a message with our contact number [phone].  If we do not hear from them within the next 30 business days we will mail a letter offering our scheduling services.    If they do call to schedule, in the future, we will inform you of the outcome.    Thank you for your referral,  [facility]

## 2021-02-09 ENCOUNTER — VIRTUAL VISIT (OUTPATIENT)
Dept: BEHAVIORAL HEALTH | Facility: CLINIC | Age: 26
End: 2021-02-09
Payer: COMMERCIAL

## 2021-02-09 DIAGNOSIS — F84.5 ASPERGER'S DISORDER: Primary | ICD-10-CM

## 2021-02-09 PROCEDURE — 90834 PSYTX W PT 45 MINUTES: CPT | Mod: 95 | Performed by: SOCIAL WORKER
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
## 2021-02-10 ENCOUNTER — TELEPHONE (OUTPATIENT)
Dept: BEHAVIORAL HEALTH | Facility: CLINIC | Age: 26
End: 2021-02-10

## 2021-02-10 NOTE — TELEPHONE ENCOUNTER
----- Message from Olegario Zamora sent at 2/9/2021  3:03 PM CST -----  Regarding: Referral Outcome      Lynn Vaca is scheduled for a Psychiatry initial appointment appointment on 2/16/2021 with an external provider.    Appointment Date: 2/16/2021  Appointment Time: 10:30 AM  Location: [facility]  Kirill Lancaster CNP,PMHNP,RN  [address] [place], [place] [id number] [phone]    Thank you for your referral,  [facility]

## 2021-02-23 ENCOUNTER — VIRTUAL VISIT (OUTPATIENT)
Dept: BEHAVIORAL HEALTH | Facility: CLINIC | Age: 26
End: 2021-02-23
Payer: COMMERCIAL

## 2021-02-23 DIAGNOSIS — F84.5 ASPERGER'S DISORDER: Primary | ICD-10-CM

## 2021-02-23 PROCEDURE — 90834 PSYTX W PT 45 MINUTES: CPT | Mod: 95 | Performed by: SOCIAL WORKER
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
## 2021-03-02 ENCOUNTER — ALLIED HEALTH/NURSE VISIT (OUTPATIENT)
Dept: BEHAVIORAL HEALTH | Facility: CLINIC | Age: 26
End: 2021-03-02
Payer: COMMERCIAL

## 2021-03-02 DIAGNOSIS — R69 DIAGNOSIS DEFERRED: Primary | ICD-10-CM

## 2021-03-02 NOTE — PROGRESS NOTES
Behavioral Health Home Services  [facility]: Jose Enrique        Social Work Care Navigator Note      Patient: Lynn Vaca  Date: March 2, 2021  Preferred Name: Lynn    Previous PHQ-9:   PHQ-9 SCORE 8/14/2017 6/19/2019 2/2/2021   PHQ-9 Total Score - - -   PHQ-9 Total Score MyChart - 8 (Mild depression) -   PHQ-9 Total Score 9 8 9     Previous PALAK-7:   PALAK-7 SCORE 8/14/2017 6/19/2019 2/2/2021   Total Score - - -   Total Score - 12 (moderate anxiety) -   Total Score 12 12 14     RADHA LEVEL:  RADHA Score (Last Two) 3/3/2020 2/2/2021   RADHA Raw Score 30 40   Activation Score 56 100   RADHA Level 3 4       Preferred Contact:  Need for : No  Preferred Contact: Cell      Type of Contact Today: Phone call (patient / identified key support person reached)      Data: (subjective / Objective):  Recent ED/IP Admission or Discharge?   None    Patient Goals:  Goal Areas: Social and Interpersonal Relationships;Employment / Volunteer;Transportation  Patient stated goals: 1. Patient would like to begin [facility] to assist her with organization and her job search. Patient requested an Community Health referral be placed on her behalf by [facility]. 2. Patient would like to find a new job, ideally utilizing her College Degree (History, Minor: Spiritism). She will consider utilizing the [employer] and [facility] to assist with the job search as well as interview skills. 3. Patient would like to obtain her 's license by the end of the year 2021. 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          [facility] Core Service Provided:  Care Coordination: provided care management services/referrals necessary to ensure patient and their identified supports have access to medical, behavioral health, pharmacology and recovery support services.  Ensured that patient's care is integrated across all settings and services.   Health  and Wellness Promotion    Current Stressors / Issues / Care Plan Objective Addressed Today:  [facility] called patient for scheduled check-in.    [facility] checked in with patient regarding her new psychiatrist and the new medication she started that had some adverse side effects. Patient states she stilll needs to speak with psychiatrist about these side effects (Headache, made her heart race, trouble breathing) and she does not currently have an appointment scheduled. [facility] encouraged patient to schedule an appointment with psychiatrist in the near future. Behavioral Health Clinician will most likely check in on this as well during their scheduled appointment on 3/9. Patient will do so.    [facility] inquired if [facility] had called her to schedule the intake for [facility] services, which she denied. [facility] to check in with [facility].    Patient reports her Anxiety has improved slightly recently, reason unknown. Patient states her anxiety affects her by becoming Irritable, agitation, over-thinking, paranoid that things could go wrong.    Still needs to renew her permit. She knows her first step is to go into the DMV to take the test again and patient reported readiness to take the test at this time. Most likely will go to the DMV on [facility] by [employer]. We made it a goal for patient to have scheduled or to have taken her permit test by the next check-in.  List of driving schools to be sent by this worker,    Patient reports work has been going well. She has had to open the last three days so she got to work at 5am, which was difficult but only had to work 3 hour shifts.     Patient denied needing additional assistance. Scheduled our next check-in on 4/6 at 10:30am.      [facility] called [facility] ([phone]) and spoke to Nazia. Online referral was completed for [facility] services by this worker on 2/2/2021. She states the referral must not have gone through as they don't have it on  their records. [facility] was transferred to registration where this worker completed a referral over the phone with Yanira.    Intervention:  Motivational Interviewing: Expressed Empathy/Understanding, Supported Autonomy, Collaboration, Evocation, Permission to raise concern or advise and Open-ended questions   Target Behavior(s): Explored thoughts about taking an anti-depressant, Explored thoughts and readiness to participate in individual therapy and Explored current social supports and reinforced opportunities to increase engagement    Assessment: (Progress on Goals / Homework):  Patient would benefit from continued coordination in reaching their goals set for the Behavioral Health Home ([facility]) program. [facility] reviewed Health Action Plan goals and will continue to monitor progress and work with patient and their care team.      Plan: (Homework, other):  Patient was encouraged to continue to seek condition-related information and education.      Scheduled a Phone follow up appointment with HOLLAND CHAU in 4 weeks     Patient has set self-identified goals and will monitor progress until the next appointment on: 4/6/2021.         TOMAS Medrano  [facility] ([facility])   Supporting the [facility] and [facility]  [phone]

## 2021-03-08 ENCOUNTER — TELEPHONE (OUTPATIENT)
Dept: BEHAVIORAL HEALTH | Facility: CLINIC | Age: 26
End: 2021-03-08

## 2021-03-08 NOTE — TELEPHONE ENCOUNTER
Behavioral Health Home Services  [facility]: [place]        Social Work Care Navigator Note      Patient: Lynn Vaca  Date: March 8, 2021  Preferred Name: Lynn    Previous PHQ-9:   PHQ-9 SCORE 8/14/2017 6/19/2019 2/2/2021   PHQ-9 Total Score - - -   PHQ-9 Total Score MyChart - 8 (Mild depression) -   PHQ-9 Total Score 9 8 9     Previous PALAK-7:   PALAK-7 SCORE 8/14/2017 6/19/2019 2/2/2021   Total Score - - -   Total Score - 12 (moderate anxiety) -   Total Score 12 12 14     RADHA LEVEL:  RADHA Score (Last Two) 3/3/2020 2/2/2021   RADHA Raw Score 30 40   Activation Score 56 100   RADHA Level 3 4       Preferred Contact:  Need for : No  Preferred Contact: Cell      Type of Contact Today: Phone call (not reached/unavailable)      Data: (subjective / Objective):  Message received from [employer] [facility] [employer] ([phone]) stating they spoke with patient and scheduled intake for [facility] Services next Tuesday, 3/16. They requested a call back if any additional questions.    TOMAS Medrano  Behavioral [facility] ([facility])   Supporting the [facility] and [facility]  [phone]          Next 5 appointments (look out 90 days)    Mar 23, 2021 11:00 AM  SHORT with Zulema Valdez MD  [facility] ([facility] ) [address] [place] [id number] [phone]

## 2021-03-09 ENCOUNTER — TELEPHONE (OUTPATIENT)
Dept: BEHAVIORAL HEALTH | Facility: CLINIC | Age: 26
End: 2021-03-09

## 2021-03-15 ENCOUNTER — DOCUMENTATION ONLY (OUTPATIENT)
Dept: BEHAVIORAL HEALTH | Facility: CLINIC | Age: 26
End: 2021-03-15

## 2021-03-15 NOTE — PROGRESS NOTES
Documentation Only     Updated Behavioral Health Home enrollment and brief needs flowsheet to reflect updated Behavioral Health Home clinic name from [place] to [facility].    TOMAS Sadler  Behavioral Health Home Supervisor   [facility]

## 2021-03-23 ENCOUNTER — OFFICE VISIT (OUTPATIENT)
Dept: FAMILY MEDICINE | Facility: CLINIC | Age: 26
End: 2021-03-23
Payer: COMMERCIAL

## 2021-03-23 VITALS
DIASTOLIC BLOOD PRESSURE: 76 MMHG | OXYGEN SATURATION: 99 % | HEART RATE: 66 BPM | SYSTOLIC BLOOD PRESSURE: 112 MMHG | TEMPERATURE: 98.4 F | WEIGHT: 137 LBS | BODY MASS INDEX: 25.21 KG/M2 | HEIGHT: 62 IN | RESPIRATION RATE: 16 BRPM

## 2021-03-23 DIAGNOSIS — R07.89 CHEST WALL PAIN: Primary | ICD-10-CM

## 2021-03-23 DIAGNOSIS — R68.84 JAW PAIN: ICD-10-CM

## 2021-03-23 DIAGNOSIS — L85.8 KERATOSIS PILARIS: ICD-10-CM

## 2021-03-23 PROCEDURE — 99213 OFFICE O/P EST LOW 20 MIN: CPT | Performed by: FAMILY MEDICINE

## 2021-03-23 ASSESSMENT — ANXIETY QUESTIONNAIRES
7. FEELING AFRAID AS IF SOMETHING AWFUL MIGHT HAPPEN: NEARLY EVERY DAY
GAD7 TOTAL SCORE: 16
IF YOU CHECKED OFF ANY PROBLEMS ON THIS QUESTIONNAIRE, HOW DIFFICULT HAVE THESE PROBLEMS MADE IT FOR YOU TO DO YOUR WORK, TAKE CARE OF THINGS AT HOME, OR GET ALONG WITH OTHER PEOPLE: SOMEWHAT DIFFICULT
1. FEELING NERVOUS, ANXIOUS, OR ON EDGE: NEARLY EVERY DAY
2. NOT BEING ABLE TO STOP OR CONTROL WORRYING: NEARLY EVERY DAY
3. WORRYING TOO MUCH ABOUT DIFFERENT THINGS: NEARLY EVERY DAY
6. BECOMING EASILY ANNOYED OR IRRITABLE: NEARLY EVERY DAY
5. BEING SO RESTLESS THAT IT IS HARD TO SIT STILL: NOT AT ALL

## 2021-03-23 ASSESSMENT — MIFFLIN-ST. JEOR: SCORE: 1309.68

## 2021-03-23 ASSESSMENT — PATIENT HEALTH QUESTIONNAIRE - PHQ9
SUM OF ALL RESPONSES TO PHQ QUESTIONS 1-9: 12
5. POOR APPETITE OR OVEREATING: SEVERAL DAYS

## 2021-03-23 NOTE — PATIENT INSTRUCTIONS
If you have not already received your COVID vaccination, please register on the [place] [employer] website so you will be notified when it is your turn to receive the vaccine.  You can register at: [email or web address]    Did you know?   I do telehealth (video) visits exclusively on Wednesdays.  I still do in-person visits at [facility] ([phone]) on Mondays, Tuesdays and Thursdays.  You can schedule a video visit for follow-up appointments as well as future appointments for certain conditions.  Please see the below link.  [email or web address]      For your arms use a moisturizing lotion like Amlactin.    Try icing your chest wall periodically.      Work with your dentist on a mouth guard.

## 2021-03-23 NOTE — PROGRESS NOTES
Assessment & Plan     Chest wall pain  Very atypical chest pain that is reproducible with palpation.  Patient reassured.  She may try ice and will monitor symptoms.      Jaw pain  Reassured patient that I am not finding any pathology on exam today and I suspect her dentist is correct regarding suspicion of bruxism.  I encouraged her to try a dental appliance.      Keratosis pilaris  Recommended AmLactin lotion.        Patient instructed to contact clinic if symptoms persist, worsen, or do not resolve as anticipated.      No follow-ups on file.    Zulema Valdez MD  [facility]        Lucas Bailey is a 25 year old who presents for the following health issues     HPI     Chest Pain  Onset/Duration: x 4weeks  Description:   Location: left side  Character: achey  Radiation: down left arm  Duration: 2 minutes, waxing and waning and intermittent   Intensity: 3/10  Progression of Symptoms: same  Accompanying Signs & Symptoms:  Shortness of breath: no  Sweating: no  Nausea/vomiting: no  Lightheadedness: no  Palpitations: YES but not at same time as pain  Fever/Chills: no  Cough: no           Heartburn: no  History:   Family history of heart disease: no  Tobacco use: no  Previous similar symptoms: no   Precipitating factors:   Worse with exertion: YES  Worse with deep breaths: no           Related to eating: no           Better with burping: no    Therapies tried and outcome: none    Nonexertional  Worse with anxiety  No associated symptoms     Also right jaw line pain that radiates into right clavicle.  Intermittent - lasting up to 3 minutes.  Saw dentist last week.  He suspects bruxism.      Rash  Onset/Duration: 4 weeks   Description  Location: Bilateral arms   Character: blotchy, raised, burning, red, itching  Itching: mild  Intensity:  moderate  Progression of Symptoms:  same  Accompanying signs and symptoms:   Fever: no  Body aches or joint pain: no  Sore throat symptoms:  "no  Recent cold symptoms: no  History:           Previous episodes of similar rash: None  New exposures:  medication for anxiety but discontinued due to side effects  Recent travel: no  Exposure to similar rash: no  Precipitating or alleviating factors: none  Therapies tried and outcome: lotion        Review of Systems   as above       Objective    /76   Pulse 66   Temp 98.4  F (36.9  C) (Tympanic)   Resp 16   Ht 1.575 m (5' 2\")   Wt 62.1 kg (137 lb)   SpO2 99%   Breastfeeding No   BMI 25.06 kg/m    Body mass index is 25.06 kg/m .  Physical Exam   GEN:  no apparent distress  HEAD/FACE: normocephalic/atruamatic  EYES: sclerae and conjunctivae clear with no discharge  ENT: external ears and nose without lesions or scars and TM's and canals clear bilaterally  NECK:  Supple without adenopathy, mass, or thyromegaly  LUNGS:  normal respiratory effort, and lungs clear to auscultation bilaterally - no rales, rhonchi or wheezes  CV: regular rate and rhythm, normal S1 S2, no S3 or S4 and no murmur, click or rub  CHEST:  There is tenderness to palpation over left upper chest wall that reproduces her pain  SKIN: xerosis with keratosis pilaris of arms        "

## 2021-03-24 ENCOUNTER — VIRTUAL VISIT (OUTPATIENT)
Dept: BEHAVIORAL HEALTH | Facility: CLINIC | Age: 26
End: 2021-03-24
Payer: COMMERCIAL

## 2021-03-24 DIAGNOSIS — F84.5 ASPERGER'S DISORDER: Primary | ICD-10-CM

## 2021-03-24 PROCEDURE — 90832 PSYTX W PT 30 MINUTES: CPT | Mod: 95 | Performed by: SOCIAL WORKER

## 2021-03-24 ASSESSMENT — ANXIETY QUESTIONNAIRES: GAD7 TOTAL SCORE: 16
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
## 2021-04-06 ENCOUNTER — ALLIED HEALTH/NURSE VISIT (OUTPATIENT)
Dept: BEHAVIORAL HEALTH | Facility: CLINIC | Age: 26
End: 2021-04-06
Payer: COMMERCIAL

## 2021-04-06 DIAGNOSIS — R69 DIAGNOSIS DEFERRED: Primary | ICD-10-CM

## 2021-04-06 NOTE — PROGRESS NOTES
Behavioral Health Home Services  [facility]: [place]        Social Work Care Navigator Note      Patient: Lynn Vaca  Date: April 6, 2021  Preferred Name: Lynn    Previous PHQ-9:   PHQ-9 SCORE 6/19/2019 2/2/2021 3/23/2021   PHQ-9 Total Score - - -   PHQ-9 Total Score MyChart 8 (Mild depression) - -   PHQ-9 Total Score 8 9 12     Previous PALAK-7:   PALAK-7 SCORE 6/19/2019 2/2/2021 3/23/2021   Total Score - - -   Total Score 12 (moderate anxiety) - -   Total Score 12 14 16     RADHA LEVEL:  RADHA Score (Last Two) 3/3/2020 2/2/2021   RADHA Raw Score 30 40   Activation Score 56 100   RADHA Level 3 4       Preferred Contact:  Need for : No  Preferred Contact: Cell      Type of Contact Today: Phone call (patient / identified key support person reached)      Data: (subjective / Objective):  Recent ED/IP Admission or Discharge?   None    Patient Goals:  Goal Areas: Social and Interpersonal Relationships;Employment / Volunteer;Transportation  Patient stated goals: 1. Patient would like to begin [facility] to assist her with organization and her job search. Patient requested an [employer] referral be placed on her behalf by [facility]. 2. Patient would like to find a new job, ideally utilizing her College Degree (History, Minor: Yazdanism). She will consider utilizing the [employer]force Center and [employer] [facility] to assist with the job search as well as interview skills. 3. Patient would like to obtain her 's license by the end of the year 2021. 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          [facility] Core Service Provided:  Care Coordination: provided care management services/referrals necessary to ensure patient and their identified supports have access to medical, behavioral health, pharmacology and recovery support services.  Ensured that patient's care is integrated across all settings and services.    Health and Wellness Promotion  Referral to Community and Social Support Services: Followed-up with patient on whether or not they completed a referral    Current Stressors / Issues / Care Plan Objective Addressed Today:  [facility] called patient for scheduled check-in.    She was able to call and schedule her permit test at [place] [employer]. It is scheduled some time in May as there weren't any appointments sooner.     CC inquired about the side-effects she had been experiencing Stopped taking medication a few weeks ago so she hasn't been been feeling any side-effects    Had intake with [facility] for [facility] services and is waiting on being assigned a staff member. Patient reports she was not given a time frame of when this will occur. She states she is excited about finding a new job and working on getting 's license after getting her permit.    Depression and anxiety have increased recently. Patient states she thinks it is due to feelings of isolation due to COVID19 and wanting to get out of her her current job. Patient denied suicidal thoughts, plans, and intentions today.    Since patient is not currently assigned an [facility] worker, [facility] suggested patient start on the job search on her own. She may consider reaching out to a Workforce Center to receive guidance on writing a resume, interview skills, and assistance with her job search. Patient was willing to receive information on Workforce Centers that are nearby and requested this over [employer].    Patient denied needing additional assistance.  Scheduled our next check-in on 5/4/21 at 10:30am.      [facility] sent patient a message via [employer] with different Workforce centers as well as additional employment assistance agencies.    Intervention:  Motivational Interviewing: Expressed Empathy/Understanding, Supported Autonomy, Collaboration, Evocation, Permission to raise concern or advise and Open-ended questions   Target Behavior(s): Explored  thoughts and readiness to participate in individual therapy, Explored and resolved challenges to attending appointments as scheduled and Explored current social supports and reinforced opportunities to increase engagement    Assessment: (Progress on Goals / Homework):  Patient would benefit from continued coordination in reaching their goals set for the Behavioral Health Home ([facility]) program. [facility] reviewed Health Action Plan goals and will continue to monitor progress and work with patient and their care team.      Plan: (Homework, other):  Patient was encouraged to continue to seek condition-related information and education.      Scheduled a Phone follow up appointment with HOLLAND CHAU in 4 weeks     Patient has set self-identified goals and will monitor progress until the next appointment on: 5/4/2021.         TOMAS Medrano  Behavioral Health Home ([facility])   Supporting the [facility] and [facility]  [phone]

## 2021-04-14 ENCOUNTER — VIRTUAL VISIT (OUTPATIENT)
Dept: BEHAVIORAL HEALTH | Facility: CLINIC | Age: 26
End: 2021-04-14
Payer: COMMERCIAL

## 2021-04-14 DIAGNOSIS — F84.5 ASPERGER'S DISORDER: Primary | ICD-10-CM

## 2021-04-14 PROCEDURE — 90832 PSYTX W PT 30 MINUTES: CPT | Mod: 95 | Performed by: SOCIAL WORKER
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
## 2021-04-28 ENCOUNTER — VIRTUAL VISIT (OUTPATIENT)
Dept: BEHAVIORAL HEALTH | Facility: CLINIC | Age: 26
End: 2021-04-28
Payer: COMMERCIAL

## 2021-04-28 DIAGNOSIS — F84.5 ASPERGER'S DISORDER: Primary | ICD-10-CM

## 2021-04-28 PROCEDURE — 90832 PSYTX W PT 30 MINUTES: CPT | Mod: 95 | Performed by: SOCIAL WORKER
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
## 2021-05-04 ENCOUNTER — ALLIED HEALTH/NURSE VISIT (OUTPATIENT)
Dept: BEHAVIORAL HEALTH | Facility: CLINIC | Age: 26
End: 2021-05-04
Payer: COMMERCIAL

## 2021-05-04 ENCOUNTER — TELEPHONE (OUTPATIENT)
Dept: BEHAVIORAL HEALTH | Facility: CLINIC | Age: 26
End: 2021-05-04

## 2021-05-04 DIAGNOSIS — R69 DIAGNOSIS DEFERRED: Primary | ICD-10-CM

## 2021-05-04 NOTE — PROGRESS NOTES
Behavioral Health Home Services  [facility]: [place]        Social Work Care Navigator Note      Patient: Lynn Vaca  Date: May 4, 2021  Preferred Name: Lynn    Previous PHQ-9:   PHQ-9 SCORE 6/19/2019 2/2/2021 3/23/2021   PHQ-9 Total Score - - -   PHQ-9 Total Score MyChart 8 (Mild depression) - -   PHQ-9 Total Score 8 9 12     Previous PALAK-7:   PALAK-7 SCORE 6/19/2019 2/2/2021 3/23/2021   Total Score - - -   Total Score 12 (moderate anxiety) - -   Total Score 12 14 16     RADHA LEVEL:  RADHA Score (Last Two) 3/3/2020 2/2/2021   RADHA Raw Score 30 40   Activation Score 56 100   RADHA Level 3 4       Preferred Contact:  Need for : No  Preferred Contact: Cell      Type of Contact Today: Phone call (patient / identified key support person reached)      Data: (subjective / Objective):  Recent ED/IP Admission or Discharge?   None    Patient Goals:  Goal Areas: Social and Interpersonal Relationships;Employment / Volunteer;Transportation  Patient stated goals: 1. Patient would like to begin [facility] Services to assist her with organization and her job search. Patient requested an [facility] referral be placed on her behalf by [facility]. 2. Patient would like to find a new job, ideally utilizing her College Degree (History, Minor: [employer]). She will consider utilizing the Workforce Center and [facility] to assist with the job search as well as interview skills. 3. Patient would like to obtain her 's license by the end of the year 2021. 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          [facility] Core Service Provided:  Care Coordination: provided care management services/referrals necessary to ensure patient and their identified supports have access to medical, behavioral health, pharmacology and recovery support services.  Ensured that patient's care is integrated across all settings and services.  "  Health and Wellness Promotion  Referral to Community and Social Support Services: Followed-up with patient on whether or not they completed a referral    Current Stressors / Issues / Care Plan Objective Addressed Today:  [facility] called patient for scheduled check-in.    Patient reports her depression and anxiety still has been increased recently. She has explored with this Behavioral Health Clinician - Sukhdev Davis and also met with her psychiatrist - Kirill Lancaster today. She was prescribed a new medication today, though patient did not remember which medication at this time.  When he depression and anxiety increases, she oftentimes feels it is because she is \"overthinking stuff.\" Behavioral Health Clinician has helped patient to reflect on \"the good outcomes\" that can come from different opportunities. She still is very interested in finding new employment.  [facility] inquired about patient reviewing the message this worker sent her over [employer] last month that included different Workforce Centers and employment agencies. Patient states she looked at the message but \"chickened out\" on calling them. [facility] reviewed again with patient the benefits of working with these agencies but acknowledged that finding a new job can be intimidating at times.    Patient has not created an account for the [employer] program yet through their website in order to view the job openings. After much prompting, [facility] determined that patient wants some support in navigating this website after an account would be created. [facility] offered to do a video visit for our next check-in with the ability to share this worker's screen to go through this website together. Patient will create an account prior to our next appointment.    [facility] inquired about [employer] had assigned an Pager worker yet, which they hadn't. Patient is still unclear on the time frame to expect. She has not reached out to [employer] to check in on this. [facility] offered " to call [employer] to check on status. [facility] will send a message via [employer] to patient with updates.    Permit/Driving test - Is scheduled at the end of the month. Due to her Permit lapsing twice, she needs to retake the written test again. [facility] asked if patient needs assistance with this and patient denied this. She feels comfortable scheduling on her own.    Patient denied needing additional assistance. Scheduled a video visit on 6/8 at 10:30am.        [facility] called [facility] ([phone]). Joselyn Ceja was the Supervisor who completed the intake with patient. [facility] was recommended to call her to get an update on when patient would be assigned an [facility] worker: [phone].  [facility] called Joselyn and left a message requesting a call back with an update on when patient should expect starting [facility] services.      [facility] sent a message to patient updating her on the message left for Joselyn.      Intervention:  Motivational Interviewing: Expressed Empathy/Understanding, Supported Autonomy, Collaboration, Evocation, Permission to raise concern or advise and Open-ended questions   Target Behavior(s): Explored thoughts about taking an anti-depressant, Explored thoughts and readiness to participate in individual therapy, Explored and resolved challenges to attending appointments as scheduled and Explored current social supports and reinforced opportunities to increase engagement    Assessment: (Progress on Goals / Homework):  Patient would benefit from continued coordination in reaching their goals set for the Behavioral Health Home ([facility]) program. [facility] reviewed Health Action Plan goals and will continue to monitor progress and work with patient and their care team.      Plan: (Homework, other):  Patient was encouraged to continue to seek condition-related information and education.      Patient is to sign up and create an account through the [employer] program before our next appointment.    Scheduled a Video  Visit follow up appointment with HOLLAND CHAU in 4 weeks     Patient has set self-identified goals and will monitor progress until the next appointment on: 6/8/2021.         TOMAS Medrano  Behavioral Health Home ([facility])   Supporting the [facility] and [facility]  [phone]

## 2021-05-04 NOTE — TELEPHONE ENCOUNTER
Behavioral Health Home Services  [facility]: [place]        Social Work Care Navigator Note      Patient: Lynn Vaca  Date: May 4, 2021  Preferred Name: Lynn    Previous PHQ-9:   PHQ-9 SCORE 6/19/2019 2/2/2021 3/23/2021   PHQ-9 Total Score - - -   PHQ-9 Total Score MyChart 8 (Mild depression) - -   PHQ-9 Total Score 8 9 12     Previous PALAK-7:   PALAK-7 SCORE 6/19/2019 2/2/2021 3/23/2021   Total Score - - -   Total Score 12 (moderate anxiety) - -   Total Score 12 14 16     RADHA LEVEL:  RADHA Score (Last Two) 3/3/2020 2/2/2021   RADHA Raw Score 30 40   Activation Score 56 100   RADHA Level 3 4       Preferred Contact:  Need for : No  Preferred Contact: Cell      Type of Contact Today: Phone call (not reached/unavailable)      Data: (subjective / Objective):  Call back from Joselyn at [facility]. She states that Letty Meadows is patient's [facility] worker and has called patient two separate times: 4/19 and 4/26. Message was left on 4/19 with Letty's contact information. Psychiatric confirmed they had the correct phone number for patient.   Psychiatric will send a message to patient informing her of Letty's contact information. Psychiatric thanked Joselyn for returning this worker's call.    Letty Meadows: [phone]      Psychiatric added Letty to care team.    Sondra Christine, [facility]  Behavioral Health [place] ([facility])   Supporting the [facility] and [facility]  [phone]

## 2021-05-12 ENCOUNTER — VIRTUAL VISIT (OUTPATIENT)
Dept: BEHAVIORAL HEALTH | Facility: CLINIC | Age: 26
End: 2021-05-12
Payer: COMMERCIAL

## 2021-05-12 DIAGNOSIS — F84.5 ASPERGER'S DISORDER: Primary | ICD-10-CM

## 2021-05-12 PROCEDURE — 90832 PSYTX W PT 30 MINUTES: CPT | Mod: 95 | Performed by: SOCIAL WORKER
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
## 2021-06-03 ENCOUNTER — TELEPHONE (OUTPATIENT)
Dept: BEHAVIORAL HEALTH | Facility: CLINIC | Age: 26
End: 2021-06-03

## 2021-06-03 NOTE — TELEPHONE ENCOUNTER
Behavioral Health Home Services  [facility]        Social Work Care Navigator Note      Patient: Lynn Vaca  Date: Jasmin 3, 2021  Preferred Name: Lynn    Previous PHQ-9:   PHQ-9 SCORE 6/19/2019 2/2/2021 3/23/2021   PHQ-9 Total Score - - -   PHQ-9 Total Score MyChart 8 (Mild depression) - -   PHQ-9 Total Score 8 9 12     Previous PALAK-7:   PALAK-7 SCORE 6/19/2019 2/2/2021 3/23/2021   Total Score - - -   Total Score 12 (moderate anxiety) - -   Total Score 12 14 16     RADHA LEVEL:  RADHA Score (Last Two) 3/3/2020 2/2/2021   RADHA Raw Score 30 40   Activation Score 56 100   RADHA Level 3 4       Preferred Contact:  Need for : No  Preferred Contact: Cell      Type of Contact Today: Phone call (not reached/unavailable)      Data: (subjective / Objective):  After receiving patient's message via Douj[employer] stating she had been unable to make contact with her assigned [facility] worker, Letty Meadows, [facility] called Joselyn Ceja (Supervisor at [employer]: [phone]). Joselyn will get in touch with Letty and have someone reach out to patient.     [facility] sent patient a message in response via Doujiao.    Sondra Christine, [facility]  Behavioral [facility])   Supporting the [facility] and [facility]  [phone]

## 2021-06-08 ENCOUNTER — ALLIED HEALTH/NURSE VISIT (OUTPATIENT)
Dept: BEHAVIORAL HEALTH | Facility: CLINIC | Age: 26
End: 2021-06-08
Payer: COMMERCIAL

## 2021-06-08 DIAGNOSIS — R69 DIAGNOSIS DEFERRED: Primary | ICD-10-CM

## 2021-06-08 NOTE — PROGRESS NOTES
Telemedicine Visit: The patient's condition can be safely assessed and treated via synchronous audio and visual telemedicine encounter.      Reason for Telemedicine Visit: Services only offered telehealth due to COVID19    Originating Site (Patient Location): Patient's home    Distant Site (Provider Location): Provider Remote Setting    Consent:  The patient/guardian has verbally consented to: the potential risks and benefits of telemedicine (video visit) versus in person care.    Mode of Communication:  Video Conference via [employer]    Telemedicine Visit Start Time: 10:31am.     Telemedicine Visit End Time: 10:48am    As the provider I attest to compliance with applicable laws and regulations related to telemedicine.      Behavioral Health Home Services  [facility]: [place]        Social Work Care Navigator Note      Patient: Lynn Vaca  Date: June 8, 2021  Preferred Name: Lynn    Previous PHQ-9:   PHQ-9 SCORE 6/19/2019 2/2/2021 3/23/2021   PHQ-9 Total Score - - -   PHQ-9 Total Score MyChart 8 (Mild depression) - -   PHQ-9 Total Score 8 9 12     Previous PALAK-7:   PALAK-7 SCORE 6/19/2019 2/2/2021 3/23/2021   Total Score - - -   Total Score 12 (moderate anxiety) - -   Total Score 12 14 16     RADHA LEVEL:  RADHA Score (Last Two) 3/3/2020 2/2/2021   RADHA Raw Score 30 40   Activation Score 56 100   RADHA Level 3 4       Preferred Contact:  Need for : No  Preferred Contact: Cell      Type of Contact Today: Face to Face in Clinic    Disclaimer: This visit was completed via telemedicine video visit. The [facility] build was completed pre-COVID-19 and did not allow for the selection of a telemedicine video visit.      Data: (subjective / Objective):  Recent ED/IP Admission or Discharge?   None    Patient Goals:  Goal Areas: Social and Interpersonal Relationships;Employment / Volunteer;Transportation  Patient stated goals: 1. Patient would like to begin [facility] to assist her with organization and her  job search. Patient requested an [facility] referral be placed on her behalf by [facility]. 2. Patient would like to find a new job, ideally utilizing her College Degree (History, Minor: Protestant). She will consider utilizing the [employer] and [facility] to assist with the job search as well as interview skills. 3. Patient would like to obtain her 's license by the end of the year 2021. 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          [facility] Core Service Provided:  Comprehensive Care Management: utilized the electronic medical record / patient registry to identify and support patient's health conditions / needs more effectively   Care Coordination: provided care management services/referrals necessary to ensure patient and their identified supports have access to medical, behavioral health, pharmacology and recovery support services.  Ensured that patient's care is integrated across all settings and services.   Health and Wellness Promotion    Current Stressors / Issues / Care Plan Objective Addressed Today:  [facility] and patient met via scheduled video/virtual visit.    Patient obtained her permit again about 2 weeks ago which she is very excited about. She has been out driving a little bit as well.    [facility] services through locr and [facility] has not reached out to patient yet. [facility] informed patient that this worker will reach out to Joselyn Ceja again to inquire about this. If there continues being a delay in services, we can discuss placing a referral at a different agency. [facility] will keep patient up-to-date via [employer].    [facility] inquired about patient's last appointment with her psychiatrist, Kirill - started a new medication (Venlafaxine) for her anxiety and it has been helping so far. She states she is aiming for Monthly frequency with him but does not have an appointment currently scheduled.  We made it a goal to either get an appointment scheduled or complete an appointment by our next check-in.    [facility] shared this worker's screen to review the website for the [employer] program. Patient confirmed she already has a Proof of Eligibility Certificate.  [facility] showed patient how to review the job postings that are available and how to apply for positions that she is interested in. Patient did not have any questions but thanked this worker for reviewing.    Scheduled our next check-in on 7/13 at 10:30am via telephone.    [facility] called patient's [facility] worker, Ltety Meadows ([phone]) and left a message requesting she contact patient to schedule an appointment as patient remains interested in [facility] services. Provided her with patient's phone number again.    Per patient's request, [facility] sent patient a message via [employer] with links to websites we discussed through the [employer] program and a follow-up regarding [facility] Services.    Intervention:  Motivational Interviewing: Expressed Empathy/Understanding, Supported Autonomy, Collaboration, Evocation and Permission to raise concern or advise   Target Behavior(s): Explored thoughts and readiness to participate in individual therapy and Explored current social supports and reinforced opportunities to increase engagement    Assessment: (Progress on Goals / Homework):  Patient would benefit from continued coordination in reaching their goals set for the Behavioral Health Home ([facility]) program. [facility] reviewed Health Action Plan goals and will continue to monitor progress and work with patient and their care team.      Plan: (Homework, other):  Patient was encouraged to continue to seek condition-related information and education.      Scheduled a Phone follow up appointment with [facility] in 4 weeks     Patient has set self-identified goals and will monitor progress until the next appointment on: 7/13/2021.         Sondra Christine, [facility]  Behavioral Health Home ([facility])   Supporting the  [facility] and [facility]  [phone]

## 2021-06-16 ENCOUNTER — VIRTUAL VISIT (OUTPATIENT)
Dept: BEHAVIORAL HEALTH | Facility: CLINIC | Age: 26
End: 2021-06-16
Payer: COMMERCIAL

## 2021-06-16 DIAGNOSIS — F84.5 ASPERGER'S DISORDER: Primary | ICD-10-CM

## 2021-06-16 PROCEDURE — 90834 PSYTX W PT 45 MINUTES: CPT | Mod: 95 | Performed by: SOCIAL WORKER
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
## 2021-06-29 ENCOUNTER — VIRTUAL VISIT (OUTPATIENT)
Dept: BEHAVIORAL HEALTH | Facility: CLINIC | Age: 26
End: 2021-06-29
Payer: COMMERCIAL

## 2021-06-29 DIAGNOSIS — F84.5 ASPERGER'S DISORDER: Primary | ICD-10-CM

## 2021-06-29 PROCEDURE — 90834 PSYTX W PT 45 MINUTES: CPT | Mod: 95 | Performed by: SOCIAL WORKER
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
## 2021-07-13 ENCOUNTER — VIRTUAL VISIT (OUTPATIENT)
Dept: BEHAVIORAL HEALTH | Facility: CLINIC | Age: 26
End: 2021-07-13
Payer: COMMERCIAL

## 2021-07-13 ENCOUNTER — ALLIED HEALTH/NURSE VISIT (OUTPATIENT)
Dept: BEHAVIORAL HEALTH | Facility: CLINIC | Age: 26
End: 2021-07-13
Payer: COMMERCIAL

## 2021-07-13 DIAGNOSIS — F84.5 ASPERGER'S DISORDER: Primary | ICD-10-CM

## 2021-07-13 DIAGNOSIS — R69 DIAGNOSIS DEFERRED: Primary | ICD-10-CM

## 2021-07-13 PROCEDURE — 90832 PSYTX W PT 30 MINUTES: CPT | Mod: 95 | Performed by: SOCIAL WORKER

## 2021-07-13 NOTE — LETTER
July 13, 2021    [facility]  Behavioral Health Home  [address], [address]  [place] [address]    Dear Lynn,    As we had discussed, you are to be discharged from Behavioral Health Home ([facility]) services effective 7/13/2021 due to inactive Medical Assistance, which is a requirement of Behavioral Health Home ([facility]) services. This does not change your ability to continue receiving care from your PCP/Behavioral Health Clinician here at our Primary Care Clinic.    If you are to switch your insurance back to Medical Assistance and are interested in Behavioral Health Home ([facility]) services in the future, please ask your Primary Care Physician to discuss scheduling an appointment to re-enroll into Behavioral Health Home ([facility]) services. If you are to need assistance from a  or RN in the future, we also have the Clinic Care Coordination Program who can assist you with any questions you may have about services or programs that may be beneficial for you. Please reach out to the clinic if you would like to learn more about Clinic Care Coordination and what they can offer you.    I have listed the following agencies below if you are to need assistance with any resources regarding food, housing, transportation, utilities or emergency assistance:      [address] [place] [address]  [phone]  www.[address] [place] [address].org  Community Action Partnership (CAP) Agency [facility]  [phone]  www.capagency.org    [facility]   [phone]  Talk to a county staff member about services and economic assistance options. These include referrals and information. [employer]  [phone]  Utility and other financial assistance, Food Shelf, Resources and referrals   [facility] Service  [phone]  www.[facility].org   Serves  residents and [facility]  [phone]  http://pop[place].org/gethelp/basicneeds   [facility]  Levi Chadwick   [facility].  [phone]  www.neighbors[place].org   [place], [place], [place], [place],  [place], [place] and [place] residents [facility]  [phone]  [email or web address] Hospital[facility]s.org/[facility]/location-search/   Use the website to find a location near you   Disability [facility]  [phone]  Https://disabilityHonorHealth Scottsd[facility].org/  A great resource for reviewing programs that are offered throughout the [facility], their eligibility guidelines, and instructions on how to apply. You may also call to discuss your individual circumstances and/or questions you may have. Housing Benefits 101  [phone]  Https://[place].hb101.org/  A great resource for looking into or asking questions regarding housing and housing benefits that may be available to you.     If you have any questions regarding discharge from Behavioral Health Home ([facility]) services, please feel free to call me. My phone number is listed below. It was a pleasure working with you and I wish you all the best moving forward!    Thank you,          Sondra Christine, TOMAS  Behavioral Health Home ([facility])   [phone]

## 2021-07-13 NOTE — PROGRESS NOTES
Behavioral Health Home Services  [facility]: [place]        Social Work Care Navigator Note      Patient: Lynn Vaca  Date: July 13, 2021  Preferred Name: Lynn    Previous PHQ-9:   PHQ-9 SCORE 6/19/2019 2/2/2021 3/23/2021   PHQ-9 Total Score - - -   PHQ-9 Total Score MyChart 8 (Mild depression) - -   PHQ-9 Total Score 8 9 12     Previous PALAK-7:   PALAK-7 SCORE 6/19/2019 2/2/2021 3/23/2021   Total Score - - -   Total Score 12 (moderate anxiety) - -   Total Score 12 14 16     RADHA LEVEL:  RADHA Score (Last Two) 3/3/2020 2/2/2021   RADHA Raw Score 30 40   Activation Score 56 100   RADHA Level 3 4       Preferred Contact:  Need for : No  Preferred Contact: Cell      Type of Contact Today: Phone call (patient / identified key support person reached)      Data: (subjective / Objective):  Recent ED/IP Admission or Discharge?   None    Patient Goals:  Goal Areas: Social and Interpersonal Relationships;Employment / Volunteer;Transportation  Patient stated goals: 1. Patient would like to begin Count includes the [facility] Services to assist her with organization and her job search. Patient requested an Count includes the [facility] referral be placed on her behalf by [facility]. 2. Patient would like to find a new job, ideally utilizing her College Degree (History, Minor: Quaker). She will consider utilizing the Workforce Center and Count includes the [facility] Services to assist with the job search as well as interview skills. 3. Patient would like to obtain her 's license by the end of the year 2021. 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          [facility] Core Service Provided:  Care Coordination: provided care management services/referrals necessary to ensure patient and their identified supports have access to medical, behavioral health, pharmacology and recovery support services.  Ensured that patient's care is integrated across all settings and services.    Health and Wellness Promotion  Referral to Community and Social Support Services: Followed-up with patient on whether or not they completed a referral    Current Stressors / Issues / Care Plan Objective Addressed Today:  [facility] called patient for scheduled check-in.    Patient has changed insurances and is no longer on Medical Assistance. [facility] [place] effective 7/1/2021. Patient is aware that this makes her ineligible for Behavioral Health Home ([facility]) services and [facility] services.  Patient has not addressed this with her [facility] by [facility] worker, Letty yet due to having to cancel their last 2 appointments.    Patient has increased her hours at [employer] to 25 hours per week and she anticipates remaining at that frequency going forward. She does not anticipate reopening to [place].  If she were to switch back to [place], [facility] informed her that she may re-enroll into Behavioral Health Home ([facility]) services by speaking with PCP or Behavioral Health Clinician. Patient stated understanding.    We discussed patient's goals such as finding alternative employment through the [employer] program. Patient reported feeling more comfortable with the [employer] program website after this worker had reviewed it with her. Her next goal is to update her resume. We discussed utilizing the Workforce Center as their staff can help her with this, as well as her job search. Patient explained that due to her anxiety, she is not likely to reach out to the Workforce Center as she is not comfortable with those who she does not know. [facility] understood and recommended she speak with Behavioral Health Clinician about this anxiety and ways to cope with it, should she want to reach out to the Workforce Center for this assistance in the future.    Patient's last goal was to obtain her 's license. She continues working towards this goal by going out with one of her neighbors twice per week to practice driving. She and her mother have two cars  "and so patient is able to take one of them while practicing.    Patient denied needing assistance at this time or any resources sent.    [facility] informed patient that due to no longer being active on medical assistance, she is to discharge from Behavioral Health Home ([facility]) services effective today, 7/13/2021. [facility] will send a discharge letter via mail for her records - patient understood.  [facility] wished patient well in the future and encouraged patient on the progress that she has made since she began working with [facility] back in September.     [facility] routed note to Behavioral Health Clinician and PCP to inform them of patient's discharge from Behavioral Health Home ([facility]) services.    [facility] adjusted flowsheets to reflect \"Discharged\" Status and removed self from Care Team.    [facility] sent patient the [facility] Discharge Letter via mail. See letters for information sent.    Intervention:  Motivational Interviewing: Expressed Empathy/Understanding, Supported Autonomy, Collaboration, Evocation, Permission to raise concern or advise and Open-ended questions   Target Behavior(s): Explored thoughts and readiness to participate in individual therapy, Explored and resolved challenges to attending appointments as scheduled, Explored current social supports and reinforced opportunities to increase engagement and Obtaining 's License, Employment    Assessment: (Progress on Goals / Homework):  Patient is no longer eligible for Behavioral Health Home ([facility]) services as she is no longer on Medical Assistance. Patient is to discharge from Behavioral Health Home ([facility]) services.    Patient may reach out to PCP or Behavioral Health Clinician should she be interested in re-enrolling in the future.    Plan: (Homework, other):  Patient was encouraged to continue to seek condition-related information and education.      Sondra Christine, [facility]  Behavioral Health [place] ([facility])   Supporting the [facility] and [facility] " [facility] [place]  [phone]
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
## 2021-07-27 ENCOUNTER — VIRTUAL VISIT (OUTPATIENT)
Dept: BEHAVIORAL HEALTH | Facility: CLINIC | Age: 26
End: 2021-07-27
Payer: COMMERCIAL

## 2021-07-27 DIAGNOSIS — F84.5 ASPERGER'S DISORDER: Primary | ICD-10-CM

## 2021-07-27 PROCEDURE — 90832 PSYTX W PT 30 MINUTES: CPT | Mod: 95 | Performed by: SOCIAL WORKER
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
## 2021-08-24 ENCOUNTER — APPOINTMENT (OUTPATIENT)
Dept: BEHAVIORAL HEALTH | Facility: CLINIC | Age: 26
End: 2021-08-24
Payer: COMMERCIAL

## 2021-08-25 ENCOUNTER — ALLIED HEALTH/NURSE VISIT (OUTPATIENT)
Dept: BEHAVIORAL HEALTH | Facility: CLINIC | Age: 26
End: 2021-08-25
Payer: COMMERCIAL

## 2021-08-25 DIAGNOSIS — R69 DIAGNOSIS DEFERRED: Primary | ICD-10-CM

## 2021-08-25 NOTE — PROGRESS NOTES
Behavioral Health Home Services  [facility]: [place]        Social Work Care Navigator Note      Patient: Lynn Vaca  Date: August 25, 2021  Preferred Name: Lynn    Previous PHQ-9:   PHQ-9 SCORE 6/19/2019 2/2/2021 3/23/2021   PHQ-9 Total Score - - -   PHQ-9 Total Score MyChart 8 (Mild depression) - -   PHQ-9 Total Score 8 9 12     Previous PALAK-7:   PALAK-7 SCORE 6/19/2019 2/2/2021 3/23/2021   Total Score - - -   Total Score 12 (moderate anxiety) - -   Total Score 12 14 16     RADHA LEVEL:  RADHA Score (Last Two) 3/3/2020 2/2/2021   RADHA Raw Score 30 40   Activation Score 56 100   RADHA Level 3 4       Preferred Contact:  Need for : No  Preferred Contact: Cell      Type of Contact Today: Phone call (patient / identified key support person reached)      Data: (subjective / Objective):  Patient Goals  Goal Areas: Social and Interpersonal Relationships;Employment / Volunteer;Transportation  Patient stated goals: 1. Patient would like to begin [facility] Services to assist her with organization and her job search. Patient requested an [facility] referral be placed on her behalf by [facility]. 2. Patient would like to find a new job, ideally utilizing her College Degree (History, Minor: [employer]). She will consider utilizing the Workforce Center and [facility] Services to assist with the job search as well as interview skills. 3. Patient would like to obtain her 's license by the end of the year 2021. 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        [facility] Service Provided:  Health and Wellness Promotion     Data:  [facility] called patient to discuss options for her to switch back onto Medical Assistance. Wanted to further discuss Disability-Based MA and Medical Assistance for Employed Persons with Disability (MA-EPD).   Patient was at work at time of call. She requested a call back from this worker after 1pm  today.    Plan:  SWCC, patient, and team will continue to work towards progress on reaching goals set for the Behavioral Health Home ([facility]) program.     TOMAS Medrano  Behavioral Health Home ([facility])   Supporting the [facility] and [facility]  [phone]

## 2021-09-07 ENCOUNTER — VIRTUAL VISIT (OUTPATIENT)
Dept: BEHAVIORAL HEALTH | Facility: CLINIC | Age: 26
End: 2021-09-07
Payer: COMMERCIAL

## 2021-09-07 DIAGNOSIS — F84.5 ASPERGER'S DISORDER: Primary | ICD-10-CM

## 2021-09-07 PROCEDURE — 90832 PSYTX W PT 30 MINUTES: CPT | Mod: 95 | Performed by: SOCIAL WORKER
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
## 2021-09-21 ENCOUNTER — VIRTUAL VISIT (OUTPATIENT)
Dept: BEHAVIORAL HEALTH | Facility: CLINIC | Age: 26
End: 2021-09-21
Payer: COMMERCIAL

## 2021-09-21 DIAGNOSIS — F84.5 ASPERGER'S DISORDER: Primary | ICD-10-CM

## 2021-09-21 PROCEDURE — 90832 PSYTX W PT 30 MINUTES: CPT | Mod: 95 | Performed by: SOCIAL WORKER

## 2021-09-22 ENCOUNTER — VIRTUAL VISIT (OUTPATIENT)
Dept: BEHAVIORAL HEALTH | Facility: CLINIC | Age: 26
End: 2021-09-22
Payer: COMMERCIAL

## 2021-09-22 DIAGNOSIS — R69 DIAGNOSIS DEFERRED: Primary | ICD-10-CM

## 2021-09-22 NOTE — PROGRESS NOTES
[facility]al [facility] Home [facility]  [facility]: [place]        Social Work Care Navigator Note      Patient: Lynn Vaca  Date: September 22, 2021  Preferred Name: Lynn    Previous PHQ-9:   PHQ-9 SCORE 6/19/2019 2/2/2021 3/23/2021   PHQ-9 Total Score - - -   PHQ-9 Total Score MyChart 8 (Mild depression) - -   PHQ-9 Total Score 8 9 12     Previous PALAK-7:   PALAK-7 SCORE 6/19/2019 2/2/2021 3/23/2021   Total Score - - -   Total Score 12 (moderate anxiety) - -   Total Score 12 14 16     RADHA LEVEL:  RADHA Score (Last Two) 3/3/2020 2/2/2021   RADHA Raw Score 30 40   Activation Score 56 100   RADHA Level 3 4       Preferred Contact:  Need for : No  Preferred Contact: Cell      Type of Contact Today: Phone call (patient / identified key support person reached)      Data: (subjective / Objective):  Patient Goals  Goal Areas: Social and Interpersonal Relationships;Employment / Volunteer;Transportation  Patient stated goals: 1. Patient would like to begin [facility] to assist her with organization and her job search. Patient requested an [facility] referral be placed on her behalf by [facility]. 2. Patient would like to find a new job, ideally utilizing her College Degree (History, Minor: Muslim). She will consider utilizing the [employer] and [facility] to assist with the job search as well as interview skills. 3. Patient would like to obtain her 's license by the end of the year 2021. 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        [facility] Provided:  Care Coordination: provided care management services/referrals necessary to ensure patient and their identified supports have access to medical, behavioral health, pharmacology and recovery support services.  Ensured that patient's care is integrated across all settings and services.   Health and Wellness Promotion     Data:  [facility]  called patient to discuss options, if patient was interested, on switching back to Medical Assistance. Patient expressed interest in doing so. We briefly discussed Medical Assistance for Employed Persons with Disabilities (MA-EPD). Patient was interested in this program and requested assistance from this worker to look into this.    We scheduled an appointment on 9/28 at 12:30pm to call [facility] together to discuss patient's eligibility for MA-EPD and other programs she may qualify for.    [facility] to send patient information about MA-EPD for her to review prior to our appointment.    Behavioral Health Clinician states 's test to obtain 's license is scheduled in November.    Plan:  Patient is interested in looking into options of reopening to Medical Assistance. [facility] will support patient in looking into the options available to her.    Sondra Christine, TOMAS  Behavioral Health Home ([facility])   Supporting the [facility] and [facility]  [phone]          Next 5 appointments (look out 90 days)    Oct 05, 2021  8:40 AM  SHORT with Zulema Valdez MD  [facility] ([facility] ) [address] [place] [address] [phone]
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
## 2021-09-28 ENCOUNTER — VIRTUAL VISIT (OUTPATIENT)
Dept: BEHAVIORAL HEALTH | Facility: CLINIC | Age: 26
End: 2021-09-28
Payer: COMMERCIAL

## 2021-09-28 DIAGNOSIS — R69 DIAGNOSIS DEFERRED: Primary | ICD-10-CM

## 2021-09-28 NOTE — PROGRESS NOTES
Behavioral Health Home Services  [facility] [place]        Social Work Care Navigator Note      Patient: Lynn Vaca  Date: September 28, 2021  Preferred Name: Lynn    Previous PHQ-9:   PHQ-9 SCORE 6/19/2019 2/2/2021 3/23/2021   PHQ-9 Total Score - - -   PHQ-9 Total Score MyChart 8 (Mild depression) - -   PHQ-9 Total Score 8 9 12     Previous PALAK-7:   PALAK-7 SCORE 6/19/2019 2/2/2021 3/23/2021   Total Score - - -   Total Score 12 (moderate anxiety) - -   Total Score 12 14 16     RADHA LEVEL:  RADHA Score (Last Two) 3/3/2020 2/2/2021   RADHA Raw Score 30 40   Activation Score 56 100   RADHA Level 3 4       Preferred Contact:  Need for : No  Preferred Contact: Cell      Type of Contact Today: Phone call (patient / identified key support person reached)      Data: (subjective / Objective):  Recent ED/IP Admission or Discharge?   None    Patient Goals:  Goal Areas: Social and Interpersonal Relationships;Employment / Volunteer;Transportation  Patient stated goals: 1. Patient would like to begin [facility] Services to assist her with organization and her job search. Patient requested an [facility] referral be placed on her behalf by [facility]. 2. Patient would like to find a new job, ideally utilizing her College Degree (History, Minor: Christian). She will consider utilizing the [employer] Center and [facility] Services to assist with the job search as well as interview skills. 3. Patient would like to obtain her 's license by the end of the year 2021. 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          [facility] Service Provided:  Care Coordination: provided care management services/referrals necessary to ensure patient and their identified supports have access to medical, behavioral health, pharmacology and recovery support services.  Ensured that patient's care is integrated across all settings and  services.   Health and Wellness Promotion  Referral to Community and Social Support Services: Assisted in scheduling an appointment to a referral / service (see plan section)    Current Stressors / Issues / Care Plan Objective Addressed Today:  [facility] called patient for scheduled check-in.    [facility] reviewed what was discussed during our last check-in and current stressors:  - Patient reviewed MA-EPD information that this worker sent and she remains interested in looking into eligibility for this program.  - Patient requested assistance in calling [facility] ([phone]). [facility] added via conference call.  We were on hold for over 20 minutes. Patient states she needed to end call as she was visiting her aunt. Requested to reschedule.    [facility] will be out of the office for the majority of the next two weeks. Patient requested to schedule another appointment upon [facility]'s return vs calling [facility] on her own.      Scheduled our next check-in for 10/12 at 11:00am.      Intervention:  Motivational Interviewing: Supported Autonomy, Collaboration, Evocation and Permission to raise concern or advise   Target Behavior(s): Explored current social supports and reinforced opportunities to increase engagement    Assessment: (Progress on Goals / Homework):  Patient is interested in re-enrolling into Behavioral Health Home ([facility]) services if she becomes eligible for Medical Assistance for Employed Persons with Disabilities (MA-EPD).    Plan: (Homework, other):  Patient was encouraged to continue to seek condition-related information and education.      Scheduled a Phone follow up appointment with HOLLAND CHAU in 2 weeks     Patient has set self-identified goals and will monitor progress until the next appointment on: 10/12/2021.         TOMAS Medrano  Behavioral Health Home ([facility])   Supporting the [facility] and [employer]  [phone]            Next 5  appointments (look out 90 days)    Oct 05, 2021  8:40 AM  SHORT with Zulema Valdez MD  [facility] ([facility] ) [address] [place] [address] [phone]

## 2021-10-03 NOTE — PROGRESS NOTES
Assessment & Plan     Migraine without aura and without status migrainosus, not intractable  We gave 60 mg toradol and I recommended she go home, take 50 mg diphenhydramine and try to sleep.  We discussed trial of sumatriptan for future migraines including the importance of taking this as soon as symptoms begin.  Discussed common migraine triggers and journaling to detect headache patterns.    - SUMAtriptan (IMITREX) 25 MG tablet  Dispense: 18 tablet; Refill: 0  - ketorolac (TORADOL) injection 60 mg    Psoriasis  - fluocinonide (LIDEX) 0.05 % external cream  Dispense: 30 g; Refill: 1    Papule  Lower left eyelid.  She'll continue to monitor and if it persists I will refer her to dermatology.      Acquired pes planus of both feet  - Orthotics, Prosthetics and Custom Compression Order for DME - ONLY FOR DME    Need for vaccination  - HC FLU VAC PRESRV FREE QUAD SPLIT VIR > 6 MONTHS IM ([id number])       Depression Screening Follow Up  PHQ 10/5/2021   PHQ-9 Total Score 9   Q9: Thoughts of better off dead/self-harm past 2 weeks Several days   F/U: Thoughts of suicide or self-harm -   F/U: Safety concerns -     Patient was apparently administered a PHQ-9.  Her results were entered into her chart after I had seen her.  I was not aware of her score at the time of our visit.  I will have [facility] follow-up with her on her response to Question #9.      No follow-ups on file.    Zulema Valdez MD  [facility]        Lucas Bailey is a 25 year old who presents for the following health issues  accompanied by her mother:    HPI     Migraine Follow-Up      Since your last clinic visit, how have your headaches changed?  Worsened    How often are you getting headaches or migraines? All the time     Are you able to do normal daily activities when you have a migraine? Yes    Are you taking rescue/relief medications? (Select all that apply) ibuprofen (Advil, Motrin) and Excedrin if its really  bad    How helpful is your rescue/relief medication?  I get some relief    Are you taking any medications to prevent migraines? (Select all that apply)  No    In the past 4 weeks, how often have you gone to urgent care or the emergency room because of your headaches?  0    Current migraine:  Onset/Duration: 7 days  Description  Location: bilateral in the temporal area   Character: throbbing pain  Intensity:  severe  Progression of Symptoms:  worsening  Accompanying signs and symptoms:  Stiff neck: no  Neck or upper back pain: no  Sinus or URI symptoms no   Fever: no  Nausea or vomiting: YES - some nausea  Dizziness: YES- rarely  Numbness/tingling: no  Weakness: no  Visual changes: none  History  Head trauma: no  Family history of migraines: YES- maternal grandmother, maternal aunts  Daily pain medication use: no  Previous tests for headaches: no  Neurologist evaluation: YES- years ago when she developed migraines in middle school.  Precipitating or Alleviating factors (light/sound/sleep/caffeine): probably too her anxiety and anxious    No history of aura  Current bitemporal headache is typical for her migraine.  The duration is atypical.    Last dose of analgesic was ibuprofen taken yesterday.    Eye(s) Problem    Duration: couple months    Description:  Location: left lower lid  Pain: no   Redness: YES  Discharge: YES  She developed a bump on her lower eyelid.  It got quite large and mother drained it with a needle. Since then she has had a persistent small red bump.  Sometimes it develops a crust that comes off but the bump persists.       Rash    Duration: 1 year    Description  Location:started below the left knee and now she has patches on both hands  Itching: moderate    Intensity:  moderate    Accompanying signs and symptoms: None    History (similar episodes/previous evaluation): None    Precipitating or alleviating factors:  New exposures:  None  Recent travel: no      Therapies tried and outcome: Eucerin  "lotion helps with the itching    Patient would like to get a referral to the podiatrist to get a foot inserts.  She has a longstanding history of flat feet.        Review of Systems   as above         Objective    /70 (BP Location: Left arm, Patient Position: Sitting, Cuff Size: Adult Regular)   Pulse 65   Temp 98.7  F (37.1  C) (Temporal)   Resp 16   Ht 1.575 m (5' 2\")   Wt 61.7 kg (136 lb)   LMP 09/15/2021 (Approximate)   SpO2 99%   BMI 24.87 kg/m    Body mass index is 24.87 kg/m .  Physical Exam   GEN:  no apparent distress  EYES: sclerae and conjunctivae clear with no discharge, EOMI, left lower lid has a very small, discrete red papule - rough to the touch.  This is below the lash line.    NEURO:  No focal deficits noted, No facial asymmetry, Equal movement of all 4 extremities, Strength grossly intact, CN II-XII intact, Finger-nose-finger intact, Negative Romberg, Negative drift, Normal gait including tandem gait  SKIN: Discrete oval-shaped patch of erythema below left knee with significant scaling.             "

## 2021-10-05 ENCOUNTER — TELEPHONE (OUTPATIENT)
Dept: FAMILY MEDICINE | Facility: CLINIC | Age: 26
End: 2021-10-05

## 2021-10-05 ENCOUNTER — OFFICE VISIT (OUTPATIENT)
Dept: FAMILY MEDICINE | Facility: CLINIC | Age: 26
End: 2021-10-05
Payer: COMMERCIAL

## 2021-10-05 VITALS
OXYGEN SATURATION: 99 % | HEIGHT: 62 IN | BODY MASS INDEX: 25.03 KG/M2 | WEIGHT: 136 LBS | TEMPERATURE: 98.7 F | HEART RATE: 65 BPM | SYSTOLIC BLOOD PRESSURE: 110 MMHG | DIASTOLIC BLOOD PRESSURE: 70 MMHG | RESPIRATION RATE: 16 BRPM

## 2021-10-05 DIAGNOSIS — Z23 NEED FOR VACCINATION: ICD-10-CM

## 2021-10-05 DIAGNOSIS — M21.41 ACQUIRED PES PLANUS OF BOTH FEET: ICD-10-CM

## 2021-10-05 DIAGNOSIS — G43.009 MIGRAINE WITHOUT AURA AND WITHOUT STATUS MIGRAINOSUS, NOT INTRACTABLE: Primary | ICD-10-CM

## 2021-10-05 DIAGNOSIS — M21.42 ACQUIRED PES PLANUS OF BOTH FEET: ICD-10-CM

## 2021-10-05 DIAGNOSIS — R23.8 PAPULE: ICD-10-CM

## 2021-10-05 DIAGNOSIS — L40.9 PSORIASIS: ICD-10-CM

## 2021-10-05 PROCEDURE — 90471 IMMUNIZATION ADMIN: CPT | Performed by: FAMILY MEDICINE

## 2021-10-05 PROCEDURE — 99214 OFFICE O/P EST MOD 30 MIN: CPT | Mod: 25 | Performed by: FAMILY MEDICINE

## 2021-10-05 PROCEDURE — 96372 THER/PROPH/DIAG INJ SC/IM: CPT | Performed by: FAMILY MEDICINE

## 2021-10-05 PROCEDURE — 90686 IIV4 VACC NO PRSV 0.5 ML IM: CPT | Performed by: FAMILY MEDICINE

## 2021-10-05 RX ORDER — KETOROLAC TROMETHAMINE 30 MG/ML
60 INJECTION, SOLUTION INTRAMUSCULAR; INTRAVENOUS ONCE
Status: COMPLETED | OUTPATIENT
Start: 2021-10-05 | End: 2021-10-05

## 2021-10-05 RX ORDER — FLUOCINONIDE 0.5 MG/G
CREAM TOPICAL 2 TIMES DAILY
Qty: 30 G | Refills: 1 | Status: SHIPPED | OUTPATIENT
Start: 2021-10-05 | End: 2022-09-29

## 2021-10-05 RX ORDER — SUMATRIPTAN 25 MG/1
25 TABLET, FILM COATED ORAL
Qty: 18 TABLET | Refills: 0 | Status: SHIPPED | OUTPATIENT
Start: 2021-10-05 | End: 2021-12-23

## 2021-10-05 RX ORDER — VENLAFAXINE HYDROCHLORIDE 75 MG/1
CAPSULE, EXTENDED RELEASE ORAL
COMMUNITY
Start: 2021-09-03 | End: 2023-12-27

## 2021-10-05 RX ADMIN — KETOROLAC TROMETHAMINE 60 MG: 30 INJECTION, SOLUTION INTRAMUSCULAR; INTRAVENOUS at 09:49

## 2021-10-05 ASSESSMENT — MIFFLIN-ST. JEOR: SCORE: 1305.14

## 2021-10-05 ASSESSMENT — PATIENT HEALTH QUESTIONNAIRE - PHQ9: SUM OF ALL RESPONSES TO PHQ QUESTIONS 1-9: 9

## 2021-10-05 NOTE — NURSING NOTE
Clinic Administered Medication Documentation    Administrations This Visit     ketorolac (TORADOL) injection 60 mg     Admin Date  10/05/2021 Action  Given Dose  60 mg Route  Intramuscular Site  Right Gluteus Renzo Administered By  Kim Angulo MA    Ordering Provider: Zulema Valdez MD    NDC: 07052-591-33    Lot#: [id number]    : Serious Business    Patient Supplied?: No                  Injectable Medication Documentation    Patient was given Ketorolac Tromethamine (Toradol). Prior to medication administration, verified patients identity using patient s name and date of birth. Please see MAR and medication order for additional information. Patient instructed to remain in clinic for 15 minutes.      Was entire vial of medication used? Yes  Vial/Syringe: Single dose vial  Expiration Date:  08/31/2022  Was this medication supplied by the patient? No       Kim Angulo MA

## 2021-10-06 NOTE — TELEPHONE ENCOUNTER
"I saw patient today for multiple medical concerns.  She was given a PHQ-9 during her visit and I was not aware of this.  Her responses were entered after I had completed the visit and I later noted she indicated \"several days\" to question #9.  This is chronic for her and she does have a mental health prescriber and therapist.    I will route this to [facility] for telephone follow-up/safety eval.     PHQ 2/2/2021 3/23/2021 10/5/2021   PHQ-9 Total Score 9 12 9   Q9: Thoughts of better off dead/self-harm past 2 weeks Several days More than half the days Several days   F/U: Thoughts of suicide or self-harm - - -   F/U: Safety concerns - - -       "

## 2021-10-11 ENCOUNTER — TELEPHONE (OUTPATIENT)
Dept: BEHAVIORAL HEALTH | Facility: CLINIC | Age: 26
End: 2021-10-11

## 2021-10-11 NOTE — TELEPHONE ENCOUNTER
Patient recently met with her primary care physician and completed a PHQ 9 and indicated passive suicidal thoughts.  PCP requested [facility] reach out for support as her primary therapist.  Left a voicemail with patient regarding above and asked she contact [facility] or send a Zimbrat message.

## 2021-10-12 ENCOUNTER — VIRTUAL VISIT (OUTPATIENT)
Dept: BEHAVIORAL HEALTH | Facility: CLINIC | Age: 26
End: 2021-10-12
Payer: COMMERCIAL

## 2021-10-12 DIAGNOSIS — R69 DIAGNOSIS DEFERRED: Primary | ICD-10-CM

## 2021-10-12 NOTE — PROGRESS NOTES
Behavioral Health Home Services  [facility] [place]        Social Work Care Navigator Note      Patient: Lynn Vaca  Date: October 12, 2021  Preferred Name: Lynn    Previous PHQ-9:   PHQ-9 SCORE 2/2/2021 3/23/2021 10/5/2021   PHQ-9 Total Score - - -   PHQ-9 Total Score MyChart - - -   PHQ-9 Total Score 9 12 9     Previous PALAK-7:   PALAK-7 SCORE 6/19/2019 2/2/2021 3/23/2021   Total Score - - -   Total Score 12 (moderate anxiety) - -   Total Score 12 14 16     RADHA LEVEL:  RADHA Score (Last Two) 3/3/2020 2/2/2021   RADHA Raw Score 30 40   Activation Score 56 100   RADHA Level 3 4       Preferred Contact:  Need for : No  Preferred Contact: Cell      Type of Contact Today: Phone call (patient / identified key support person reached)      Data: (subjective / Objective):  Patient Goals  Goal Areas: Social and Interpersonal Relationships;Employment / Volunteer;Transportation  Patient stated goals: 1. Patient would like to begin [facility] Services to assist her with organization and her job search. Patient requested an [facility] referral be placed on her behalf by [facility]. 2. Patient would like to find a new job, ideally utilizing her College Degree (History, Minor: Gnosticism). She will consider utilizing the Workforce Center and [facility] Services to assist with the job search as well as interview skills. 3. Patient would like to obtain her 's license by the end of the year 2021. 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        [facility] Service Provided:  Health and Wellness Promotion  Referral to Community and Social Support Services: Assisted in scheduling an appointment to a referral / service (see plan section)     Data:  [facility] called patient for scheduled appointment. Patient expressed feeling overwhelmed as she has a lot going on currently:  - Her 's test is scheduled  - Looking for a new job.  "Patient did just get a new computer so she plans on completing  - Got a new boss at her current job but doesn't like them. Feels like she is \"sliding back.\" She is working more hours than she used to.  - Got a new dog yesterday    Patient denied feeling suicidal today, just feeling overwhelmed. Patient states she has never had a plan when she has had suicidal thoughts. She always talks to her mother when she feels suicidal but doesn't feel it is received well. Patient feels dismissed by her mother a lot as she doesn't feel her mother understands ASD very well.  [facility] offered support groups but patient states it is very difficult to meet new people so she fears she would not interact very well.  Patient likes her [employer] worker but all appointments have been over the phone so she doesn't feel them to be as beneficial as she had hoped it would be.  Forward thinking: \"I know my mom needs me... but I think about what will happen when she's gone.\"  Patient states she is close with her grandmother (95 years old), aunts, uncles, and cousins. She does state feeling supported by these family members. [facility] encouraged her to spend time with these individuals. Patient agreed to do so and agreed it would help her with depression symptoms.      Patient requested assistance in calling [facility]. [facility] added via conference call ([phone]) and we spoke to Iain. Patient provided verbal consent for this worker to speak on her behalf regarding MA-EPD and patient's eligibility.  Patient would need to get certified disabled through SMRT. Iain recommended that patient have a [employer] waiver assessment scheduled and transferred us back to [facility] to schedule this. She confirmed that a [place] Assessment will also start the process for the SMRT Assessment request.  [facility] explained what a disability certification will help her with. Patient verbalized understanding and did not have any further questions on this process. " [facility] informed her that if she were interested in filing a Social Security Disability Claim in the future, this worker may be able to get her connected to resources in helping her do so. Patient also verbalized understanding with this as well but is not interested at this time.  We were transferred back to front door and spoke to Gisela. Gisela states it is incorrect that a [employer] assessment is needed for a SMRT Certification. She recommended that we call the same department and speak with a supervisor to request a SMRT Assessment.    We did not have any time left in our appointment as it had been 60 minutes. We scheduled another appointment on 10/19 at 12:30pm.    Addendum 10/13/2021  [facility] routed note to PCP to inform her of discussion with patient related to suicidal ideation.    Plan:  Patient does not currently qualify for Behavioral Health Home (H) services but is interested in qualifying for MA-EPD. [facility] will continue supporting patient     Sondra Christine, TOMAS  Behavioral Health Home ([facility])   Supporting the [facility] and [employer]  [phone]

## 2021-10-12 NOTE — Clinical Note
Good afternoon Dr. Valdez,  I wanted to follow-up regarding my discussion with this patient yesterday about her PHQ9. She denied feeling suicidal yesterday, just feeling very overwhelmed right now. When she has suicidal thoughts, she denied ever having a plan.  Her number one support, her mother, reportedly makes her feel dismissed especially when talking to her about her anxiety and ASD symptoms.    Please let me know if you have any questions about this.  Thank you,    Sondra Christine, [facility]  Behavioral Health Home ([facility])   Supporting the [facility] and [facility]   [phone]

## 2021-10-19 ENCOUNTER — VIRTUAL VISIT (OUTPATIENT)
Dept: BEHAVIORAL HEALTH | Facility: CLINIC | Age: 26
End: 2021-10-19
Payer: COMMERCIAL

## 2021-10-19 DIAGNOSIS — R69 DIAGNOSIS DEFERRED: Primary | ICD-10-CM

## 2021-10-19 DIAGNOSIS — F84.5 ASPERGER'S DISORDER: Primary | ICD-10-CM

## 2021-10-19 PROCEDURE — 90832 PSYTX W PT 30 MINUTES: CPT | Mod: 95 | Performed by: SOCIAL WORKER

## 2021-10-19 NOTE — PROGRESS NOTES
Behavioral Health Home Services  [facility]: [place]        Social Work Care Navigator Note      Patient: Lynn Vaca  Date: October 19, 2021  Preferred Name: Lynn    Previous PHQ-9:   PHQ-9 SCORE 2/2/2021 3/23/2021 10/5/2021   PHQ-9 Total Score - - -   PHQ-9 Total Score MyChart - - -   PHQ-9 Total Score 9 12 9     Previous PALAK-7:   PALAK-7 SCORE 6/19/2019 2/2/2021 3/23/2021   Total Score - - -   Total Score 12 (moderate anxiety) - -   Total Score 12 14 16     RADHA LEVEL:  RADHA Score (Last Two) 3/3/2020 2/2/2021   RADHA Raw Score 30 40   Activation Score 56 100   RDAHA Level 3 4       Preferred Contact:  Need for : No  Preferred Contact: Cell      Type of Contact Today: Phone call (patient / identified key support person reached)      Data: (subjective / Objective):  [facility] Service Provided:  Care Coordination: provided care management services/referrals necessary to ensure patient and their identified supports have access to medical, behavioral health, pharmacology and recovery support services.  Ensured that patient's care is integrated across all settings and services.   Health and Wellness Promotion  Referral to Community and Social Support Services: Assisted in scheduling an appointment to a referral / service (see plan section)     Data:  [facility] called patient for scheduled check-in.    [facility] reviewed what was discussed during our last check-in and current stressors:  - Patient reports her grandmother (96) is not doing well so they have been trying to spend a lot of time with her this week. Her grandmother had helped to raise patient so patient is really struggling with this.  She does state her depression and anxiety has been worse in the last week. She has since started seeing Behavioral Health Clinician every week. Next appointment with Behavioral Health Clinician is today.  Patient denied suicidal thoughts today. [facility] discussed the safety plan completed with Behavioral Health Clinician on  2/2/2021. Patient confirmed she still had [facility] of [facility] and will utilize this if she should feel suicidal in the future.  [facility] acknowledged patient's strength in being able to acknowledging that she, herself is struggling, but she also acknowledged that her mom losing her mother is also really difficult. Patient agreed with this being a strength and is proud of the progress she had made in being able to be aware of other's emotions.    - Patient requested assistance in calling [facility]. [facility] added via conference call ([phone]). (Aging and Disability Department).  Patient provided verbal consent for this worker to speak on her behalf regarding MA-EPD and patient's eligibility.  Was transferred to the Adult Case Workers where we spoke with Patito.   [facility] Case Number: [id number]  We were then transferred to the [employer] team where we spoke to Meenakshi. She confirmed that patient would need to file a Social Security Disability claim and be denied before getting a SMRT assessment.     After discussing patient's options of having to apply for a Social Security Disability claim, patient decided against this and chose to remain on [facility] insurance.   Patient acknowledged that she is not eligible for Behavioral Health Home ([facility]) services as she does not intend to reopen to Medical Assistance.  [facility] wished patient well in the future. She will continue to be supported by Behavioral Health Clinician.    [facility] routed note to Behavioral Health Clinician to inform him that patient is no longer eligible for Behavioral Health Home ([facility]) services as she is choosing to stay on [employer] insurance.    Plan:  Patient has requested to discharge from Behavioral Health Home ([facility]) services. She will continue working with Behavioral Health Clinician and PCP.    Sondra Christine, [facility]  Behavioral Health Home ([facility])   Supporting the [facility] and [place]  [facility]  [phone]
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
## 2021-10-19 NOTE — Clinical Note
KEZIA Santizo,    I am just letting you know that Lynn has decided to remain on [employer] insurance instead of trying to qualify for MA-EPD. She is not eligible for [facility] at this time.    Thank you,    Sondra Christine, [facility] [place]  [facility]   Supporting the [facility] and [facility]   [phone]

## 2021-10-19 NOTE — Clinical Note
Lauren Christopher,    I met with Lynn today and assessed her safety.  She is experiencing a lot more stressors but reflected to her these are all progress.  She denies any intent or plan.  I will be seeing her weekly in addition to her arms worker and to behavioral health home care coordinator.    Sukhdev

## 2021-10-26 ENCOUNTER — TELEPHONE (OUTPATIENT)
Dept: FAMILY MEDICINE | Facility: CLINIC | Age: 26
End: 2021-10-26

## 2021-10-26 NOTE — TELEPHONE ENCOUNTER
Reason for Call:  Other appointment    Detailed comments: patient would like to see Dr. Valdez at  [facility] this AM for f/u on migraines.  States worse this AM.  Please contact patient.  Thank you.    Phone Number Patient can be reached at: Cell number on file:    Telephone Information:   Mobile [phone]   [phone]    Best Time: any    Can we leave a detailed message on this number? YES       Call taken on 10/26/2021 at 8:58 AM by Antoinette Decker

## 2022-02-12 ENCOUNTER — HEALTH MAINTENANCE LETTER (OUTPATIENT)
Age: 27
End: 2022-02-12

## 2022-02-12 ASSESSMENT — PATIENT HEALTH QUESTIONNAIRE - PHQ9
10. IF YOU CHECKED OFF ANY PROBLEMS, HOW DIFFICULT HAVE THESE PROBLEMS MADE IT FOR YOU TO DO YOUR WORK, TAKE CARE OF THINGS AT HOME, OR GET ALONG WITH OTHER PEOPLE: NOT DIFFICULT AT ALL
SUM OF ALL RESPONSES TO PHQ QUESTIONS 1-9: 8
SUM OF ALL RESPONSES TO PHQ QUESTIONS 1-9: 8

## 2022-02-13 ASSESSMENT — PATIENT HEALTH QUESTIONNAIRE - PHQ9: SUM OF ALL RESPONSES TO PHQ QUESTIONS 1-9: 8

## 2022-02-15 ENCOUNTER — OFFICE VISIT (OUTPATIENT)
Dept: FAMILY MEDICINE | Facility: CLINIC | Age: 27
End: 2022-02-15
Payer: COMMERCIAL

## 2022-02-15 VITALS
BODY MASS INDEX: 25.24 KG/M2 | SYSTOLIC BLOOD PRESSURE: 118 MMHG | DIASTOLIC BLOOD PRESSURE: 80 MMHG | OXYGEN SATURATION: 100 % | WEIGHT: 138 LBS | TEMPERATURE: 97.9 F | HEART RATE: 73 BPM

## 2022-02-15 DIAGNOSIS — H92.01 RIGHT EAR PAIN: Primary | ICD-10-CM

## 2022-02-15 DIAGNOSIS — M21.41 BILATERAL PES PLANUS: ICD-10-CM

## 2022-02-15 DIAGNOSIS — M21.6X2 PRONATION OF BOTH FEET: ICD-10-CM

## 2022-02-15 DIAGNOSIS — G43.009 MIGRAINE WITHOUT AURA AND WITHOUT STATUS MIGRAINOSUS, NOT INTRACTABLE: ICD-10-CM

## 2022-02-15 DIAGNOSIS — M21.6X1 PRONATION OF BOTH FEET: ICD-10-CM

## 2022-02-15 DIAGNOSIS — M21.42 BILATERAL PES PLANUS: ICD-10-CM

## 2022-02-15 PROCEDURE — 99213 OFFICE O/P EST LOW 20 MIN: CPT | Performed by: FAMILY MEDICINE

## 2022-02-15 RX ORDER — SUMATRIPTAN 25 MG/1
25 TABLET, FILM COATED ORAL
Qty: 18 TABLET | Refills: 0 | Status: SHIPPED | OUTPATIENT
Start: 2022-02-15 | End: 2022-09-29

## 2022-02-15 ASSESSMENT — COLUMBIA-SUICIDE SEVERITY RATING SCALE - C-SSRS
2. IN THE PAST MONTH, HAVE YOU ACTUALLY HAD ANY THOUGHTS OF KILLING YOURSELF?: NO
6. HAVE YOU EVER DONE ANYTHING, STARTED TO DO ANYTHING, OR PREPARED TO DO ANYTHING TO END YOUR LIFE?: NO
1. WITHIN THE PAST MONTH, HAVE YOU WISHED YOU WERE DEAD OR WISHED YOU COULD GO TO SLEEP AND NOT WAKE UP?: YES

## 2022-02-15 NOTE — PROGRESS NOTES
Assessment & Plan     Right ear pain  Mildly tender tragus.  Discussed that this may have become irritated and sleeping on the right side is perpetuating the irritation; I suggested she try sleeping on her left side for now.     Migraine without aura and without status migrainosus, not intractable  Frequent but well controlled with rescue meds (ibuprofen or sumatriptan).  I recommended cutting back on caffeine.  - SUMAtriptan (IMITREX) 25 MG tablet  Dispense: 18 tablet; Refill: 0    Bilateral pes planus  Pronation of both feet  - Orthotics, Prosthetics and Custom Compression Order for DME - ONLY FOR DME       Depression Screening Follow Up  PHQ 2/12/2022   PHQ-9 Total Score 8   Q9: Thoughts of better off dead/self-harm past 2 weeks Several days   F/U: Thoughts of suicide or self-harm No   F/U: Safety concerns No     Last PHQ-9 2/12/2022   1.  Little interest or pleasure in doing things 1   2.  Feeling down, depressed, or hopeless 2   3.  Trouble falling or staying asleep, or sleeping too much 0   4.  Feeling tired or having little energy 1   5.  Poor appetite or overeating 0   6.  Feeling bad about yourself 3   7.  Trouble concentrating 0   8.  Moving slowly or restless 0   Q9: Thoughts of better off dead/self-harm past 2 weeks 1   PHQ-9 Total Score 8   Difficulty at work, home, or with people -   In the past two weeks have you had thoughts of suicide or self harm? No   Do you have concerns about your personal safety or the safety of others? No       C-SSRS (Brief [place]) 2/15/2022   Within the last month, have you wished you were dead or wished you could go to sleep and not wake up? Yes   Within the last month, have you had any actual thoughts of killing yourself? No   Within the last month, have you ever done anything, started to do anything, or prepared to do anything to end your life? No     Chronic, passive SI is unchanged.  She denies intent or plan.  States she would never end her life due to the effect  it would have on her loved ones.      Follow Up  Follow Up Actions Taken  Crisis resource information provided in the After Visit Summary  She will schedule f/u with her mental health team.    No follow-ups on file.    Zulema Valdez MD  [facility]      Lucas Bailey is a 26 year old who presents for the following health issues        Concern - Ear pain   Onset: x1 month   Description: Patients states that when she sleeps on right side she wakes up to right ear discomfort   Intensity: moderate  Progression of Symptoms:  same  Accompanying Signs & Symptoms: none   Previous history of similar problem: yes but it went away   Precipitating factors:        Worsened by: sleeping on it   Alleviating factors:        Improved by: not sleeping on it   Therapies tried and outcome:  none   Achy pain in the outer right ear.    She notes this when she wakes up.  It gets better after about a half hour.  No change in hearing.  Ear does not feel plugged.  No recent URI's - no nasal congestion or sore throat.     Migraine Follow-Up     Since your last clinic visit, how have your headaches changed?  No change    How often are you getting headaches or migraines? 3 times a week     Are you able to do normal daily activities when you have a migraine? Yes    Are you taking rescue/relief medications? (Select all that apply) ibuprofen (Advil, Motrin) and sumatriptan (Imitrex)    How helpful is your rescue/relief medication?  I get total relief    Are you taking any medications to prevent migraines? (Select all that apply)  No    In the past 4 weeks, how often have you gone to urgent care or the emergency room because of your headaches?  0    Menstrual Concern  She had two periods in the month of December but then in January had one regular period.  No menses yet in Feb.    Foot Concern  She has longstanding flat feet.  She'd like referral for orthotics.      Answers for HPI/ROS submitted by the patient  on 2/12/2022  If you checked off any problems, how difficult have these problems made it for you to do your work, take care of things at home, or get along with other people?: Not difficult at all  PHQ9 TOTAL SCORE: 8  How many servings of fruits and vegetables do you eat daily?: 2-3  On average, how many sweetened beverages do you drink each day (Examples: soda, juice, sweet tea, etc.  Do NOT count diet or artificially sweetened beverages)?: 1  How many minutes a day do you exercise enough to make your heart beat faster?: 30 to 60  How many days a week do you exercise enough to make your heart beat faster?: 6  How many days per week do you miss taking your medication?: 0      Review of Systems         Objective    /80   Pulse 73   Temp 97.9  F (36.6  C)   Wt 62.6 kg (138 lb)   LMP  (LMP Unknown)   SpO2 100%   BMI 25.24 kg/m    Body mass index is 25.24 kg/m .  Physical Exam   GEN:  no apparent distress  EYES: sclerae and conjunctivae clear with no discharge   ENT: external ears and nose without lesions or scars, TM's and canals clear bilaterally, she has mild tenderness with palpation of the right tragus with no apparent erythema or edema  NECK:  Supple without adenopathy, mass, or thyromegaly   PSYCH:  Appearance/Behavior: patient is appropriately and casually dressed.  Speech:  normal  rate, rhythm, and tone.  Mood/Affect: Bright/congruent.  Insight: good   FEET: bilateral pes planus with significant pronation with gait

## 2022-02-15 NOTE — PATIENT INSTRUCTIONS
I kindly request that you check your MyChart prior to all appointments with me and complete any assigned questionnaires ahead of time.      If you have not already done so, I encourage you to sign up for Mychart ([email or web address]).  This will allow you to review your results, securely communicate with your care team, and schedule virtual visits as well.    FYI:  I do virtual (video) visits exclusively on Wednesdays.  I still do in-person visits at [facility] ([phone]) on Mondays, Tuesdays and Thursdays.  You can schedule a video visit for many conditions.  Please follow this link:  [email or web address]    To schedule any ordered imaging studies (including mammogram) you can call [facility] Scheduling at [phone].  If you are scheduling a DEXA (bone density) scan please do NOT schedule this at the [facility].  Please ask that it be done at [facility] in [place].  Other preferred DEXA locations include [place] (at the [facility]), [place] ([place]), or [place].

## 2022-09-29 ENCOUNTER — OFFICE VISIT (OUTPATIENT)
Dept: FAMILY MEDICINE | Facility: CLINIC | Age: 27
End: 2022-09-29
Payer: COMMERCIAL

## 2022-09-29 VITALS
BODY MASS INDEX: 26.24 KG/M2 | DIASTOLIC BLOOD PRESSURE: 88 MMHG | WEIGHT: 139 LBS | RESPIRATION RATE: 15 BRPM | HEIGHT: 61 IN | SYSTOLIC BLOOD PRESSURE: 110 MMHG | HEART RATE: 88 BPM | OXYGEN SATURATION: 99 % | TEMPERATURE: 98 F

## 2022-09-29 DIAGNOSIS — G43.009 MIGRAINE WITHOUT AURA AND WITHOUT STATUS MIGRAINOSUS, NOT INTRACTABLE: ICD-10-CM

## 2022-09-29 DIAGNOSIS — L40.9 PSORIASIS: ICD-10-CM

## 2022-09-29 DIAGNOSIS — Z23 NEED FOR VACCINATION: ICD-10-CM

## 2022-09-29 DIAGNOSIS — Z12.4 SCREENING FOR CERVICAL CANCER: ICD-10-CM

## 2022-09-29 DIAGNOSIS — D49.2 NEOPLASM OF SKIN OF SCALP: Primary | ICD-10-CM

## 2022-09-29 PROCEDURE — 90471 IMMUNIZATION ADMIN: CPT | Performed by: FAMILY MEDICINE

## 2022-09-29 PROCEDURE — 0124A COVID-19,PF,PFIZER BOOSTER BIVALENT: CPT | Performed by: FAMILY MEDICINE

## 2022-09-29 PROCEDURE — 90686 IIV4 VACC NO PRSV 0.5 ML IM: CPT | Performed by: FAMILY MEDICINE

## 2022-09-29 PROCEDURE — 99213 OFFICE O/P EST LOW 20 MIN: CPT | Mod: 25 | Performed by: FAMILY MEDICINE

## 2022-09-29 PROCEDURE — 91312 COVID-19,PF,PFIZER BOOSTER BIVALENT: CPT | Performed by: FAMILY MEDICINE

## 2022-09-29 PROCEDURE — G0145 SCR C/V CYTO,THINLAYER,RESCR: HCPCS | Performed by: FAMILY MEDICINE

## 2022-09-29 RX ORDER — SUMATRIPTAN 25 MG/1
25 TABLET, FILM COATED ORAL
Qty: 18 TABLET | Refills: 1 | Status: SHIPPED | OUTPATIENT
Start: 2022-09-29 | End: 2023-12-27

## 2022-09-29 RX ORDER — FLUOCINONIDE 0.5 MG/G
CREAM TOPICAL 2 TIMES DAILY
Qty: 30 G | Refills: 1 | Status: SHIPPED | OUTPATIENT
Start: 2022-09-29 | End: 2023-12-27

## 2022-09-29 ASSESSMENT — PATIENT HEALTH QUESTIONNAIRE - PHQ9
SUM OF ALL RESPONSES TO PHQ QUESTIONS 1-9: 10
10. IF YOU CHECKED OFF ANY PROBLEMS, HOW DIFFICULT HAVE THESE PROBLEMS MADE IT FOR YOU TO DO YOUR WORK, TAKE CARE OF THINGS AT HOME, OR GET ALONG WITH OTHER PEOPLE: SOMEWHAT DIFFICULT
SUM OF ALL RESPONSES TO PHQ QUESTIONS 1-9: 10

## 2022-09-29 NOTE — PROGRESS NOTES
Assessment & Plan     Neoplasm of skin of scalp  Appears consistent with large dermal nevus.  Likely quite vascular.  I recommend she have dermatology remove it.    - Adult Dermatology Referral    Psoriasis  - fluocinonide (LIDEX) 0.05 % external cream  Dispense: 30 g; Refill: 1    Migraine without aura and without status migrainosus, not intractable  well controlled   - SUMAtriptan (IMITREX) 25 MG tablet  Dispense: 18 tablet; Refill: 1    Screening for cervical cancer  Discussed that she is due for screening pap smear and she elected to get this done today.   - Pap screen reflex to HPV if ASCUS - recommend age 25 - 29  - HPV Hold (Lab Only)    Need for vaccination  - INFLUENZA VACCINE IM > 6 MONTHS VALENT IIV4 (AFLURIA/FLUZONE)  - COVID-19,PF,PFIZER BOOSTER BIVALENT (12+YRS)      Depression Screening Follow Up  PHQ 9/29/2022   PHQ-9 Total Score 10   Q9: Thoughts of better off dead/self-harm past 2 weeks Several days   F/U: Thoughts of suicide or self-harm No   F/U: Safety concerns No     Last PHQ-9 9/29/2022   1.  Little interest or pleasure in doing things 1   2.  Feeling down, depressed, or hopeless 2   3.  Trouble falling or staying asleep, or sleeping too much 1   4.  Feeling tired or having little energy 2   5.  Poor appetite or overeating 0   6.  Feeling bad about yourself 3   7.  Trouble concentrating 0   8.  Moving slowly or restless 0   Q9: Thoughts of better off dead/self-harm past 2 weeks 1   PHQ-9 Total Score 10   Difficulty at work, home, or with people -   In the past two weeks have you had thoughts of suicide or self harm? No   Do you have concerns about your personal safety or the safety of others? No       Follow Up  Follow Up Actions Taken  Crisis resource information provided in the After Visit Summary  Referred patient back to mental health provider  Reviewed emergency supports including text line and 988.    See Patient Instructions    No follow-ups on file.    Zulema Valdez MD  [facility]  "[facility]        Lucas Bailey is a 26 year old, presenting for the following health issues:  Recheck Medication and Lesion Removal      History of Present Illness       Reason for visit:  To get a mole on the back of my head removed and to get more psoriasis cream for my shin.    She eats 2-3 servings of fruits and vegetables daily.She consumes 1 sweetened beverage(s) daily.She exercises with enough effort to increase her heart rate 10 to 19 minutes per day.  She exercises with enough effort to increase her heart rate 7 days per week.   She is taking medications regularly.    Today's PHQ-9         PHQ-9 Total Score: 10    PHQ-9 Q9 Thoughts of better off dead/self-harm past 2 weeks :   Several days  Thoughts of suicide or self harm: (P) No  Self-harm Plan:     Self-harm Action:       Safety concerns for self or others: (P) No    How difficult have these problems made it for you to do your work, take care of things at home, or get along with other people: Somewhat difficult       Mole on back of her head has been present for a long time.  She doesn't mind it except that it gets accidentally cut or combed with her hair care and then it bleeds or gets irritated.    She continues to have depression with passive SI which is longstanding and unchanged - maybe a bit better lately.  She has a therapist and prescriber for her mental health.  She notes that her mother and her new dog are protective factors.     Her migraines have been better although she notes she has just started a new job at a local [employer].  Starting a new job is stressful and stress is one of her migraine triggers.      Review of Systems         Objective    /88 (BP Location: Right arm, Patient Position: Sitting, Cuff Size: Adult Regular)   Pulse 88   Temp 98  F (36.7  C) (Temporal)   Resp 15   Ht 1.549 m (5' 1\")   Wt 63 kg (139 lb)   LMP 09/22/2022 (Approximate)   SpO2 99%   Breastfeeding No   BMI 26.26 " kg/m    Body mass index is 26.26 kg/m .  Physical Exam   GENERAL APPEARANCE: healthy, alert and no distress  :  vulva, vagina, and cervix are normal in appearance and pap smear was obtained using pediatric speculum  SKIN: large, fleshy, exophytic lesion on right side of posterior scalp.    NEURO: Normal strength and tone, sensory exam grossly normal, mentation intact and speech normal  PSYCH:    Appearance: appropriately and casually dressed    Eye Contact: good  Behavior: calm  Attitude: cooperative    Speech:  normal rate, rhythm, and tone    Thought Form: organized and goal oriented    Thought Content: no evidence of psychotic thought    Mood: euthymic    Affect: appropriate and in normal range    Insight: good

## 2022-09-29 NOTE — PATIENT INSTRUCTIONS
If you have MyChart:  1) I kindly request that you check your MyChart prior to all appointments with me and complete any assigned questionnaires ahead of time.    2) You may receive auto-released results from our system before I have the opportunity to review and comment.  Be assured I will review and comment on all of your results as soon as I can.      FYI:  1) I do virtual (video) visits exclusively on Wednesdays.  I still do in-person visits at [facility] ([phone]) on Mondays, Tuesdays and Thursdays.  You can schedule a video visit for many conditions.  Please follow this link:  [email or web address]  2) My schedule has been booking out very far in advance (2 months).  I apologize for the lack of timely access.  If you need to be seen for a chronic condition or preventive (wellness) visit, please be sure to schedule that appointment 2-3 months in advance.  If you have a concern that you feel cannot wait until my next available appointment (such as a hospital follow-up or new symptom of concern) please ask to speak to one of the [facility] nurses who may be able to access a sooner appointment.    I do ask that all patients who are taking chronic medications for conditions that I am managing schedule an in-person visit with me at least once a year.     To schedule any ordered imaging studies (including mammogram and/or DEXA scan) you can call [facility] Scheduling at [phone].

## 2022-09-29 NOTE — NURSING NOTE
Prior to immunization administration, verified patients identity using patient s name and date of birth. Please see Immunization Activity for additional information.     Screening Questionnaire for Adult Immunization    Are you sick today?   No   Do you have allergies to medications, food, a vaccine component or latex?   No   Have you ever had a serious reaction after receiving a vaccination?   No   Do you have a long-term health problem with heart, lung, kidney, or metabolic disease (e.g., diabetes), asthma, a blood disorder, no spleen, complement component deficiency, a cochlear implant, or a spinal fluid leak?  Are you on long-term aspirin therapy?   No   Do you have cancer, leukemia, HIV/AIDS, or any other immune system problem?   No   Do you have a parent, brother, or sister with an immune system problem?   No   In the past 3 months, have you taken medications that affect  your immune system, such as prednisone, other steroids, or anticancer drugs; drugs for the treatment of rheumatoid arthritis, Crohn s disease, or psoriasis; or have you had radiation treatments?   No   Have you had a seizure, or a brain or other nervous system problem?   No   During the past year, have you received a transfusion of blood or blood    products, or been given immune (gamma) globulin or antiviral drug?   No   For women: Are you pregnant or is there a chance you could become       pregnant during the next month?   No   Have you received any vaccinations in the past 4 weeks?   No     Immunization questionnaire answers were all negative.        Per orders of Dr. Valdez, injection of flu and pfizer bivalent given by Ashly Gtz. Patient instructed to remain in clinic for 15 minutes afterwards, and to report any adverse reaction to me immediately.       Screening performed by Ashly Gtz on 9/29/2022 at 2:53 PM.

## 2022-10-04 LAB
BKR LAB AP GYN ADEQUACY: NORMAL
BKR LAB AP GYN INTERPRETATION: NORMAL
BKR LAB AP HPV REFLEX: NORMAL
BKR LAB AP LMP: NORMAL
BKR LAB AP PREVIOUS ABNORMAL: NORMAL
PATH REPORT.COMMENTS IMP SPEC: NORMAL
PATH REPORT.COMMENTS IMP SPEC: NORMAL
PATH REPORT.RELEVANT HX SPEC: NORMAL

## 2023-02-18 ENCOUNTER — HEALTH MAINTENANCE LETTER (OUTPATIENT)
Age: 28
End: 2023-02-18

## 2023-03-22 ASSESSMENT — ANXIETY QUESTIONNAIRES
GAD7 TOTAL SCORE: 9
7. FEELING AFRAID AS IF SOMETHING AWFUL MIGHT HAPPEN: SEVERAL DAYS
1. FEELING NERVOUS, ANXIOUS, OR ON EDGE: NEARLY EVERY DAY
GAD7 TOTAL SCORE: 9
8. IF YOU CHECKED OFF ANY PROBLEMS, HOW DIFFICULT HAVE THESE MADE IT FOR YOU TO DO YOUR WORK, TAKE CARE OF THINGS AT HOME, OR GET ALONG WITH OTHER PEOPLE?: NOT DIFFICULT AT ALL
7. FEELING AFRAID AS IF SOMETHING AWFUL MIGHT HAPPEN: SEVERAL DAYS
2. NOT BEING ABLE TO STOP OR CONTROL WORRYING: SEVERAL DAYS
5. BEING SO RESTLESS THAT IT IS HARD TO SIT STILL: NOT AT ALL
6. BECOMING EASILY ANNOYED OR IRRITABLE: SEVERAL DAYS
GAD7 TOTAL SCORE: 9
3. WORRYING TOO MUCH ABOUT DIFFERENT THINGS: NEARLY EVERY DAY
IF YOU CHECKED OFF ANY PROBLEMS ON THIS QUESTIONNAIRE, HOW DIFFICULT HAVE THESE PROBLEMS MADE IT FOR YOU TO DO YOUR WORK, TAKE CARE OF THINGS AT HOME, OR GET ALONG WITH OTHER PEOPLE: NOT DIFFICULT AT ALL
4. TROUBLE RELAXING: NOT AT ALL

## 2023-03-24 ENCOUNTER — VIRTUAL VISIT (OUTPATIENT)
Dept: BEHAVIORAL HEALTH | Facility: CLINIC | Age: 28
End: 2023-03-24
Payer: COMMERCIAL

## 2023-03-24 DIAGNOSIS — F84.5 ASPERGER'S DISORDER: Primary | ICD-10-CM

## 2023-03-24 PROCEDURE — 90834 PSYTX W PT 45 MINUTES: CPT | Mod: VID | Performed by: SOCIAL WORKER

## 2023-03-24 ASSESSMENT — PATIENT HEALTH QUESTIONNAIRE - PHQ9
SUM OF ALL RESPONSES TO PHQ QUESTIONS 1-9: 12
10. IF YOU CHECKED OFF ANY PROBLEMS, HOW DIFFICULT HAVE THESE PROBLEMS MADE IT FOR YOU TO DO YOUR WORK, TAKE CARE OF THINGS AT HOME, OR GET ALONG WITH OTHER PEOPLE: NOT DIFFICULT AT ALL
SUM OF ALL RESPONSES TO PHQ QUESTIONS 1-9: 12
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
## 2023-04-14 ENCOUNTER — VIRTUAL VISIT (OUTPATIENT)
Dept: BEHAVIORAL HEALTH | Facility: CLINIC | Age: 28
End: 2023-04-14
Payer: COMMERCIAL

## 2023-04-14 DIAGNOSIS — F33.0 MILD RECURRENT MAJOR DEPRESSION (H): Primary | ICD-10-CM

## 2023-04-14 DIAGNOSIS — F84.5 ASPERGER'S DISORDER: ICD-10-CM

## 2023-04-14 PROCEDURE — 90832 PSYTX W PT 30 MINUTES: CPT | Mod: VID | Performed by: SOCIAL WORKER
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
## 2023-04-28 ENCOUNTER — VIRTUAL VISIT (OUTPATIENT)
Dept: BEHAVIORAL HEALTH | Facility: CLINIC | Age: 28
End: 2023-04-28
Payer: COMMERCIAL

## 2023-04-28 DIAGNOSIS — F84.5 ASPERGER'S DISORDER: Primary | ICD-10-CM

## 2023-04-28 PROCEDURE — 90832 PSYTX W PT 30 MINUTES: CPT | Mod: VID | Performed by: SOCIAL WORKER

## 2023-04-28 ASSESSMENT — PATIENT HEALTH QUESTIONNAIRE - PHQ9
SUM OF ALL RESPONSES TO PHQ QUESTIONS 1-9: 7
SUM OF ALL RESPONSES TO PHQ QUESTIONS 1-9: 7
10. IF YOU CHECKED OFF ANY PROBLEMS, HOW DIFFICULT HAVE THESE PROBLEMS MADE IT FOR YOU TO DO YOUR WORK, TAKE CARE OF THINGS AT HOME, OR GET ALONG WITH OTHER PEOPLE: NOT DIFFICULT AT ALL

## 2023-04-28 ASSESSMENT — ANXIETY QUESTIONNAIRES
5. BEING SO RESTLESS THAT IT IS HARD TO SIT STILL: NOT AT ALL
GAD7 TOTAL SCORE: 10
GAD7 TOTAL SCORE: 10
7. FEELING AFRAID AS IF SOMETHING AWFUL MIGHT HAPPEN: MORE THAN HALF THE DAYS
7. FEELING AFRAID AS IF SOMETHING AWFUL MIGHT HAPPEN: MORE THAN HALF THE DAYS
2. NOT BEING ABLE TO STOP OR CONTROL WORRYING: MORE THAN HALF THE DAYS
1. FEELING NERVOUS, ANXIOUS, OR ON EDGE: MORE THAN HALF THE DAYS
8. IF YOU CHECKED OFF ANY PROBLEMS, HOW DIFFICULT HAVE THESE MADE IT FOR YOU TO DO YOUR WORK, TAKE CARE OF THINGS AT HOME, OR GET ALONG WITH OTHER PEOPLE?: NOT DIFFICULT AT ALL
IF YOU CHECKED OFF ANY PROBLEMS ON THIS QUESTIONNAIRE, HOW DIFFICULT HAVE THESE PROBLEMS MADE IT FOR YOU TO DO YOUR WORK, TAKE CARE OF THINGS AT HOME, OR GET ALONG WITH OTHER PEOPLE: NOT DIFFICULT AT ALL
GAD7 TOTAL SCORE: 10
6. BECOMING EASILY ANNOYED OR IRRITABLE: MORE THAN HALF THE DAYS
4. TROUBLE RELAXING: NOT AT ALL
3. WORRYING TOO MUCH ABOUT DIFFERENT THINGS: MORE THAN HALF THE DAYS
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
## 2023-05-12 ENCOUNTER — VIRTUAL VISIT (OUTPATIENT)
Dept: BEHAVIORAL HEALTH | Facility: CLINIC | Age: 28
End: 2023-05-12
Payer: COMMERCIAL

## 2023-05-12 DIAGNOSIS — F84.5 ASPERGER'S DISORDER: Primary | ICD-10-CM

## 2023-05-12 PROCEDURE — 90832 PSYTX W PT 30 MINUTES: CPT | Mod: VID | Performed by: SOCIAL WORKER
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
## 2023-06-28 ENCOUNTER — VIRTUAL VISIT (OUTPATIENT)
Dept: BEHAVIORAL HEALTH | Facility: CLINIC | Age: 28
End: 2023-06-28
Payer: COMMERCIAL

## 2023-06-28 DIAGNOSIS — F84.5 ASPERGER'S DISORDER: Primary | ICD-10-CM

## 2023-06-28 PROCEDURE — 90832 PSYTX W PT 30 MINUTES: CPT | Mod: VID | Performed by: SOCIAL WORKER

## 2023-06-28 ASSESSMENT — ANXIETY QUESTIONNAIRES
8. IF YOU CHECKED OFF ANY PROBLEMS, HOW DIFFICULT HAVE THESE MADE IT FOR YOU TO DO YOUR WORK, TAKE CARE OF THINGS AT HOME, OR GET ALONG WITH OTHER PEOPLE?: NOT DIFFICULT AT ALL
GAD7 TOTAL SCORE: 9
6. BECOMING EASILY ANNOYED OR IRRITABLE: SEVERAL DAYS
3. WORRYING TOO MUCH ABOUT DIFFERENT THINGS: MORE THAN HALF THE DAYS
2. NOT BEING ABLE TO STOP OR CONTROL WORRYING: MORE THAN HALF THE DAYS
7. FEELING AFRAID AS IF SOMETHING AWFUL MIGHT HAPPEN: MORE THAN HALF THE DAYS
GAD7 TOTAL SCORE: 9
7. FEELING AFRAID AS IF SOMETHING AWFUL MIGHT HAPPEN: MORE THAN HALF THE DAYS
1. FEELING NERVOUS, ANXIOUS, OR ON EDGE: MORE THAN HALF THE DAYS
5. BEING SO RESTLESS THAT IT IS HARD TO SIT STILL: NOT AT ALL
GAD7 TOTAL SCORE: 9
4. TROUBLE RELAXING: NOT AT ALL
IF YOU CHECKED OFF ANY PROBLEMS ON THIS QUESTIONNAIRE, HOW DIFFICULT HAVE THESE PROBLEMS MADE IT FOR YOU TO DO YOUR WORK, TAKE CARE OF THINGS AT HOME, OR GET ALONG WITH OTHER PEOPLE: NOT DIFFICULT AT ALL

## 2023-06-28 ASSESSMENT — PATIENT HEALTH QUESTIONNAIRE - PHQ9
SUM OF ALL RESPONSES TO PHQ QUESTIONS 1-9: 6
SUM OF ALL RESPONSES TO PHQ QUESTIONS 1-9: 6
10. IF YOU CHECKED OFF ANY PROBLEMS, HOW DIFFICULT HAVE THESE PROBLEMS MADE IT FOR YOU TO DO YOUR WORK, TAKE CARE OF THINGS AT HOME, OR GET ALONG WITH OTHER PEOPLE: NOT DIFFICULT AT ALL

## 2023-06-28 NOTE — LETTER
June 28, 2023      Lynn Vaca  [address]  [place] [address]        Dear ,    We are writing to inform you of your test results.    {results letter list:[id number]}    No results found from the In Basket message.    If you have any questions or concerns, please call the clinic at the number listed above.       Sincerely,

## 2023-06-28 NOTE — LETTER
June 28, 2023      Lynn Vaca  [address]  [place] [address]        To Whom It May Concern:      To Whom It May Concern:      Lynn Vacais a client/patient who receives services from [facility] and has been my patient since September 11, 2019. I have knowledge of Ms. Vaca s disability.   I have reviewed the information provided to me in the Application for Eligibility for Connect 700 Program  and I certify that Ms. Tate disabilities are of such severe nature that they are unable to demonstrate their skills and abilities in the standard competitive selection process and there is no reasonable accommodation that  would enable them to demonstrate their skills and abilities in the standard competitive selection process.     This letter certifies that it is my opinion that Ms. Vaca is eligible to participate in the Connect 700 Program.        Sincerely,        Dav Davis, [facility]      Dav Davis, MSW, [address] [address]  Clinic # [phone]  Behavioral scheduling/message  # [phone]  Gender pronouns: he/him
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
## 2023-08-02 ENCOUNTER — VIRTUAL VISIT (OUTPATIENT)
Dept: BEHAVIORAL HEALTH | Facility: CLINIC | Age: 28
End: 2023-08-02
Payer: COMMERCIAL

## 2023-08-02 DIAGNOSIS — F84.5 ASPERGER'S DISORDER: Primary | ICD-10-CM

## 2023-08-02 PROCEDURE — 90791 PSYCH DIAGNOSTIC EVALUATION: CPT | Mod: VID | Performed by: SOCIAL WORKER

## 2023-08-02 ASSESSMENT — PATIENT HEALTH QUESTIONNAIRE - PHQ9
10. IF YOU CHECKED OFF ANY PROBLEMS, HOW DIFFICULT HAVE THESE PROBLEMS MADE IT FOR YOU TO DO YOUR WORK, TAKE CARE OF THINGS AT HOME, OR GET ALONG WITH OTHER PEOPLE: NOT DIFFICULT AT ALL
SUM OF ALL RESPONSES TO PHQ QUESTIONS 1-9: 10
SUM OF ALL RESPONSES TO PHQ QUESTIONS 1-9: 10

## 2023-08-02 ASSESSMENT — ANXIETY QUESTIONNAIRES
3. WORRYING TOO MUCH ABOUT DIFFERENT THINGS: NEARLY EVERY DAY
4. TROUBLE RELAXING: NOT AT ALL
1. FEELING NERVOUS, ANXIOUS, OR ON EDGE: NEARLY EVERY DAY
GAD7 TOTAL SCORE: 11
5. BEING SO RESTLESS THAT IT IS HARD TO SIT STILL: NOT AT ALL
6. BECOMING EASILY ANNOYED OR IRRITABLE: MORE THAN HALF THE DAYS
GAD7 TOTAL SCORE: 11
2. NOT BEING ABLE TO STOP OR CONTROL WORRYING: NEARLY EVERY DAY
7. FEELING AFRAID AS IF SOMETHING AWFUL MIGHT HAPPEN: NOT AT ALL
IF YOU CHECKED OFF ANY PROBLEMS ON THIS QUESTIONNAIRE, HOW DIFFICULT HAVE THESE PROBLEMS MADE IT FOR YOU TO DO YOUR WORK, TAKE CARE OF THINGS AT HOME, OR GET ALONG WITH OTHER PEOPLE: NOT DIFFICULT AT ALL

## 2023-08-02 NOTE — PROGRESS NOTES
[facility] Primary Care: Integrated Behavioral Health  Provider Name:  Dav Davis     Credentials:  MSW, LICSW  Updated annual diagnostic assessment    PATIENT'S NAME: Erasmo Vaca  PREFERRED NAME: erasmo  PRONOUNS:   harpal    MRN: [id number]  : 1995  ADDRESS: [address] [place] [id number]  ACCT. NUMBER:  [id number]  DATE OF SERVICE: 23  START TIME: 1130am   END TIME: 1215pm  PREFERRED PHONE: [phone]  May we leave a program related message: Yes  SERVICE MODALITY:  Video Visit:      Provider verified identity through the following two step process.  Patient provided:  Patient photo and Patient     Telemedicine Visit: The patient's condition can be safely assessed and treated via synchronous audio and visual telemedicine encounter.      Reason for Telemedicine Visit: Patient has requested telehealth visit    Originating Site (Patient Location): Patient's home    Distant Site (Provider Location): [facility]    Consent:  The patient/guardian has verbally consented to: the potential risks and benefits of telemedicine (video visit) versus in person care; bill my insurance or make self-payment for services provided; and responsibility for payment of non-covered services.     Patient would like the video invitation sent by:  Send to e-mail at: [email or web address]    Mode of Communication:  Video Conference via [facility]    Distant Location (Provider):  On-site    As the provider I attest to compliance with applicable laws and regulations related to telemedicine.    UNIVERSAL ADULT Mental Health DIAGNOSTIC ASSESSMENT    Identifying Information:  Patient is a 27 year old,   .  The pronoun use throughout this assessment reflects the patient's chosen pronoun.  Patient was referred for an assessment by self .  Patient attended the session alone.    Patient continues to be enrolled in behavioral health [place] and meets monthly  "with the [facility] for supportive therapy.  This is an updated diagnostic assessment.    Chief Complaint:   The reason for seeking services at this time is: \"depression \".  The problem(s) began ongoing .    Initial clinical information diagnostic assessment dated 9/11/2019 completed by [facility], Dav calvillo  .  Patient is a 23-year-old female with a previous diagnosis of ADHD, depression and Asperger's.  Patient reports she had a formal evaluation at [employer] when she was high school.  A release information was signed and records have been requested.     Patient reports she graduated from [employer] in 2016 but has been continue to work at [employer] for the past 5 years.  Patient expressed fear of changing routines and familiarity.  Patient reports it took a long time to become comfortablee with her coworkers and is fearful to make the changes.  However, patient continues to report negative thoughts about herself and her current life situation.  Patient reports she continues to feel lonely and \"not good enough\".  Patient completed the PHQ 9 with overall score of 8 and indicated passive suicidal thoughts.  Patient reports this response reflects how bad she feels after she has a \"blowup\" with her mother.  Patient reports she lives with her mother and at times he also her which makes her feel \"like a horrible person and I should not be alive\".  Patient recognize she has a hard time reading social cues and recalls in high school always having to ask people what they meant.  Patient reports she still does the same thing and at times her coworkers becomes upset with her with her.  Patient feels she is along better with older adults.     Patient reports she reads about history which is her passion.  Patient reports her ideal job will be working at [employer] in [facility].  However, patient reports history that is too strong of her emotional connection she has to avoid.  For example, patient reports she " cannot watch or read about the holocaust as becomes too emotionally overwhelmed.  Patient clarified the general population believes people with Asperger's do not have empathy but she feels that she is aware around that she is overly empathetic towards others.  Patient reports she is often worrying about situations and that something may go wrong.  Patient reports she is traveling next month to [place] but begins to ruminate about getting through the TSA on time if there is a problem with the flight what the weather could be etc.  Patient reports she is constantly thinking different scenarios in her mind and coming up with solutions.        Patient reports she has OCD tendencies as well.  She reports her  room is organized and like certain routines.  Patient adds that she becomes distressed if she is ever late for appointment or for work.  During the session, patient continued look at her walks as time was important to her.  Patient adds she also thinks of scenarios in her mind and becomes upset in situations change from the way she perceives them to happen.     Patient reports she last met with a therapist when she was in high school.  Patient felt comfortable with the [facility] and appreciate the understanding of Asperger's.    Patient diagnosis of Asperger's continues to be present which impacts her quality of life regarding work and relationship.  Patient's depression and anxiety have improved somewhat but she experiences episodic flareups due to stressors at work or with friends.    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  Acknowledged and validated patient's that transitions are difficult.  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  Patient reports there is not clear direction and guidance provided to her.  Patient reports she eventually quit this job  "and is now applied for [employer].  Patient felt depressed that she is \"I did not stick out\" her job until she found a new 1.  [facility] reflected the patient that this was progress that she stepping out of her comfort zone and focusing on her self-care rather than being consistent and predictable added on happy job.    Patient has she is now reaching out to more activities and social support group.  Patient reports she traveled to to [place] to attend a national conference and then went on to [place] with some friends.    Patient has attempted to resolve these concerns in the past through [facility] and [facility] therapy .    Social/Family History:  Patient reported she grew up in [place], [place]. They were the second born of 3 children.  Patient reported that her childhood was supportive.  Patient reports her parents are  and her father lives in [place].  Patient has minimal contact with her father.  Patient reports she lives at her mother's house.  Patient reports her cousin lives in the basement for the past 4 years as well.  Patient reports she gets along well with her cousin and has her primary support.  Patient reports she comes from a large extended family and has over 9 and 7 uncles.  Patient believes there is a history of depression in her family and recalls that her aunt suicided.     .  Patient reported a history of 0 committed relationships or marriages. Patient has been single for 25 years. Patient reported having 0 children. Patient identified few stable and meaningful social connections.      Patient lives with Her mother's home.  Patient is currently employed full time and reports they are able to function appropriately at work..  Work history patient is work that [employer] the past 5 years.  Patient graduated from [employer] 2 years ago.  Patient recognize she is only 1 at her current work that has a college degree.     Patient reported that she has not been involved with the legal " system.  Patient's highest education level was college graduate. Patient did identify the following learning problems: attention, concentration and speech. There are no ethnic, cultural or Denominational factors that may be relevant for therapy.  Patient did not serve in the .     Patient continue to live at home    Patient's Strengths and Limitations:  Patient identified the following strengths or resources that will help them succeed in treatment: commitment to health and well being, friends / good social support, family support, intelligence, motivation, sense of humor, and work ethic. Things that may interfere with the patient's success in treatment include: financial hardship and lack of social support.     Assessments:  The following assessments were completed by patient for this visit:  PHQ9:       10/5/2021    10:01 AM 2/12/2022     5:35 PM 9/29/2022     1:42 PM 3/24/2023     9:53 AM 4/28/2023     2:30 PM 6/28/2023    10:19 AM 8/2/2023    11:20 AM   PHQ-9 SCORE   PHQ-9 Total Score MyChart  8 (Mild depression) 10 (Moderate depression) 12 (Moderate depression) 7 (Mild depression) 6 (Mild depression) 10 (Moderate depression)   PHQ-9 Total Score 9 8 10 12 7 6 10     GAD7:       6/19/2019    11:04 AM 2/2/2021     9:00 AM 3/23/2021     2:42 PM 3/22/2023    11:26 AM 4/28/2023     2:31 PM 6/28/2023    10:19 AM 8/2/2023    11:21 AM   PALAK-7 SCORE   Total Score 12 (moderate anxiety)   9 (mild anxiety) 10 (moderate anxiety) 9 (mild anxiety) 11 (moderate anxiety)   Total Score 12 14 16 9 10 9 11     CAGE-AID:        No data to display              PROMIS 10-Global Health (only subscores and total score):       3/22/2023    11:27 AM 6/28/2023    10:20 AM   PROMIS-10 Scores Only   Global Mental Health Score 13 10   Global Physical Health Score 17 18   PROMIS TOTAL - SUBSCORES 30 28     Cook Suicide Severity Rating Scale (Lifetime/Recent)      2/22/2019     8:33 AM 9/20/2019    12:35 PM   Cook Suicide Severity  Rating (Lifetime/Recent)   Q1 Wish to be Dead (Lifetime)  Yes   Q1 Wished to be Dead (Past Month) no    Q2 Suicidal Thoughts (Past Month) no    Q6 Suicide Behavior (Lifetime) no    RETIRED: 1. Wish to be Dead (Recent)  No   Actual Attempt (Lifetime)  No   Actual Attempt (Past 3 Months)  No   Has subject engaged in non-suicidal self-injurious behavior? (Lifetime)  Yes   Has subject engaged in non-suicidal self-injurious behavior? (Past 3 Months)  No   Interrupted Attempts (Lifetime)  No   Interrupted Attempts (Past 3 Months)  No   Aborted or Self-Interrupted Attempt (Lifetime)  No   Aborted or Self-Interrupted Attempt (Past 3 Months)  No   Preparatory Acts or Behavior (Lifetime)  No   Preparatory Acts or Behavior (Past 3 Months)  No   Most Recent Attempt Actual Lethality Code  NA   Most Lethal Attempt Actual Lethality Code  NA   Initial/First Attempt Actual Lethality Code  NA     Mental Health History:  Patient could not recall specific remembers but reports a history of depression and extended family members and recalls that on suicided.. Patient has received the following mental health services in the past: counseling and medication(s) from physician / PCP. Patient is not currently receiving any mental health services.        Chemical Health History:  Patient reported no family history of chemical health issues. Patient has not received chemical dependency treatment in the past. Patient is not currently receiving any chemical dependency treatment. Patient reports no problems as a result of their drinking / drug use.        Cage-AID score is: 0 Based on Cage-Aid score and clinical interview there  are not indications of drug or alcohol abuse.       Discussed the general effects of drugs and alcohol on health and well-bein    Personal and Family Medical History:  Patient does report a family history of mental health concerns.  Patient reports family history includes Arthritis in her mother; Diabetes in her maternal  grandmother..       Patient has had a physical exam to rule out medical causes for current symptoms.  Date of last physical exam was within the past year. Symptoms have developed since last physical exam and client was encouraged to follow up with PCP.  . The patient has a [place] Primary Care Provider, who is named Zulema Valdez.  Patient reports the following current medical concerns: See epic .  Patient denies any issues with pain..   There are significant appetite / nutritional concerns / weight changes. These may include: no concerns. Patient reports the following sleep concerns:  No concerns.   Patient does not report a history of head injury / trauma / cognitive impairment.      Patient reports current meds as:   No outpatient medications have been marked as taking for the 8/2/23 encounter (Appointment) with Dav Davis LICSW.       Medication Adherence:  Patient reports taking prescribed medications as prescribed.    Patient Allergies:    Allergies   Allergen Reactions    Bactrim [Sulfamethoxazole W-Trimethoprim] Itching and Swelling     11/27/13 - itching and swelling.       Medical History:    Past Medical History:   Diagnosis Date    ADHD (attention deficit hyperactivity disorder)     has been on concerta, strattera, and daytrana    Asperger's syndrome     Common migraine 4/17/2013    Eczema     Generalised anxiety disorder     Inguinal hernia without mention of obstruction or gangrene, unilateral or unspecified, (not specified as recurrent) 2002    Right    OCD (obsessive compulsive disorder)     was on luvox as a child    Oppositional defiant disorder of childhood or adolescence          Current Mental Status Exam:   Appearance:  Appropriate    Eye Contact:  Fair   Psychomotor:  Retarded (Slowed)       Gait / station:  no problem  Attitude / Demeanor: Cooperative  Interested  Speech      Rate / Production: Normal/ Responsive Monotone       Volume:  Soft  volume       Language:  good  Mood:   Anxious  Sad   Affect:   Flat    Thought Content: Clear   Thought Process: Goal Directed       Associations: No loosening of associations  Insight:   Fair   Judgment:  Intact   Orientation:  All  Attention/concentration: Fair    Substance Use:  Patient reported no family history of chemical health issues.  Patient has not received substance use disorder and/or gambling treatment in the past.  Patient has not ever been to detox.  Patient is not currently receiving any chemical dependency treatment. Patient reports no history of support group attendance.      Significant Losses / Trauma / Abuse / Neglect Issues:  There are no indications or report of: significant losses, trauma, abuse or neglect.     Issues of possible neglect are not present.        Medical History:       Patient Active Problem List   Diagnosis    Obsessive-compulsive disorder    Attention deficit disorder    Eczema    Common migraine    Generalized anxiety disorder    Mild recurrent major depression (H)         Medication Review:  No current outpatient medications on file.      No current facility-administered medications for this visit.          Patient was provided recommendation to follow-up with physician.     Mental Status Assessment:  Appearance:                            Appropriate   Eye Contact:                           Fair   Psychomotor Behavior:          Restless   Attitude:                                   Cooperative   Orientation:                             All  Speech              Rate / Production:       Monotone               Volume:                       Soft   Mood:                                      Anxious   Affect:                                      Flat   Thought Content:                    Clear   Thought Form:                        Coherent  Logical   Insight:                                     Poor         Safety Assessment:     Patient has had a history of suicidal ideation: See above  and Orr and self-injurious behavior: See Orr  Patient reports the following current or recent suicidal ideation or behaviors: See above.  Patient denies current or recent homicidal ideation or behaviors.  Patient denies current or recent self injurious behavior or ideation.  Patient denies other safety concerns.  Patient reports there are no firearms in the house  Protective Factors Sense of responsibility to family, Life Satisfaction, Positive problem-solving skills and Positive social support   Risk Factors Family history of suicide, Implulsivity and Intense emotionality        Orr Suicide Severity Rating Scale (Lifetime/Recent)  Orr Suicide Severity Rating (Lifetime/Recent) 9/20/2019   1. Wish to be Dead (Lifetime) Yes   1. Wish to be Dead (Past Month) No   Actual Attempt (Lifetime) No   Actual Attempt (Past 3 Months) No   Has subject engaged in non-suicidal self-injurious behavior? (Lifetime) Yes   Has subject engaged in non-suicidal self-injurious behavior? (Past 3 Months) No   Interrupted Attempts (Lifetime) No   Interrupted Attempts (Past 3 Months) No   Aborted or Self-Interrupted Attempt (Lifetime) No   Aborted or Self-Interrupted Attempt (Past 3 Months) No   Preparatory Acts or Behavior (Lifetime) No   Preparatory Acts or Behavior (Past 3 Months) No   Most Recent Attempt Actual Lethality Code NA   Most Lethal Attempt Actual Lethality Code NA   Initial/First Attempt Actual Lethality Code NA         Plan for Safety and Risk Management:  A safety and risk management plan has not been developed at this time, however patient was encouraged to call [facility] should there be a change in any of these risk factors.        Review of Symptoms:  Depression:     Sleep Guilt Concentration Psychomotor slowing or agitation Worthless Ruminations  Rosaura:             No symptoms  Psychosis:       No symptoms  Anxiety:           Worries Nervousness  Panic:              No symptoms  Post Traumatic  Stress Disorder:        No symptoms  Obsessive Compulsive Disorder:       Symmetry Rituals\routines  Eating Disorder:          No symptoms  Oppositional Defiant Disorder:           No symptoms  ADD / ADHD:  Attention Organization Interrupts Intrudes  Conduct Disorder:       No symptoms     Patient's Strengths and Limitations:  Patient identified the following strengths or resources that will help him succeed in counseling: exercise routine, insight, intelligence and strong social skills. Patient identified the following supports: family. Things that may interfere with the patien'ts success in behavioral health services include:few friends and lack of social support.     Diagnostic Criteria:  CRITERIA (A-C) REPRESENT A MAJOR DEPRESSIVE EPISODE - SELECT THESE CRITERIA   - Depressed mood. Note: In children and adolescents, can be irritable mood.     - Diminished interest or pleasure in all, or almost all, activities.    - Increased sleep.    - Fatigue or loss of energy.    - Feelings of worthlessness or inappropriate guilt.    - Diminished ability to think or concentrate, or indecisiveness.         Functional Status:  Patient's symptoms have caused reduced functional status in the following areas: Occupational / Vocational -    Social / Relational -          DSM5 Diagnoses: (Sustained by DSM5 Criteria Listed Above)  Diagnoses:  296.32 (F33.1) Major Depressive Disorder, Recurrent Episode, Moderate _ and With anxious distress   History of attention deficit primarily inattentive type, history of diagnosis of Asperger's.  Wrist information has been signed to obtain records from [facility].  Psychosocial & Contextual Factors: Work stress, stage of life, lack of social support system,    Provider Name/ Credentials:  MAGEN Carrillo, [facility]  August 2, 2023    Functional Status:  Patient reports the following functional impairments:  academic performance, educational activities, organization, social interactions, and work /  vocational responsibilities.     Nonprogrammatic care:  Patient is requesting basic services to address current mental health concerns.    Clinical Summary:  1. Reason for assessment: Depression, anxiety, social skills development for diagnoses of Asperger's.  2. Psychosocial, Cultural and Contextual Factors: Work stress, relationship issues.  3. Principal DSM5 Diagnoses  (Sustained by DSM5 Criteria Listed Above):   Autism Spectrum Disorder 299.00(F84.0)  Associated with a known medical or genetic condition or environmental factor.  4. Other Diagnoses that is relevant to services:   296.31 (F33.0) Major Depressive Disorder, Recurrent Episode, Mild _ and With anxious distress  300.02 (F41.1) Generalized Anxiety Disorder.  5. Provisional Diagnosis: None as evidenced by none.  6. Prognosis: Maintain Current Status / Prevent Deterioration.  7. Likely consequences of symptoms if not treated: .  8. Client strengths include:  creative, educated, empathetic, goal-focused, good listener, has a previous history of therapy, insightful, intelligent, motivated, open to suggestions / feedback, supportive, wants to learn, willing to ask questions, and willing to relate to others .     Recommendations:     1. Plan for Safety and Risk Management:   Safety and Risk: Recommended that patient call 911 or go to the local ED should there be a change in any of these risk factors..          Report to child / adult protection services was NA.     2. Patient's identified     .     3. Initial Treatment will focus on:    Anxiety -   .     4. Resources/Service Plan:    services are not indicated.   Modifications to assist communication are not indicated.   Additional disability accommodations are not indicated.      5. Collaboration:   Collaboration / coordination of treatment will be initiated with the following  support professionals: Behavioral Health Clinician (BHC) and behavioral health home .      6.  Referrals:   The following  referral(s) will be initiated:  None at this time . 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  Patient feels she is functional and has the social skills on her own to find a job.      A Release of Information has been obtained for the following:  None .     Emergency Contact   obtained.      Clinical Substantiation/medical necessity for the above recommendations:  .    7. DOMENICO:    DOMENICO:  Discussed the general effects of drugs and alcohol on health and well-being. Provider gave patient printed information about the  effects of chemical use on their health and well being. Recommendations:   .     8. Records:   These were reviewed at time of assessment.   Information in this assessment was obtained from the medical record and  provided by patient who is a good historian.    Patient will have open access to their mental health medical record.    9.   Interactive Complexity: No  .dgmd                         [facility]           Treatment Plan    Client's Name: Lynn Vaca  YOB: 1995 March 24, 2023     DSM5 Diagnoses: (Sustained by DSM5 Criteria Listed Above)  Diagnoses:  296.32 (F33.1) Major Depressive Disorder, Recurrent Episode, Moderate _ and With anxious distress   History of attention deficit primarily inattentive type, history of diagnosis of Asperger's.  Wrist information has been signed to obtain records from [facility].  Psychosocial & Contextual Factors: Work stress, stage of life, lack of social support system,  WHODAS Score: Patient did not complete.  See Media section of [facility] medical record for completed WHODAS    Referral / Collaboration:  Referral to another professional/service is not indicated at this time..    Anticipated number of session or this episode of care: 6-8        MeasurableTreatment Goal(s) related to diagnosis / functional impairment(s)  Goal 1:    -Reduce symptoms of depression and suicidal thinking and  increase life functioning  -effectively reduce depressive symptoms as evidenced by a reduced PHQ9 score of 5 or less with occurrence of several days or less.      Objective #A:  will experience a reduction in depressed mood, will develop more effective coping skills to manage depressive symptoms and will develop healthy cognitive patterns and beliefs   Client will Increase interest, engagement, and pleasure in doing things  Decrease frequency and intensity of feeling down, depressed, hopeless  Identify negative self-talk and behaviors: challenge core beliefs, myths, and actions  Decrease thoughts that you'd be better off dead or of suicide / self-harm.  Status: Continued - Date(s): September 27, 2019      Objective #B:  will increase ability to function adaptively and will continue to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

## 2023-10-23 ENCOUNTER — VIRTUAL VISIT (OUTPATIENT)
Dept: BEHAVIORAL HEALTH | Facility: CLINIC | Age: 28
End: 2023-10-23
Payer: COMMERCIAL

## 2023-10-23 DIAGNOSIS — F84.5 ASPERGER'S DISORDER: Primary | ICD-10-CM

## 2023-10-23 PROCEDURE — 90834 PSYTX W PT 45 MINUTES: CPT | Mod: VID | Performed by: SOCIAL WORKER

## 2023-10-23 ASSESSMENT — ANXIETY QUESTIONNAIRES
4. TROUBLE RELAXING: NOT AT ALL
3. WORRYING TOO MUCH ABOUT DIFFERENT THINGS: NEARLY EVERY DAY
6. BECOMING EASILY ANNOYED OR IRRITABLE: MORE THAN HALF THE DAYS
IF YOU CHECKED OFF ANY PROBLEMS ON THIS QUESTIONNAIRE, HOW DIFFICULT HAVE THESE PROBLEMS MADE IT FOR YOU TO DO YOUR WORK, TAKE CARE OF THINGS AT HOME, OR GET ALONG WITH OTHER PEOPLE: NOT DIFFICULT AT ALL
1. FEELING NERVOUS, ANXIOUS, OR ON EDGE: NEARLY EVERY DAY
2. NOT BEING ABLE TO STOP OR CONTROL WORRYING: NEARLY EVERY DAY
GAD7 TOTAL SCORE: 12
7. FEELING AFRAID AS IF SOMETHING AWFUL MIGHT HAPPEN: SEVERAL DAYS
GAD7 TOTAL SCORE: 12
5. BEING SO RESTLESS THAT IT IS HARD TO SIT STILL: NOT AT ALL
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
## 2023-10-25 ENCOUNTER — OFFICE VISIT (OUTPATIENT)
Dept: PODIATRY | Facility: CLINIC | Age: 28
End: 2023-10-25
Payer: COMMERCIAL

## 2023-10-25 VITALS — DIASTOLIC BLOOD PRESSURE: 78 MMHG | SYSTOLIC BLOOD PRESSURE: 114 MMHG

## 2023-10-25 DIAGNOSIS — L60.0 INGROWING RIGHT GREAT TOENAIL: Primary | ICD-10-CM

## 2023-10-25 DIAGNOSIS — M79.674 PAIN OF TOE OF RIGHT FOOT: ICD-10-CM

## 2023-10-25 PROCEDURE — 11730 AVULSION NAIL PLATE SIMPLE 1: CPT | Mod: T5 | Performed by: PODIATRIST

## 2023-10-25 PROCEDURE — 99203 OFFICE O/P NEW LOW 30 MIN: CPT | Mod: 25 | Performed by: PODIATRIST

## 2023-10-25 NOTE — PROGRESS NOTES
"ASSESSMENT:  Encounter Diagnoses   Name Primary?    Ingrowing right great toenail, medial edge Yes    Pain of toe of right foot    Localized infection    MEDICAL DECISION MAKING:  The potential causes and nature of an ingrown toenail were discussed with the patient.  We reviewed the natural history/prognosis of the condition and potential risks if no treatment is provided.      Treatment options discussed included conservative management (oral antibiotics when coexisting infection, soaking of the foot, the use of a toe spacer, adequate width shoes) as well as surgical management (partial or total nail removal).  The pros and cons of both forms of treatment were reviewed.      I discussed the option of permanent removal via chemical matrixectomy. This is a reasonable option when there is no co-existing infection.     Lynnolivia Vaca elected to a a partial avulsion, medial edge, right hallux nail unit..     Nail Avulsion Procedure  (non permanent removal)    The procedure was discussed with the Lynn CLAIRE Vaca, including risk of infection, abnormal nail regrowth, and possible need for an additional future nail procedure.  Post-procedure home cares were reviewed. I explained that these cares are important for preventing infection and aiding in timely healing.   Verbal and written consent was obtained.   The site was marked and the \"Time Out\" called.     The base of the right great toe was injected with 2 cc of  2% Lidocaine plain.  The toe was then prepped with betadine solution.  A tourniquet was applied around the base of the toe for hemostasis.   Next it was checked for adequate anesthesia.      The medial nail was loosened from the nail bed and marginal soft tissue attachments with a blunt instrument. A nail splitter was then used to make a longitudinal cut 2mm from the medial skin fold.  It was completed, atraumatically, under the eponychium with a [place] blade. Next, the nail edge was firmly grasped with a " "hemostat and removed. The underlying nail bed was inspected and no abnormalities seen.    The tourniquet was removed. Bacitracin ointment was applied to the nail bed, followed by a compressive dressing.  Lynn Vaca tolerated the procedure well.      Lynn Vaca is instructed to watch for, and call if,  increasing redness, drainage, and pain after 2-3 days. Post procedure instructions were provided in the After Visit Summary.     Disclaimer: This note consists of symbols derived from keyboarding, dictation and/or voice recognition software. As a result, there may be errors in the script that have gone undetected. Please consider this when interpreting information found in this chart.    Kirill Ford DPM, FACMELODIE, MS    [place] [facility] Pod[facility] & Ankle [facility]      ____________________________________________________________________    HPI:         Chief Complaint: infected toe, ingrown toenail  Onset of problem: months  Pain/ discomfort is described as:  aching  Pain Ratin/10 at worst   Frequency:  constant    The pain is exacerbated by contact  Previous treatment: \"tried to drain it\"  She is interested in having the offending nail edge removed today.  She works on her feet at [employer]*    Past Medical History:   Diagnosis Date    ADHD (attention deficit hyperactivity disorder)     has been on concerta, strattera, and daytrana    Asperger's syndrome     Common migraine 2013    Eczema     Generalised anxiety disorder     Inguinal hernia without mention of obstruction or gangrene, unilateral or unspecified, (not specified as recurrent)     Right    OCD (obsessive compulsive disorder)     was on luvox as a child    Oppositional defiant disorder of childhood or adolescence    *  *  Past Surgical History:   Procedure Laterality Date    HERNIA REPAIR, INGUINAL RT/LT     *  *  Current Outpatient Medications   Medication Sig Dispense Refill    fluocinonide (LIDEX) 0.05 % external " cream Apply topically 2 times daily as needed for rash on legs.  Do not use for more than 10 days on one area of skin. 30 g 1    venlafaxine (EFFEXOR-XR) 75 MG 24 hr capsule       SUMAtriptan (IMITREX) 25 MG tablet Take 1 tablet (25 mg) by mouth at onset of headache for migraine May repeat in 2 hours. Max 8 tablets/24 hours. (Patient not taking: Reported on 10/25/2023) 18 tablet 1         EXAM:    Vitals: /78   BMI: There is no height or weight on file to calculate BMI.    Constitutional:  Lynn Vaca is in no apparent distress, appears well-nourished.  Cooperative with history and physical exam.    Vascular:  Pedal pulses are palpable for both the DP and PT arteries.  CFT < 3 sec.  No edema.      Neuro: Light touch sensation is intact to the L4, L5, S1 distributions  No evidence of weakness, spasticity, or contracture in the lower extremities.     Derm: Localized erythema, edema, serosanguineous crust and pain along the medial nail unit of the right hallux.

## 2023-10-25 NOTE — PATIENT INSTRUCTIONS
Thank you for choosing [facility]!    DR. GUZMAN'S CLINIC LOCATIONS:     [facility] TRIAGE LINE: [phone]   [address] APPOINTMENTS: [phone]   [place] [address] RADIOLOGY: [phone]   (Every other Tues - Wed - Fri PM) SET UP SURGERY: [phone]    PHYSICAL THERAPY: [phone]   [place] SPECIALTY BILLING QUESTIONS: [phone] [id number] FAX: [phone]   [place] [address]    (Thurs & Fri AM)     [facility]  1. Keep bandage on until that evening or the day after your procedure. If the bandage falls off, start the soaking process.    2. Some bleeding is normal. If bleeding seems excessive to you, place ice on top of your foot for 15-20 minutes and elevate your foot above heart level.    3. Over the counter pain medication (tylenol / ibuprofen), elevating your foot and ice application is all you will need for pain control.    4. If the bandage feels too tight and your toe is throbbing it is ok to remove the bandage and start soaking.     5. For one to two weeks, soak your foot twice a day in mild skin friendly soap (dish or hand soap) and warm water for 15 minutes. It is ok to soak your foot for a few minutes to loosen the dressing applied in the clinic. After soaking, blot dry and apply a regular band aid.    6. It is normal to experience some discomfort and redness around the nail for several days following the procedure. Drainage will likely appear a red - yellow. This is normal. If your toe is still draining a red - yellow fluid after 2 weeks keep continuing to soak foot another few days.    7. Initial discomfort might last for 2-3 days. You may resume with regular activity as soon as you are comfortable, as long as you keep the wound clean and dry and follow the soaking instruction. It is recommended that you do not enter public swimming pools/hot tubs while your toe is draining.    8. If you are  experiencing worsening pain and redness or notice pus after 2-3 days please contact the clinic. Ask to speak with a triage nurse and they will inform our team of your symptoms and we can advise if a follow up is needed.

## 2023-10-25 NOTE — LETTER
"    10/25/2023         RE: Lynn Vaca  [address]  [facility] [address]        Dear Colleague,    Thank you for referring your patient, Lynn Vaca, to the [facility]. Please see a copy of my visit note below.    ASSESSMENT:  Encounter Diagnoses   Name Primary?     Ingrowing right great toenail, medial edge Yes     Pain of toe of right foot    Localized infection    MEDICAL DECISION MAKING:  The potential causes and nature of an ingrown toenail were discussed with the patient.  We reviewed the natural history/prognosis of the condition and potential risks if no treatment is provided.      Treatment options discussed included conservative management (oral antibiotics when coexisting infection, soaking of the foot, the use of a toe spacer, adequate width shoes) as well as surgical management (partial or total nail removal).  The pros and cons of both forms of treatment were reviewed.      I discussed the option of permanent removal via chemical matrixectomy. This is a reasonable option when there is no co-existing infection.     Lynn Vaca elected to a a partial avulsion, medial edge, right hallux nail unit..     Nail Avulsion Procedure  (non permanent removal)    The procedure was discussed with the Lynn Vaca, including risk of infection, abnormal nail regrowth, and possible need for an additional future nail procedure.  Post-procedure home cares were reviewed. I explained that these cares are important for preventing infection and aiding in timely healing.   Verbal and written consent was obtained.   The site was marked and the \"Time Out\" called.     The base of the right great toe was injected with 2 cc of  2% Lidocaine plain.  The toe was then prepped with betadine solution.  A tourniquet was applied around the base of the toe for hemostasis.   Next it was checked for adequate anesthesia.      The medial nail was loosened from the nail bed and " "marginal soft tissue attachments with a blunt instrument. A nail splitter was then used to make a longitudinal cut 2mm from the medial skin fold.  It was completed, atraumatically, under the eponychium with a [place] blade. Next, the nail edge was firmly grasped with a hemostat and removed. The underlying nail bed was inspected and no abnormalities seen.    The tourniquet was removed. Bacitracin ointment was applied to the nail bed, followed by a compressive dressing.  Lynn Vaca tolerated the procedure well.      Lynn CLAIRE Vaca is instructed to watch for, and call if,  increasing redness, drainage, and pain after 2-3 days. Post procedure instructions were provided in the After Visit Summary.     Disclaimer: This note consists of symbols derived from keyboarding, dictation and/or voice recognition software. As a result, there may be errors in the script that have gone undetected. Please consider this when interpreting information found in this chart.    Kirill Ford DPM, FACFAS, MS    [place] [facility] & [facility]le [facility]      ____________________________________________________________________    HPI:         Chief Complaint: infected toe, ingrown toenail  Onset of problem: months  Pain/ discomfort is described as:  aching  Pain Ratin/10 at worst   Frequency:  constant    The pain is exacerbated by contact  Previous treatment: \"tried to drain it\"  She is interested in having the offending nail edge removed today.  She works on her feet at [employer]*    Past Medical History:   Diagnosis Date     ADHD (attention deficit hyperactivity disorder)     has been on concerta, strattera, and daytrana     Asperger's syndrome      Common migraine 2013     Eczema      Generalised anxiety disorder      Inguinal hernia without mention of obstruction or gangrene, unilateral or unspecified, (not specified as recurrent)     Right     OCD (obsessive compulsive disorder)     was on luvox as a child "     Oppositional defiant disorder of childhood or adolescence    *  *  Past Surgical History:   Procedure Laterality Date     HERNIA REPAIR, INGUINAL RT/LT  2002   *  *  Current Outpatient Medications   Medication Sig Dispense Refill     fluocinonide (LIDEX) 0.05 % external cream Apply topically 2 times daily as needed for rash on legs.  Do not use for more than 10 days on one area of skin. 30 g 1     venlafaxine (EFFEXOR-XR) 75 MG 24 hr capsule        SUMAtriptan (IMITREX) 25 MG tablet Take 1 tablet (25 mg) by mouth at onset of headache for migraine May repeat in 2 hours. Max 8 tablets/24 hours. (Patient not taking: Reported on 10/25/2023) 18 tablet 1         EXAM:    Vitals: /78   BMI: There is no height or weight on file to calculate BMI.    Constitutional:  Lynn Vaca is in no apparent distress, appears well-nourished.  Cooperative with history and physical exam.    Vascular:  Pedal pulses are palpable for both the DP and PT arteries.  CFT < 3 sec.  No edema.      Neuro: Light touch sensation is intact to the L4, L5, S1 distributions  No evidence of weakness, spasticity, or contracture in the lower extremities.     Derm: Localized erythema, edema, serosanguineous crust and pain along the medial nail unit of the right hallux.      Again, thank you for allowing me to participate in the care of your patient.        Sincerely,        Kirill Ford DPM

## 2023-11-21 ENCOUNTER — NURSE TRIAGE (OUTPATIENT)
Dept: FAMILY MEDICINE | Facility: CLINIC | Age: 28
End: 2023-11-21
Payer: COMMERCIAL

## 2023-11-21 NOTE — TELEPHONE ENCOUNTER
"Mother calling  Transferred to her daughter    \"I have been having blood in my stools\"  Reason for Disposition    Blood in or on bowel movement is main symptom    MILD rectal bleeding (more than just a few drops or streaks)    Additional Information    Negative: Sounds like a life-threatening emergency to the triager    Negative: Passed out (i.e., fainted, collapsed and was not responding)    Negative: Shock suspected (e.g., cold/pale/clammy skin, too weak to stand, low BP, rapid pulse)    Negative: Vomiting red blood or black (coffee ground) material    Negative: Sounds like a life-threatening emergency to the triager    Negative: Diarrhea is main symptom    Negative: Rectal symptoms    Negative: SEVERE rectal bleeding (large blood clots; constant or on and off bleeding)    Negative: SEVERE dizziness (e.g., unable to stand, requires support to walk, feels like passing out now)    Negative: MODERATE rectal bleeding (small blood clots, passing blood without stool, or toilet water turns red) more than once a day    Negative: Bloody, black, or tarry bowel movements  (Exception: Chronic-unchanged black-grey bowel movements and is taking iron pills or Pepto-Bismol.)    Negative: High-risk adult (e.g., prior surgery on aorta, abdominal aortic aneurysm)    Negative: Rectal foreign body (inserted or swallowed)    Negative: SEVERE abdominal pain (e.g., excruciating)    Negative: Constant abdominal pain lasting > 2 hours    Negative: Pale skin (pallor) of new-onset or worsening    Negative: Patient sounds very sick or weak to the triager    Negative: MODERATE rectal bleeding (small blood clots, passing blood without stool, or toilet water turns red)    Negative: Taking Coumadin (warfarin) or other strong blood thinner, or known bleeding disorder (e.g., thrombocytopenia)    Negative: Colonoscopy in past 72 hours    Negative: Known cirrhosis of the liver (or history of liver failure or ascites)    Negative: Patient wants to be " "seen    Answer Assessment - Initial Assessment Questions  1. SYMPTOM:  \"What's the main symptom you're concerned about?\" (e.g., pain, itching, swelling, rash)      2 days ,blood in stool, bright red blood  2. ONSET: \"When did the bleeding  start?\"      2 times in the last 2 days  3. RECTAL PAIN: \"Do you have any pain around your rectum?\" \"How bad is the pain?\"  (Scale 0-10; or mild, moderate, severe)    - NONE (0): no pain    - MILD (1-3): doesn't interfere with normal activities     - MODERATE (4-7): interferes with normal activities or awakens from sleep, limping     - SEVERE (8-10): excruciating pain, unable to have a bowel movement       no  4. RECTAL ITCHING: \"Do you have any itching in this area?\" \"How bad is the itching?\"  (Scale 0-10; or mild, moderate, severe)    - NONE: no itching    - MILD: doesn't interfere with normal activities     - MODERATE-SEVERE: interferes with normal activities or awakens from sleep      no  5. CONSTIPATION: \"Do you have constipation?\" If Yes, ask: \"How bad is it?\"      Yes, this is recently  6. CAUSE: \"What do you think is causing the anus symptoms?\"      No idea  7. OTHER SYMPTOMS: \"Do you have any other symptoms?\"  (e.g., abdomen pain, fever, rectal bleeding, vomiting)      No , just the bleeding  8. PREGNANCY: \"Is there any chance you are pregnant?\" \"When was your last menstrual period?\"      No chance, LMP one week ago    Protocols used: Rectal Symptoms-A-OH, Rectal Bleeding-A-OH    "

## 2023-11-22 ENCOUNTER — OFFICE VISIT (OUTPATIENT)
Dept: FAMILY MEDICINE | Facility: CLINIC | Age: 28
End: 2023-11-22
Payer: COMMERCIAL

## 2023-11-22 VITALS
SYSTOLIC BLOOD PRESSURE: 123 MMHG | RESPIRATION RATE: 16 BRPM | HEIGHT: 61 IN | BODY MASS INDEX: 26.21 KG/M2 | WEIGHT: 138.8 LBS | DIASTOLIC BLOOD PRESSURE: 78 MMHG | HEART RATE: 78 BPM | OXYGEN SATURATION: 100 % | TEMPERATURE: 97.6 F

## 2023-11-22 DIAGNOSIS — K92.1 HEMATOCHEZIA: Primary | ICD-10-CM

## 2023-11-22 PROCEDURE — 90480 ADMN SARSCOV2 VAC 1/ONLY CMP: CPT | Performed by: NURSE PRACTITIONER

## 2023-11-22 PROCEDURE — 90686 IIV4 VACC NO PRSV 0.5 ML IM: CPT | Performed by: NURSE PRACTITIONER

## 2023-11-22 PROCEDURE — 91320 SARSCV2 VAC 30MCG TRS-SUC IM: CPT | Performed by: NURSE PRACTITIONER

## 2023-11-22 PROCEDURE — 99213 OFFICE O/P EST LOW 20 MIN: CPT | Mod: 25 | Performed by: NURSE PRACTITIONER

## 2023-11-22 PROCEDURE — 90471 IMMUNIZATION ADMIN: CPT | Performed by: NURSE PRACTITIONER

## 2023-11-22 ASSESSMENT — PATIENT HEALTH QUESTIONNAIRE - PHQ9
SUM OF ALL RESPONSES TO PHQ QUESTIONS 1-9: 8
10. IF YOU CHECKED OFF ANY PROBLEMS, HOW DIFFICULT HAVE THESE PROBLEMS MADE IT FOR YOU TO DO YOUR WORK, TAKE CARE OF THINGS AT HOME, OR GET ALONG WITH OTHER PEOPLE: NOT DIFFICULT AT ALL
SUM OF ALL RESPONSES TO PHQ QUESTIONS 1-9: 8

## 2023-11-22 ASSESSMENT — PAIN SCALES - GENERAL: PAINLEVEL: NO PAIN (0)

## 2023-11-22 NOTE — PROGRESS NOTES
"  Assessment & Plan     (K92.1) Hematochezia  (primary encounter diagnosis)  Comment:   Plan: Hemoglobin, Adult GI  Referral -         Procedure Only        Will refer for a colonoscopy.  Discussed increasing fiber and fluids, start Miralax.               BMI:   Estimated body mass index is 25.97 kg/m  as calculated from the following:    Height as of this encounter: 1.557 m (5' 1.3\").    Weight as of this encounter: 63 kg (138 lb 12.8 oz).           Nita Galvan NP  [facility]    Lucas Bailey is a 28 year old, presenting for the following health issues:  Rectal Problem (Bug bites )      11/22/2023     9:58 AM   Additional Questions   Roomed by Pratibha bradford   Accompanied by mother-Carla       History of Present Illness       Reason for visit:  Blood in my stool  Symptom onset:  3-7 days ago  Symptoms include:  Blood in my stool and when I wipe  Symptom intensity:  Mild  Symptom progression:  Staying the same  Had these symptoms before:  No    She eats 0-1 servings of fruits and vegetables daily.She consumes 1 sweetened beverage(s) daily.She exercises with enough effort to increase her heart rate 10 to 19 minutes per day.  She exercises with enough effort to increase her heart rate 7 days per week.   She is taking medications regularly.     She has noticed some blood in her stool for the past three days.  Bright red blood around the stool and a small amount in the toilet water.  No pain with BMs, no abdominal pain, weight loss.  She has been more constipated lately.  Stools are a bit harder.   Cousin with Crohn's, no family history of colon cancer.                   Review of Systems         Objective    /78 (BP Location: Right arm, Patient Position: Sitting, Cuff Size: Adult Regular)   Pulse 78   Temp 97.6  F (36.4  C) (Temporal)   Resp 16   Ht 1.557 m (5' 1.3\")   Wt 63 kg (138 lb 12.8 oz)   LMP 11/01/2023 (Exact Date)   SpO2 100%   BMI 25.97 kg/m  "   Body mass index is 25.97 kg/m .  Physical Exam   GENERAL: healthy, alert and no distress  RESP: lungs clear to auscultation - no rales, rhonchi or wheezes  CV: regular rate and rhythm, normal S1 S2, no S3 or S4, no murmur, click or rub, no peripheral edema and peripheral pulses strong  ABDOMEN: soft, nontender, no hepatosplenomegaly, no masses and bowel sounds normal  RECTAL (female): normal sphincter tone, no fissure or external hemorrhoids noted, no rectal masses  PSYCH: mentation appears normal, affect normal/bright

## 2023-12-27 ENCOUNTER — APPOINTMENT (OUTPATIENT)
Dept: GENERAL RADIOLOGY | Facility: CLINIC | Age: 28
End: 2023-12-27
Attending: FAMILY MEDICINE
Payer: COMMERCIAL

## 2023-12-27 ENCOUNTER — HOSPITAL ENCOUNTER (OUTPATIENT)
Facility: CLINIC | Age: 28
Setting detail: OBSERVATION
Discharge: HOME | End: 2023-12-28
Attending: FAMILY MEDICINE | Admitting: INTERNAL MEDICINE
Payer: COMMERCIAL

## 2023-12-27 ENCOUNTER — NURSE TRIAGE (OUTPATIENT)
Dept: FAMILY MEDICINE | Facility: CLINIC | Age: 28
End: 2023-12-27
Payer: COMMERCIAL

## 2023-12-27 DIAGNOSIS — R11.0 NAUSEA: ICD-10-CM

## 2023-12-27 DIAGNOSIS — F84.5 ASPERGER'S DISORDER: ICD-10-CM

## 2023-12-27 DIAGNOSIS — R04.2 HEMOPTYSIS: ICD-10-CM

## 2023-12-27 DIAGNOSIS — D64.9 ANEMIA, UNSPECIFIED TYPE: ICD-10-CM

## 2023-12-27 DIAGNOSIS — D50.0 IRON DEFICIENCY ANEMIA DUE TO CHRONIC BLOOD LOSS: Primary | ICD-10-CM

## 2023-12-27 DIAGNOSIS — J02.0 STREP PHARYNGITIS: ICD-10-CM

## 2023-12-27 DIAGNOSIS — U07.1 COVID-19 VIRUS INFECTION: ICD-10-CM

## 2023-12-27 LAB
ABO/RH(D): NORMAL
ACANTHOCYTES BLD QL SMEAR: ABNORMAL
ALBUMIN SERPL BCG-MCNC: 4 G/DL (ref 3.5–5.2)
ALBUMIN UR-MCNC: 10 MG/DL
ALP SERPL-CCNC: 89 U/L (ref 40–150)
ALT SERPL W P-5'-P-CCNC: 21 U/L (ref 0–50)
ANION GAP SERPL CALCULATED.3IONS-SCNC: 15 MMOL/L (ref 7–15)
ANTIBODY SCREEN: NEGATIVE
APPEARANCE UR: CLEAR
AST SERPL W P-5'-P-CCNC: 19 U/L (ref 0–45)
AUER BODIES BLD QL SMEAR: ABNORMAL
BASO STIPL BLD QL SMEAR: ABNORMAL
BASOPHILS # BLD AUTO: 0 10E3/UL (ref 0–0.2)
BASOPHILS # BLD AUTO: 0.1 10E3/UL (ref 0–0.2)
BASOPHILS NFR BLD AUTO: 0 %
BASOPHILS NFR BLD AUTO: 0 %
BILIRUB SERPL-MCNC: 0.3 MG/DL
BILIRUB UR QL STRIP: NEGATIVE
BITE CELLS BLD QL SMEAR: ABNORMAL
BLISTER CELLS BLD QL SMEAR: ABNORMAL
BUN SERPL-MCNC: 10.6 MG/DL (ref 6–20)
BURR CELLS BLD QL SMEAR: ABNORMAL
CALCIUM SERPL-MCNC: 8.9 MG/DL (ref 8.6–10)
CHLORIDE SERPL-SCNC: 101 MMOL/L (ref 98–107)
COLOR UR AUTO: ABNORMAL
CREAT SERPL-MCNC: 0.68 MG/DL (ref 0.51–0.95)
DACRYOCYTES BLD QL SMEAR: ABNORMAL
DEPRECATED HCO3 PLAS-SCNC: 21 MMOL/L (ref 22–29)
EGFRCR SERPLBLD CKD-EPI 2021: >90 ML/MIN/1.73M2
ELLIPTOCYTES BLD QL SMEAR: SLIGHT
EOSINOPHIL # BLD AUTO: 0.1 10E3/UL (ref 0–0.7)
EOSINOPHIL # BLD AUTO: 0.1 10E3/UL (ref 0–0.7)
EOSINOPHIL NFR BLD AUTO: 0 %
EOSINOPHIL NFR BLD AUTO: 0 %
ERYTHROCYTE [DISTWIDTH] IN BLOOD BY AUTOMATED COUNT: 17.9 % (ref 10–15)
ERYTHROCYTE [DISTWIDTH] IN BLOOD BY AUTOMATED COUNT: 18.2 % (ref 10–15)
FERRITIN SERPL-MCNC: 22 NG/ML (ref 6–175)
FLUAV RNA SPEC QL NAA+PROBE: NEGATIVE
FLUBV RNA RESP QL NAA+PROBE: NEGATIVE
FOLATE SERPL-MCNC: 11.3 NG/ML (ref 4.6–34.8)
FRAGMENTS BLD QL SMEAR: ABNORMAL
GLUCOSE SERPL-MCNC: 93 MG/DL (ref 70–99)
GLUCOSE UR STRIP-MCNC: NEGATIVE MG/DL
GROUP A STREP BY PCR: DETECTED
HAPTOGLOB SERPL-MCNC: 153 MG/DL (ref 30–200)
HCG UR QL: NEGATIVE
HCT VFR BLD AUTO: 26.7 % (ref 35–47)
HCT VFR BLD AUTO: 29.3 % (ref 35–47)
HGB BLD-MCNC: 7.4 G/DL (ref 11.7–15.7)
HGB BLD-MCNC: 8.2 G/DL (ref 11.7–15.7)
HGB C CRYSTALS: ABNORMAL
HGB UR QL STRIP: NEGATIVE
HOLD SPECIMEN: NORMAL
HOWELL-JOLLY BOD BLD QL SMEAR: ABNORMAL
IMM GRANULOCYTES # BLD: 0.1 10E3/UL
IMM GRANULOCYTES # BLD: 0.1 10E3/UL
IMM GRANULOCYTES NFR BLD: 1 %
IMM GRANULOCYTES NFR BLD: 1 %
INTERNAL QC OK POCT: NORMAL
IRON BINDING CAPACITY (ROCHE): 413 UG/DL (ref 240–430)
IRON SATN MFR SERPL: 2 % (ref 15–46)
IRON SERPL-MCNC: 8 UG/DL (ref 37–145)
KETONES UR STRIP-MCNC: 100 MG/DL
LDH SERPL L TO P-CCNC: 164 U/L (ref 0–250)
LEUKOCYTE ESTERASE UR QL STRIP: NEGATIVE
LYMPHOCYTES # BLD AUTO: 0.6 10E3/UL (ref 0.8–5.3)
LYMPHOCYTES # BLD AUTO: 0.6 10E3/UL (ref 0.8–5.3)
LYMPHOCYTES NFR BLD AUTO: 3 %
LYMPHOCYTES NFR BLD AUTO: 4 %
MCH RBC QN AUTO: 16.4 PG (ref 26.5–33)
MCH RBC QN AUTO: 16.6 PG (ref 26.5–33)
MCHC RBC AUTO-ENTMCNC: 27.7 G/DL (ref 31.5–36.5)
MCHC RBC AUTO-ENTMCNC: 28 G/DL (ref 31.5–36.5)
MCV RBC AUTO: 59 FL (ref 78–100)
MCV RBC AUTO: 59 FL (ref 78–100)
MONOCYTES # BLD AUTO: 1 10E3/UL (ref 0–1.3)
MONOCYTES # BLD AUTO: 1.1 10E3/UL (ref 0–1.3)
MONOCYTES NFR BLD AUTO: 6 %
MONOCYTES NFR BLD AUTO: 6 %
MUCOUS THREADS #/AREA URNS LPF: PRESENT /LPF
NEUTROPHILS # BLD AUTO: 14.4 10E3/UL (ref 1.6–8.3)
NEUTROPHILS # BLD AUTO: 16.9 10E3/UL (ref 1.6–8.3)
NEUTROPHILS NFR BLD AUTO: 89 %
NEUTROPHILS NFR BLD AUTO: 90 %
NEUTS HYPERSEG BLD QL SMEAR: ABNORMAL
NITRATE UR QL: NEGATIVE
NRBC # BLD AUTO: 0 10E3/UL
NRBC # BLD AUTO: 0 10E3/UL
NRBC BLD AUTO-RTO: 0 /100
NRBC BLD AUTO-RTO: 0 /100
PH UR STRIP: 5.5 [PH] (ref 5–7)
PLAT MORPH BLD: ABNORMAL
PLATELET # BLD AUTO: 289 10E3/UL (ref 150–450)
PLATELET # BLD AUTO: 308 10E3/UL (ref 150–450)
POCT KIT EXPIRATION DATE: NORMAL
POCT KIT LOT NUMBER: NORMAL
POLYCHROMASIA BLD QL SMEAR: SLIGHT
POTASSIUM SERPL-SCNC: 3.4 MMOL/L (ref 3.4–5.3)
PROT SERPL-MCNC: 7 G/DL (ref 6.4–8.3)
RBC # BLD AUTO: 4.52 10E6/UL (ref 3.8–5.2)
RBC # BLD AUTO: 4.95 10E6/UL (ref 3.8–5.2)
RBC AGGLUT BLD QL: ABNORMAL
RBC MORPH BLD: ABNORMAL
RBC URINE: <1 /HPF
RETICS # AUTO: 0.07 10E6/UL (ref 0.03–0.1)
RETICS # AUTO: 0.07 10E6/UL (ref 0.03–0.1)
RETICS/RBC NFR AUTO: 1.4 % (ref 0.5–2)
RETICS/RBC NFR AUTO: 1.5 % (ref 0.5–2)
ROULEAUX BLD QL SMEAR: ABNORMAL
RSV RNA SPEC NAA+PROBE: NEGATIVE
SARS-COV-2 RNA RESP QL NAA+PROBE: POSITIVE
SICKLE CELLS BLD QL SMEAR: ABNORMAL
SMUDGE CELLS BLD QL SMEAR: ABNORMAL
SODIUM SERPL-SCNC: 137 MMOL/L (ref 135–145)
SP GR UR STRIP: 1.02 (ref 1–1.03)
SPECIMEN EXPIRATION DATE: NORMAL
SPHEROCYTES BLD QL SMEAR: ABNORMAL
SQUAMOUS EPITHELIAL: 2 /HPF
STOMATOCYTES BLD QL SMEAR: ABNORMAL
TARGETS BLD QL SMEAR: ABNORMAL
TOXIC GRANULES BLD QL SMEAR: ABNORMAL
UROBILINOGEN UR STRIP-MCNC: NORMAL MG/DL
VARIANT LYMPHS BLD QL SMEAR: ABNORMAL
VIT B12 SERPL-MCNC: 741 PG/ML (ref 232–1245)
WBC # BLD AUTO: 16.2 10E3/UL (ref 4–11)
WBC # BLD AUTO: 18.8 10E3/UL (ref 4–11)
WBC URINE: 4 /HPF

## 2023-12-27 PROCEDURE — 85025 COMPLETE CBC W/AUTO DIFF WBC: CPT | Performed by: FAMILY MEDICINE

## 2023-12-27 PROCEDURE — 85025 COMPLETE CBC W/AUTO DIFF WBC: CPT | Performed by: INTERNAL MEDICINE

## 2023-12-27 PROCEDURE — 258N000003 HC RX IP 258 OP 636: Performed by: FAMILY MEDICINE

## 2023-12-27 PROCEDURE — G0378 HOSPITAL OBSERVATION PER HR: HCPCS

## 2023-12-27 PROCEDURE — 96374 THER/PROPH/DIAG INJ IV PUSH: CPT | Performed by: FAMILY MEDICINE

## 2023-12-27 PROCEDURE — 83615 LACTATE (LD) (LDH) ENZYME: CPT | Performed by: INTERNAL MEDICINE

## 2023-12-27 PROCEDURE — 250N000011 HC RX IP 250 OP 636: Performed by: FAMILY MEDICINE

## 2023-12-27 PROCEDURE — 86900 BLOOD TYPING SEROLOGIC ABO: CPT | Performed by: FAMILY MEDICINE

## 2023-12-27 PROCEDURE — 80053 COMPREHEN METABOLIC PANEL: CPT | Performed by: FAMILY MEDICINE

## 2023-12-27 PROCEDURE — 82728 ASSAY OF FERRITIN: CPT | Performed by: FAMILY MEDICINE

## 2023-12-27 PROCEDURE — 82746 ASSAY OF FOLIC ACID SERUM: CPT | Performed by: INTERNAL MEDICINE

## 2023-12-27 PROCEDURE — 81025 URINE PREGNANCY TEST: CPT | Performed by: FAMILY MEDICINE

## 2023-12-27 PROCEDURE — 81001 URINALYSIS AUTO W/SCOPE: CPT | Performed by: FAMILY MEDICINE

## 2023-12-27 PROCEDURE — 36415 COLL VENOUS BLD VENIPUNCTURE: CPT | Performed by: FAMILY MEDICINE

## 2023-12-27 PROCEDURE — 99285 EMERGENCY DEPT VISIT HI MDM: CPT | Mod: 25 | Performed by: FAMILY MEDICINE

## 2023-12-27 PROCEDURE — 99222 1ST HOSP IP/OBS MODERATE 55: CPT | Performed by: INTERNAL MEDICINE

## 2023-12-27 PROCEDURE — 83010 ASSAY OF HAPTOGLOBIN QUANT: CPT | Performed by: INTERNAL MEDICINE

## 2023-12-27 PROCEDURE — 87086 URINE CULTURE/COLONY COUNT: CPT | Performed by: FAMILY MEDICINE

## 2023-12-27 PROCEDURE — 250N000013 HC RX MED GY IP 250 OP 250 PS 637: Performed by: INTERNAL MEDICINE

## 2023-12-27 PROCEDURE — 85045 AUTOMATED RETICULOCYTE COUNT: CPT | Performed by: INTERNAL MEDICINE

## 2023-12-27 PROCEDURE — 82607 VITAMIN B-12: CPT | Performed by: INTERNAL MEDICINE

## 2023-12-27 PROCEDURE — 250N000013 HC RX MED GY IP 250 OP 250 PS 637: Performed by: STUDENT IN AN ORGANIZED HEALTH CARE EDUCATION/TRAINING PROGRAM

## 2023-12-27 PROCEDURE — 87637 SARSCOV2&INF A&B&RSV AMP PRB: CPT | Performed by: FAMILY MEDICINE

## 2023-12-27 PROCEDURE — 96375 TX/PRO/DX INJ NEW DRUG ADDON: CPT | Performed by: FAMILY MEDICINE

## 2023-12-27 PROCEDURE — 87651 STREP A DNA AMP PROBE: CPT | Performed by: FAMILY MEDICINE

## 2023-12-27 PROCEDURE — 83550 IRON BINDING TEST: CPT | Performed by: FAMILY MEDICINE

## 2023-12-27 PROCEDURE — 99285 EMERGENCY DEPT VISIT HI MDM: CPT | Performed by: FAMILY MEDICINE

## 2023-12-27 PROCEDURE — 36415 COLL VENOUS BLD VENIPUNCTURE: CPT | Performed by: INTERNAL MEDICINE

## 2023-12-27 PROCEDURE — 250N000013 HC RX MED GY IP 250 OP 250 PS 637: Performed by: FAMILY MEDICINE

## 2023-12-27 PROCEDURE — 85060 BLOOD SMEAR INTERPRETATION: CPT | Mod: GC | Performed by: PATHOLOGY

## 2023-12-27 PROCEDURE — 96361 HYDRATE IV INFUSION ADD-ON: CPT | Performed by: FAMILY MEDICINE

## 2023-12-27 PROCEDURE — 87040 BLOOD CULTURE FOR BACTERIA: CPT | Performed by: FAMILY MEDICINE

## 2023-12-27 PROCEDURE — 96365 THER/PROPH/DIAG IV INF INIT: CPT

## 2023-12-27 PROCEDURE — 71046 X-RAY EXAM CHEST 2 VIEWS: CPT

## 2023-12-27 RX ORDER — VENLAFAXINE HYDROCHLORIDE 75 MG/1
150 CAPSULE, EXTENDED RELEASE ORAL DAILY
Status: DISCONTINUED | OUTPATIENT
Start: 2023-12-28 | End: 2023-12-28 | Stop reason: HOSPADM

## 2023-12-27 RX ORDER — PENICILLIN V POTASSIUM 250 MG/1
250 TABLET, FILM COATED ORAL EVERY 6 HOURS SCHEDULED
Status: DISCONTINUED | OUTPATIENT
Start: 2023-12-28 | End: 2023-12-28

## 2023-12-27 RX ORDER — CEFTRIAXONE 1 G/1
1 INJECTION, POWDER, FOR SOLUTION INTRAMUSCULAR; INTRAVENOUS ONCE
Status: COMPLETED | OUTPATIENT
Start: 2023-12-27 | End: 2023-12-27

## 2023-12-27 RX ORDER — ONDANSETRON 4 MG/1
4 TABLET, ORALLY DISINTEGRATING ORAL EVERY 6 HOURS PRN
Status: DISCONTINUED | OUTPATIENT
Start: 2023-12-27 | End: 2023-12-28 | Stop reason: HOSPADM

## 2023-12-27 RX ORDER — AMOXICILLIN 250 MG
2 CAPSULE ORAL 2 TIMES DAILY PRN
Status: DISCONTINUED | OUTPATIENT
Start: 2023-12-27 | End: 2023-12-28 | Stop reason: HOSPADM

## 2023-12-27 RX ORDER — FERROUS GLUCONATE 324(38)MG
324 TABLET ORAL
Status: DISCONTINUED | OUTPATIENT
Start: 2023-12-28 | End: 2023-12-28 | Stop reason: HOSPADM

## 2023-12-27 RX ORDER — VENLAFAXINE HYDROCHLORIDE 150 MG/1
150 CAPSULE, EXTENDED RELEASE ORAL DAILY
COMMUNITY
Start: 2023-11-14

## 2023-12-27 RX ORDER — LANOLIN ALCOHOL/MO/W.PET/CERES
3 CREAM (GRAM) TOPICAL
Status: DISCONTINUED | OUTPATIENT
Start: 2023-12-27 | End: 2023-12-28 | Stop reason: HOSPADM

## 2023-12-27 RX ORDER — ACETAMINOPHEN 325 MG/1
975 TABLET ORAL EVERY 6 HOURS PRN
Status: DISCONTINUED | OUTPATIENT
Start: 2023-12-27 | End: 2023-12-28 | Stop reason: HOSPADM

## 2023-12-27 RX ORDER — AMOXICILLIN 250 MG
1 CAPSULE ORAL 2 TIMES DAILY PRN
Status: DISCONTINUED | OUTPATIENT
Start: 2023-12-27 | End: 2023-12-28 | Stop reason: HOSPADM

## 2023-12-27 RX ORDER — PENICILLIN V POTASSIUM 250 MG/1
250 TABLET, FILM COATED ORAL EVERY 6 HOURS SCHEDULED
Status: DISCONTINUED | OUTPATIENT
Start: 2023-12-27 | End: 2023-12-27

## 2023-12-27 RX ORDER — ACETAMINOPHEN 500 MG
1000 TABLET ORAL ONCE
Status: COMPLETED | OUTPATIENT
Start: 2023-12-27 | End: 2023-12-27

## 2023-12-27 RX ORDER — ONDANSETRON 2 MG/ML
4 INJECTION INTRAMUSCULAR; INTRAVENOUS ONCE
Status: COMPLETED | OUTPATIENT
Start: 2023-12-27 | End: 2023-12-27

## 2023-12-27 RX ORDER — ONDANSETRON 2 MG/ML
4 INJECTION INTRAMUSCULAR; INTRAVENOUS EVERY 6 HOURS PRN
Status: DISCONTINUED | OUTPATIENT
Start: 2023-12-27 | End: 2023-12-28 | Stop reason: HOSPADM

## 2023-12-27 RX ADMIN — Medication 3 MG: at 23:39

## 2023-12-27 RX ADMIN — PENICILLIN V POTASSIUM 250 MG: 250 TABLET, FILM COATED ORAL at 23:39

## 2023-12-27 RX ADMIN — Medication 1 LOZENGE: at 21:16

## 2023-12-27 RX ADMIN — ACETAMINOPHEN 975 MG: 325 TABLET, FILM COATED ORAL at 20:07

## 2023-12-27 RX ADMIN — CEFTRIAXONE SODIUM 1 G: 1 INJECTION, POWDER, FOR SOLUTION INTRAMUSCULAR; INTRAVENOUS at 13:23

## 2023-12-27 RX ADMIN — ONDANSETRON 4 MG: 2 INJECTION INTRAMUSCULAR; INTRAVENOUS at 11:23

## 2023-12-27 RX ADMIN — ACETAMINOPHEN 1000 MG: 500 TABLET ORAL at 13:25

## 2023-12-27 RX ADMIN — SODIUM CHLORIDE 1000 ML: 9 INJECTION, SOLUTION INTRAVENOUS at 10:10

## 2023-12-27 RX ADMIN — Medication 1 LOZENGE: at 18:07

## 2023-12-27 ASSESSMENT — ACTIVITIES OF DAILY LIVING (ADL)
ADLS_ACUITY_SCORE: 35

## 2023-12-27 NOTE — ED PROVIDER NOTES
ED Provider Note  [facility]      History     Chief Complaint   Patient presents with    Vomiting     Pt reports vomiting since last night, She also reports having blood in her emesis    Pharyngitis     Sore throat since Karel     The history is provided by the patient and medical records.     Lynn Vaca is a 28 year old female with a history of Asperger's syndrome, depression, anxiety, and eczema presents to the ED for vomiting and pharyngitis.  Patient notes that she had several emesis today that had blood streaks throughout much of it she states that there was increased amounts of blood and she was concerned about the blood that was present with both vomiting and coughing.  Patient also notes that last week she had blood in her stools and feels as though this has resolved on its own.    Past Medical History  Past Medical History:   Diagnosis Date    ADHD (attention deficit hyperactivity disorder)     has been on concerta, strattera, and daytrana    Asperger's syndrome     Common migraine 4/17/2013    Eczema     Generalised anxiety disorder     Inguinal hernia without mention of obstruction or gangrene, unilateral or unspecified, (not specified as recurrent) 2002    Right    OCD (obsessive compulsive disorder)     was on luvox as a child    Oppositional defiant disorder of childhood or adolescence      Past Surgical History:   Procedure Laterality Date    HERNIA REPAIR, INGUINAL RT/LT  2002     venlafaxine (EFFEXOR XR) 150 MG 24 hr capsule      Allergies   Allergen Reactions    Bactrim [Sulfamethoxazole W-Trimethoprim] Itching and Swelling     11/27/13 - itching and swelling.     Family History  Family History   Problem Relation Age of Onset    Arthritis Mother         at age 22    Diabetes Maternal Grandmother         type II     Social History   Social History     Tobacco Use    Smoking status: Never    Smokeless tobacco: Never   Vaping Use    Vaping Use: Never used  "  Substance Use Topics    Alcohol use: No    Drug use: No         A medically appropriate review of systems was performed with pertinent positives and negatives noted in the HPI, and all other systems negative.    Physical Exam   BP: 115/81  Pulse: 104  Temp: 98.5  F (36.9  C)  Resp: 16  Height: 154.9 cm (5' 1\")  Weight: 62.1 kg (137 lb)  SpO2: 97 %  Physical Exam  Constitutional:       General: She is not in acute distress.     Appearance: Normal appearance. She is not diaphoretic.   HENT:      Head: Atraumatic.      Mouth/Throat:      Mouth: Mucous membranes are moist.      Pharynx: Posterior oropharyngeal erythema present.      Tonsils: No tonsillar exudate.   Eyes:      General: No scleral icterus.     Conjunctiva/sclera: Conjunctivae normal.   Cardiovascular:      Rate and Rhythm: Normal rate.      Heart sounds: Normal heart sounds.   Pulmonary:      Effort: No respiratory distress.      Breath sounds: Normal breath sounds.   Abdominal:      General: Abdomen is flat. There is no distension.      Tenderness: There is no abdominal tenderness.   Musculoskeletal:      Cervical back: Neck supple.   Skin:     General: Skin is warm.      Findings: No rash.   Neurological:      General: No focal deficit present.      Mental Status: She is alert and oriented to person, place, and time.      Sensory: No sensory deficit.      Motor: No weakness.      Coordination: Coordination normal.           ED Course, Procedures, & Data      Procedures       Results for orders placed or performed during the hospital encounter of 12/27/23   XR Chest 2 Views     Status: None    Narrative    XR CHEST 2 VIEWS 12/27/2023 11:20 AM    HISTORY: cough with blood    COMPARISON: None.      Impression    IMPRESSION: No infiltrate, pleural effusion or pneumothorax. The  cardiac and mediastinal silhouettes are normal.    DENIS DESOUZA MD         SYSTEM ID:  [id number]   Comprehensive metabolic panel     Status: Abnormal   Result Value Ref Range    " Sodium 137 135 - 145 mmol/L    Potassium 3.4 3.4 - 5.3 mmol/L    Carbon Dioxide (CO2) 21 (L) 22 - 29 mmol/L    Anion Gap 15 7 - 15 mmol/L    Urea Nitrogen 10.6 6.0 - 20.0 mg/dL    Creatinine 0.68 0.51 - 0.95 mg/dL    GFR Estimate >90 >60 mL/min/1.73m2    Calcium 8.9 8.6 - 10.0 mg/dL    Chloride 101 98 - 107 mmol/L    Glucose 93 70 - 99 mg/dL    Alkaline Phosphatase 89 40 - 150 U/L    AST 19 0 - 45 U/L    ALT 21 0 - 50 U/L    Protein Total 7.0 6.4 - 8.3 g/dL    Albumin 4.0 3.5 - 5.2 g/dL    Bilirubin Total 0.3 <=1.2 mg/dL   Symptomatic Influenza A/B, RSV, & SARS-CoV2 PCR (COVID-19) Nasopharyngeal     Status: Abnormal    Specimen: Nasopharyngeal; Swab   Result Value Ref Range    Influenza A PCR Negative Negative    Influenza B PCR Negative Negative    RSV PCR Negative Negative    SARS CoV2 PCR Positive (A) Negative    Narrative    Testing was performed using the Xpert Xpress CoV2/Flu/RSV Assay on the [employer] GeneXpert Instrument. This test should be ordered for the detection of SARS-CoV-2, influenza, and RSV viruses in individuals who meet clinical and/or epidemiological criteria. Test performance is unknown in asymptomatic patients. This test is for in vitro diagnostic use under the FDA EUA for laboratories certified under CLIA to perform high or moderate complexity testing. This test has not been FDA cleared or approved. A negative result does not rule out the presence of PCR inhibitors in the specimen or target RNA in concentration below the limit of detection for the assay. If only one viral target is positive but coinfection with multiple targets is suspected, the sample should be re-tested with another FDA cleared, approved, or authorized test, if coinfection would change clinical management. This test was validated by the [facility]. These laboratories are certified under the Clinical Laboratory Improvement Amendments of 1988 (CLIA-88) as qualified to perform high complexity laboratory  testing.   CBC with platelets and differential     Status: Abnormal   Result Value Ref Range    WBC Count 18.8 (H) 4.0 - 11.0 10e3/uL    RBC Count 4.95 3.80 - 5.20 10e6/uL    Hemoglobin 8.2 (L) 11.7 - 15.7 g/dL    Hematocrit 29.3 (L) 35.0 - 47.0 %    MCV 59 (L) 78 - 100 fL    MCH 16.6 (L) 26.5 - 33.0 pg    MCHC 28.0 (L) 31.5 - 36.5 g/dL    RDW 18.2 (H) 10.0 - 15.0 %    Platelet Count 308 150 - 450 10e3/uL    % Neutrophils 90 %    % Lymphocytes 3 %    % Monocytes 6 %    % Eosinophils 0 %    % Basophils 0 %    % Immature Granulocytes 1 %    NRBCs per 100 WBC 0 <1 /100    Absolute Neutrophils 16.9 (H) 1.6 - 8.3 10e3/uL    Absolute Lymphocytes 0.6 (L) 0.8 - 5.3 10e3/uL    Absolute Monocytes 1.1 0.0 - 1.3 10e3/uL    Absolute Eosinophils 0.1 0.0 - 0.7 10e3/uL    Absolute Basophils 0.1 0.0 - 0.2 10e3/uL    Absolute Immature Granulocytes 0.1 <=0.4 10e3/uL    Absolute NRBCs 0.0 10e3/uL   Ferritin     Status: Normal   Result Value Ref Range    Ferritin 22 6 - 175 ng/mL   Iron and iron binding capacity     Status: Abnormal   Result Value Ref Range    Iron 8 (L) 37 - 145 ug/dL    Iron Binding Capacity 413 240 - 430 ug/dL    Iron Sat Index 2 (L) 15 - 46 %   hCG qual urine POCT     Status: Normal   Result Value Ref Range    HCG Qual Urine Negative Negative    Internal QC Check POCT Valid Valid    POCT Kit Lot Number [id number]     POCT Kit Expiration Date 2025-06-26    Adult Type and Screen     Status: None   Result Value Ref Range    ABO/RH(D) A POS     Antibody Screen Negative Negative    SPECIMEN EXPIRATION DATE [id number]    Group A Streptococcus PCR Throat Swab     Status: Abnormal    Specimen: Throat; Swab   Result Value Ref Range    Group A strep by PCR Detected (A) Not Detected    Narrative    The Xpert Xpress Strep A test, performed on the [employer], is a rapid, qualitative in vitro diagnostic test for the detection of Streptococcus pyogenes (Group A ß-hemolytic Streptococcus, Strep A) in throat swab  specimens from patients with signs and symptoms of pharyngitis. The Xpert Xpress Strep A test can be used as an aid in the diagnosis of Group A Streptococcal pharyngitis. The assay is not intended to monitor treatment for Group A Streptococcus infections. The Xpert Xpress Strep A test utilizes an automated real-time polymerase chain reaction (PCR) to detect Streptococcus pyogenes DNA.   CBC with platelets differential     Status: Abnormal    Narrative    The following orders were created for panel order CBC with platelets differential.  Procedure                               Abnormality         Status                     ---------                               -----------         ------                     CBC with platelets and d...[[id number]]  Abnormal            Final result                 Please view results for these tests on the individual orders.   ABO/Rh type and screen     Status: None    Narrative    The following orders were created for panel order ABO/Rh type and screen.  Procedure                               Abnormality         Status                     ---------                               -----------         ------                     Adult Type and Screen[[id number]]                            Final result                 Please view results for these tests on the individual orders.     Medications   cefTRIAXone (ROCEPHIN) 1 g vial to attach to  mL bag for ADULTS or NS 50 mL bag for PEDS (1 g Intravenous $New Bag 12/27/23 1323)   sodium chloride 0.9% BOLUS 1,000 mL (0 mLs Intravenous Stopped 12/27/23 1110)   ondansetron (ZOFRAN) injection 4 mg (4 mg Intravenous $Given 12/27/23 1123)   acetaminophen (TYLENOL) tablet 1,000 mg (1,000 mg Oral $Given 12/27/23 1325)     Labs Ordered and Resulted from Time of ED Arrival to Time of ED Departure   COMPREHENSIVE METABOLIC PANEL - Abnormal       Result Value    Sodium 137      Potassium 3.4      Carbon Dioxide (CO2) 21 (*)     Anion Gap 15      Urea  Nitrogen 10.6      Creatinine 0.68      GFR Estimate >90      Calcium 8.9      Chloride 101      Glucose 93      Alkaline Phosphatase 89      AST 19      ALT 21      Protein Total 7.0      Albumin 4.0      Bilirubin Total 0.3     INFLUENZA A/B, RSV, & SARS-COV2 PCR - Abnormal    Influenza A PCR Negative      Influenza B PCR Negative      RSV PCR Negative      SARS CoV2 PCR Positive (*)    CBC WITH PLATELETS AND DIFFERENTIAL - Abnormal    WBC Count 18.8 (*)     RBC Count 4.95      Hemoglobin 8.2 (*)     Hematocrit 29.3 (*)     MCV 59 (*)     MCH 16.6 (*)     MCHC 28.0 (*)     RDW 18.2 (*)     Platelet Count 308      % Neutrophils 90      % Lymphocytes 3      % Monocytes 6      % Eosinophils 0      % Basophils 0      % Immature Granulocytes 1      NRBCs per 100 WBC 0      Absolute Neutrophils 16.9 (*)     Absolute Lymphocytes 0.6 (*)     Absolute Monocytes 1.1      Absolute Eosinophils 0.1      Absolute Basophils 0.1      Absolute Immature Granulocytes 0.1      Absolute NRBCs 0.0     IRON AND IRON BINDING CAPACITY - Abnormal    Iron 8 (*)     Iron Binding Capacity 413      Iron Sat Index 2 (*)    GROUP A STREPTOCOCCUS PCR THROAT SWAB - Abnormal    Group A strep by PCR Detected (*)    FERRITIN - Normal    Ferritin 22     HCG QUALITATIVE URINE POCT - Normal    HCG Qual Urine Negative      Internal QC Check POCT Valid      POCT Kit Lot Number [id number]      POCT Kit Expiration Date 2025-06-26     ROUTINE UA WITH MICROSCOPIC   RETICULOCYTE COUNT   VITAMIN B12   FOLATE   LACTATE DEHYDROGENASE   HAPTOGLOBIN   TYPE AND SCREEN, ADULT    ABO/RH(D) A POS      Antibody Screen Negative      SPECIMEN EXPIRATION DATE [id number]     URINE CULTURE   BLOOD CULTURE   BLOOD CULTURE   ABO/RH TYPE AND SCREEN   LAB BLOOD MORPHOLOGY PATHOLOGIST REVIEW     XR Chest 2 Views   Final Result   IMPRESSION: No infiltrate, pleural effusion or pneumothorax. The   cardiac and mediastinal silhouettes are normal.      DENIS DESOUZA MD             SYSTEM ID:  [id number]             Critical care was not performed.     Medical Decision Making  The patient's presentation was of high complexity (an acute health issue posing potential threat to life or bodily function).    The patient's evaluation involved:  ordering and/or review of 3+ test(s) in this encounter (see separate area of note for details)    The patient's management necessitated high risk (a decision regarding hospitalization).    Assessment & Plan        I have reviewed the nursing notes. I have reviewed the findings, diagnosis, plan and need for follow up with the patient.    Patient with hemoptysis possible GI bleed anemia hemoglobin of 8.2 positive COVID virus as well as positive strep pharyngitis at this time will be admitted for observation to make certain that her hemoglobin is staying stable and will follow-up with her primary clinic as well.  Patient will be admitted to the medicine service.    Final diagnoses:   Hemoptysis   Anemia, unspecified type   COVID-19 virus infection   Strep pharyngitis         [facility] EMERGENCY DEPARTMENT  12/27/2023     Derek Gonsalves MD  12/27/23 7439

## 2023-12-27 NOTE — ED NOTES
Recived patient from the on going shift at this time. Pt ready for admision to unit 8A. report given.Pt states her headache impoved by the tylenol. VSS

## 2023-12-27 NOTE — PROGRESS NOTES
1600: Pt arrived to unit 8A for admission. Vitals stable, pt complain of headache and sore throat. MD pagegavi.

## 2023-12-27 NOTE — MEDICATION SCRIBE - ADMISSION MEDICATION HISTORY
Medication Scribe Admission Medication History    Admission medication history is complete. The information provided in this note is only as accurate as the sources available at the time of the update.    Information Source(s): Patient and CareEverywhere/SureScripts via in-person    Pertinent Information: NA    Changes made to PTA medication list:  Added: None  Deleted: Imitrex, Lindex cream  Changed: effexor 75 mg XR to 150 mg XR    Medication Affordability:       Allergies reviewed with patient and updates made in EHR: yes    Medication History Completed By: Mari Gauthier 12/27/2023 1:26 PM    PTA Med List   Medication Sig Last Dose    venlafaxine (EFFEXOR XR) 150 MG 24 hr capsule Take 150 mg by mouth daily 12/27/2023

## 2023-12-27 NOTE — PLAN OF CARE
"Goal Outcome Evaluation:      Plan of Care Reviewed With: patient    Overall Patient Progress: no changeOverall Patient Progress: no change     Patient admitted 12/27/23 from ED for sore throat, nausea and vomiting, strep pharyngitis and COVID pos. Hx asperger's syndrome, depression, anxiety and eczema.    VS: /64 (BP Location: Left arm)   Pulse 77   Temp 98.4  F (36.9  C) (Oral)   Resp 18   Ht 1.549 m (5' 1\")   Wt 61.7 kg (136 lb 0.4 oz)   LMP 12/24/2023 (Approximate)   SpO2 97%   BMI 25.70 kg/m       O2: >90% on RA no complaints of SOB or chest pain.   Output: Voiding adequate amounts w/o pain or difficulty to bathroom.   Last BM:    Activity: Independent in room.    Skin: Visible skin intact.   Pain: 3/10 pain to throat and head.   CMS: AOX4   Dressing: None.   Diet: Regular, tolerating well. No complaints of nausea or vomiting.   LDA: R PIV - SL   Equipment: Personal belongings.   Plan: IV abx.    Additional Info:        "

## 2023-12-27 NOTE — H&P
"[facility]    History and Physical - Hospitalist Service, GOLD TEAM 18       Date of Admission:  12/27/2023    Assessment & Plan      Lynn Vaca is a 28 year old female admitted on 12/27/2023. She has a known h/o anxiety, now coming in with sore throat, nausea and vomiting, found to have strep pharyngitis and COVID positive     # Confirmed COVID-19 infection         Symptom Onset 12/24/23   Date of 1st Positive Test 12/27/2023   Vaccination Status Fully Vaccinated       - COVID-19 special precautions, continuous pulse-ox  - Oxygen: continue current support with O2 Device: None (Room air) at  ; titrate to keep SpO2 between 90-96%  - Labs: Labs notable for Anemia     - Imaging:  CXR with normal findings   - Breathing treatments: no inhalers needed; avoid nebulizers in favor of MDIs   - IV fluids: not indicated at this time  - Antibiotics:  On abx for strep pharyngitis     - COVID-Focused Medications: Paxlovid initiated on 12/27  - DVT Prophylaxis:         Observation Goals: Able to do ADL's , Resol;ution of nausea and being able to keep food down  Strep Pharyngitis:  S/P ceftriaxone and penicillin G IV in the ER  Continue penicillin V       Iron deficiency anemia:  Iron saturation index at 2, ferritin at 22  Likely secondary to heavy menstrual cycle.  LDH, B12, Folate and peripheral smear ordered  Start ferroud gluconate  Referral to PCP for consideration of OCP due to menorrhagia                    Diet:  Regular adult diet   DVT Prophylaxis: Low Risk/Ambulatory with no VTE prophylaxis indicated  Joiner Catheter: Not present  Lines: None     Cardiac Monitoring: None  Code Status:  Ful    Clinically Significant Risk Factors Present on Admission                       # Overweight: Estimated body mass index is 25.89 kg/m  as calculated from the following:    Height as of this encounter: 1.549 m (5' 1\").    Weight as of this encounter: 62.1 kg (137 lb).          "     Disposition Plan      Expected Discharge Date: 12/28/2023                  Tonia Mares MD  Hospitalist Service, GOLD TEAM 18  [facility]  Securely message with [employer] (more info)  Text page via [employer] Paging/Directory   See signed in provider for up to date coverage information    ______________________________________________________________________    Chief Complaint   Sore throat   Nausea and vomiting       History is obtained from the patient and chart review     History of Present Illness   Lynn Vaca is a 28 year old female who presets to the ER with 5 days of sore throat. She showed up at the ER today because of nausea and vomiting   She denies any fever or cough   She had all series of her COVID immunization  She denies chest pain or SOB  Works at [employer]  Adds that she has heavy periods, lasting for 7 days and most days, she needs to go through 3 pads   She is feeling better now with IV fluids and IV abx     Past Medical History    Past Medical History:   Diagnosis Date    ADHD (attention deficit hyperactivity disorder)     has been on concerta, strattera, and daytrana    Asperger's syndrome     Common migraine 4/17/2013    Eczema     Generalised anxiety disorder     Inguinal hernia without mention of obstruction or gangrene, unilateral or unspecified, (not specified as recurrent) 2002    Right    OCD (obsessive compulsive disorder)     was on luvox as a child    Oppositional defiant disorder of childhood or adolescence        Past Surgical History   Past Surgical History:   Procedure Laterality Date    HERNIA REPAIR, INGUINAL RT/LT  2002       Prior to Admission Medications   Prior to Admission Medications   Prescriptions Last Dose Informant Patient Reported? Taking?   venlafaxine (EFFEXOR XR) 150 MG 24 hr capsule 12/27/2023  Yes Yes   Sig: Take 150 mg by mouth daily      Facility-Administered Medications: None        Review of Systems     The 10 point Review of Systems is negative other than noted in the HPI or here.     Social History   I have reviewed this patient's social history and updated it with pertinent information if needed.  Social History     Tobacco Use    Smoking status: Never    Smokeless tobacco: Never   Vaping Use    Vaping Use: Never used   Substance Use Topics    Alcohol use: No    Drug use: No         Family History   I have reviewed this patient's family history and updated it with pertinent information if needed.  Family History   Problem Relation Age of Onset    Arthritis Mother         at age 22    Diabetes Maternal Grandmother         type II         Allergies   Allergies   Allergen Reactions    Bactrim [Sulfamethoxazole W-Trimethoprim] Itching and Swelling     11/27/13 - itching and swelling.        Physical Exam   Vital Signs: Temp: 98.5  F (36.9  C) Temp src: Axillary BP: 116/76 Pulse: 92   Resp: 18 SpO2: 100 % O2 Device: None (Room air)    Weight: 137 lbs 0 oz    Constitutional: awake, alert, cooperative, no apparent distress, and appears stated age  Eyes: Lids and lashes normal, pupils equal, round and reactive to light, extra ocular muscles intact, sclera clear, conjunctiva normal  ENT: Normocephalic, without obvious abnormality, atraumatic, sinuses nontender on palpation, external ears without lesions, oral pharynx with moist mucous membranes, Do not appreciate any pharyngeal erythema   Respiratory: No increased work of breathing, good air exchange, clear to auscultation bilaterally, no crackles or wheezing  Cardiovascular: Normal apical impulse, regular rate and rhythm, normal S1 and S2, no S3 or S4, and no murmur noted  GI: No scars, normal bowel sounds, soft, non-distended, non-tender, no masses palpated, no hepatosplenomegally  Skin: no bruising or bleeding  Musculoskeletal: no lower extremity pitting edema present  there is no redness, warmth, or swelling of the joints      35 MINUTES SPENT BY ME on the date of  service doing chart review, history, exam, documentation & further activities per the note.  MANAGEMENT DISCUSSED with the following over the past 24 hours: oly RN       Data     I have personally reviewed the following data over the past 24 hrs:    18.8 (H)  \   8.2 (L)   / 308     137 101 10.6 /  93   3.4 21 (L) 0.68 \     ALT: 21 AST: 19 AP: 89 TBILI: 0.3   ALB: 4.0 TOT PROTEIN: 7.0 LIPASE: N/A     Ferritin:  22 % Retic:  N/A LDH:  164

## 2023-12-27 NOTE — ED TRIAGE NOTES
Pt reports that she recently had blood in her stools but this cleared without medical intervention     Triage Assessment (Adult)       Row Name 12/27/23 0918          Triage Assessment    Airway WDL WDL        Respiratory WDL    Respiratory WDL WDL        Skin Circulation/Temperature WDL    Skin Circulation/Temperature WDL WDL        Cardiac WDL    Cardiac WDL WDL

## 2023-12-27 NOTE — TELEPHONE ENCOUNTER
"S-(situation): vomiting streaks of blood 3 times starting at 10 pm last night.     B-(background): vomiting started yesterday morning. Patient also had blood in stool about 3 weeks ago. Bleeding stopped so patient did not follow up with gastro. Patient also has a cold. Coughing leads to vomiting.     A-(assessment): patient needs to be assessed.     R-(recommendations): spoke with DOD who stated patient should be seen in the ER due to the last 3 times blood in vomit and recent history of blood in stool.   ---------------      1. APPEARANCE of BLOOD: \"What does the blood look like?\" (e.g., pink, red blood, coffee-grounds)      Red.   2. AMOUNT: \"How much blood was lost?\" (e.g., few streaks or strands, tablespoon, cup)      Few streaks.   3. VOMITING BLOOD: \"How many times did it happen?\" or \"How many times in the past 24 hours?\"      Three.   4. VOMITING WITHOUT BLOOD: \"How many times in the past 24 hours?\"       Three.   5. ONSET: \"When did vomiting of blood begin?\"      Tuesday, 12/26/2023 about 10 pm.   6. CAUSE: \"What do you think is causing the vomiting of blood?\"      Yesterday morning, patient started vomiting.   7. BLOOD THINNERS: \"Do you take any blood thinners?\" (e.g., Coumadin/warfarin, Pradaxa/dabigatran, aspirin)      No.   8. DEHYDRATION: \"Are there any signs of dehydration?\" \"When was the last time you urinated?\" \"Do you feel dizzy?\"      No.   9. ABDOMEN PAIN: \"Are you having any abdomen pain?\" If Yes, ask: \"What does it feel like? \" (e.g., crampy, dull, intermittent, constant)       \"Little when vomiting and now also. Dull crampy. Intermittent.   10. DIARRHEA: \"Is there any diarrhea?\" If Yes, ask: \"How many times today?\"         No.   11. OTHER SYMPTOMS: \"Do you have any other symptoms?\" (e.g., fever, blood in stool)        Sore throat and headache. Due to sore throat having difficulty swallowing. Patient does have a stuffed up nose. Slight cough. Coughing leads to throwing up and thus the bleeding. " "  12. PREGNANCY: \"Is there any chance you are pregnant?\" \"When was your last menstrual period?\"        No.     Patient was having blood in stool so was supposed to schedule an endoscopy/colonoscopy. This occurred about 3 weeks ago. The bleeding stopped so patient did not have the procedure.     Writer spoke with Dr. Cartagena who stated that patient should go to the ER due to the streaks of blood in vomiting and the recent blood in stool.     Writer spoke with patient/daughter who stated that they would go to the [facility] ER.     Reason for Disposition   Constant abdominal pain and present > 2 hours    Additional Information   Negative: Shock suspected (e.g., cold/pale/clammy skin, too weak to stand, low BP, rapid pulse)   Negative: Difficult to awaken or acting confused (e.g., disoriented, slurred speech)   Negative: Unable to walk, or can only walk with assistance (e.g., requires support)   Negative: Fainted   Negative: Vomited blood and large amount (example: \"a cup of blood\")   Negative: Rectal bleeding (i.e., bloody stool, blood in stool)   Negative: Sounds like a life-threatening emergency to the triager   Negative: Coughing up blood (i.e., from lungs)    Protocols used: Vomiting Blood-A-OH    AL Maciel RN  [facility]    "

## 2023-12-28 VITALS
WEIGHT: 136.02 LBS | BODY MASS INDEX: 25.68 KG/M2 | HEIGHT: 61 IN | HEART RATE: 75 BPM | TEMPERATURE: 98.1 F | DIASTOLIC BLOOD PRESSURE: 62 MMHG | RESPIRATION RATE: 16 BRPM | OXYGEN SATURATION: 100 % | SYSTOLIC BLOOD PRESSURE: 106 MMHG

## 2023-12-28 LAB
ALBUMIN SERPL BCG-MCNC: 3.7 G/DL (ref 3.5–5.2)
ALP SERPL-CCNC: 93 U/L (ref 40–150)
ALT SERPL W P-5'-P-CCNC: 86 U/L (ref 0–50)
ANION GAP SERPL CALCULATED.3IONS-SCNC: 8 MMOL/L (ref 7–15)
AST SERPL W P-5'-P-CCNC: 101 U/L (ref 0–45)
BACTERIA UR CULT: NORMAL
BASOPHILS # BLD AUTO: 0 10E3/UL (ref 0–0.2)
BASOPHILS NFR BLD AUTO: 0 %
BILIRUB SERPL-MCNC: 0.3 MG/DL
BUN SERPL-MCNC: 10.9 MG/DL (ref 6–20)
CALCIUM SERPL-MCNC: 8.7 MG/DL (ref 8.6–10)
CHLORIDE SERPL-SCNC: 106 MMOL/L (ref 98–107)
CREAT SERPL-MCNC: 0.53 MG/DL (ref 0.51–0.95)
DEPRECATED HCO3 PLAS-SCNC: 25 MMOL/L (ref 22–29)
EGFRCR SERPLBLD CKD-EPI 2021: >90 ML/MIN/1.73M2
EOSINOPHIL # BLD AUTO: 0.5 10E3/UL (ref 0–0.7)
EOSINOPHIL NFR BLD AUTO: 4 %
ERYTHROCYTE [DISTWIDTH] IN BLOOD BY AUTOMATED COUNT: 18.4 % (ref 10–15)
GLUCOSE SERPL-MCNC: 89 MG/DL (ref 70–99)
HCT VFR BLD AUTO: 29.5 % (ref 35–47)
HGB BLD-MCNC: 8.1 G/DL (ref 11.7–15.7)
IMM GRANULOCYTES # BLD: 0.1 10E3/UL
IMM GRANULOCYTES NFR BLD: 0 %
LYMPHOCYTES # BLD AUTO: 1 10E3/UL (ref 0.8–5.3)
LYMPHOCYTES NFR BLD AUTO: 9 %
MCH RBC QN AUTO: 16.4 PG (ref 26.5–33)
MCHC RBC AUTO-ENTMCNC: 27.5 G/DL (ref 31.5–36.5)
MCV RBC AUTO: 60 FL (ref 78–100)
MONOCYTES # BLD AUTO: 1 10E3/UL (ref 0–1.3)
MONOCYTES NFR BLD AUTO: 8 %
NEUTROPHILS # BLD AUTO: 9.3 10E3/UL (ref 1.6–8.3)
NEUTROPHILS NFR BLD AUTO: 79 %
NRBC # BLD AUTO: 0 10E3/UL
NRBC BLD AUTO-RTO: 0 /100
PATH REPORT.COMMENTS IMP SPEC: NORMAL
PATH REPORT.COMMENTS IMP SPEC: NORMAL
PATH REPORT.FINAL DX SPEC: NORMAL
PATH REPORT.MICROSCOPIC SPEC OTHER STN: NORMAL
PATH REPORT.MICROSCOPIC SPEC OTHER STN: NORMAL
PATH REPORT.RELEVANT HX SPEC: NORMAL
PLATELET # BLD AUTO: 319 10E3/UL (ref 150–450)
POTASSIUM SERPL-SCNC: 3.5 MMOL/L (ref 3.4–5.3)
PROT SERPL-MCNC: 6.3 G/DL (ref 6.4–8.3)
RBC # BLD AUTO: 4.93 10E6/UL (ref 3.8–5.2)
SODIUM SERPL-SCNC: 139 MMOL/L (ref 135–145)
WBC # BLD AUTO: 11.9 10E3/UL (ref 4–11)

## 2023-12-28 PROCEDURE — 80053 COMPREHEN METABOLIC PANEL: CPT | Performed by: INTERNAL MEDICINE

## 2023-12-28 PROCEDURE — 250N000011 HC RX IP 250 OP 636: Performed by: INTERNAL MEDICINE

## 2023-12-28 PROCEDURE — 99239 HOSP IP/OBS DSCHRG MGMT >30: CPT | Performed by: INTERNAL MEDICINE

## 2023-12-28 PROCEDURE — 96376 TX/PRO/DX INJ SAME DRUG ADON: CPT

## 2023-12-28 PROCEDURE — 250N000013 HC RX MED GY IP 250 OP 250 PS 637: Performed by: INTERNAL MEDICINE

## 2023-12-28 PROCEDURE — 36415 COLL VENOUS BLD VENIPUNCTURE: CPT | Performed by: INTERNAL MEDICINE

## 2023-12-28 PROCEDURE — 85025 COMPLETE CBC W/AUTO DIFF WBC: CPT | Performed by: INTERNAL MEDICINE

## 2023-12-28 PROCEDURE — G0378 HOSPITAL OBSERVATION PER HR: HCPCS

## 2023-12-28 RX ORDER — AMOXICILLIN 500 MG/1
500 CAPSULE ORAL EVERY 12 HOURS
Qty: 18 CAPSULE | Refills: 0 | Status: SHIPPED | OUTPATIENT
Start: 2023-12-28 | End: 2024-01-11

## 2023-12-28 RX ORDER — FERROUS GLUCONATE 324(38)MG
324 TABLET ORAL
Qty: 90 TABLET | Refills: 0 | Status: SHIPPED | OUTPATIENT
Start: 2023-12-28 | End: 2024-07-24

## 2023-12-28 RX ORDER — ONDANSETRON 4 MG/1
4 TABLET, ORALLY DISINTEGRATING ORAL EVERY 6 HOURS PRN
Qty: 20 TABLET | Refills: 0 | Status: SHIPPED | OUTPATIENT
Start: 2023-12-28 | End: 2024-01-11

## 2023-12-28 RX ORDER — AMOXICILLIN 500 MG/1
500 CAPSULE ORAL EVERY 12 HOURS SCHEDULED
Status: DISCONTINUED | OUTPATIENT
Start: 2023-12-28 | End: 2023-12-28 | Stop reason: HOSPADM

## 2023-12-28 RX ADMIN — AMOXICILLIN 500 MG: 500 CAPSULE ORAL at 08:13

## 2023-12-28 RX ADMIN — FERROUS GLUCONATE 324 MG: 324 TABLET ORAL at 08:13

## 2023-12-28 RX ADMIN — ONDANSETRON 4 MG: 2 INJECTION INTRAMUSCULAR; INTRAVENOUS at 10:14

## 2023-12-28 RX ADMIN — ACETAMINOPHEN 975 MG: 325 TABLET, FILM COATED ORAL at 03:24

## 2023-12-28 RX ADMIN — VENLAFAXINE HYDROCHLORIDE 150 MG: 75 CAPSULE, EXTENDED RELEASE ORAL at 08:13

## 2023-12-28 ASSESSMENT — ACTIVITIES OF DAILY LIVING (ADL)
ADLS_ACUITY_SCORE: 35

## 2023-12-28 NOTE — PLAN OF CARE
[id number]    Goal Outcome Evaluation:    Patient alert and oriented, stable on room air, denies SOB, chest pain, dizziness, numbness/tingling. Up independently in room. Pain managed with prn tylenol x2 this shift. Pt c/o sore throat/coughing, prn throat lozenge administered with relief. Regular diet, thin liquids, pills whole, denies nausea/vomiting. Left PIV, saline locked. Pt resting in bed this shift, respirations even and unlabored. Continue special precautions for COVID+. Able to make needs known. Call light within reach. Continue with current plan of care.

## 2023-12-28 NOTE — PLAN OF CARE
DISCHARGE SUMMARY    Pt discharging to: Home  Transportation: own car  AVS given and discussed: Yes, no further questions.   Medications given: no; filled else where;discussed. No further questions.   Belongings returned: Yes, ensured all belongings packed and sent with pt. No items in security.   Comments: Escorted safely to elevators. Goal Outcome Evaluation: met.    Fab Bowen RN

## 2023-12-28 NOTE — DISCHARGE SUMMARY
"[facility]  Hospitalist Discharge Summary      Date of Admission:  12/27/2023  Date of Discharge:  12/28/2023  Discharging Provider: Tonia Mares MD  Discharge Service: Hospitalist Service, GOLD TEAM 18  Discharge Diagnoses   Anxiety  Asperger's syndrome  COVID positive   Strep Pharyngitis  Iron deficiency anemia     Clinically Significant Risk Factors     # Overweight: Estimated body mass index is 25.7 kg/m  as calculated from the following:    Height as of this encounter: 1.549 m (5' 1\").    Weight as of this encounter: 61.7 kg (136 lb 0.4 oz).       Follow-ups Needed After Discharge   Follow-up Appointments     Adult [facility] Follow-up and recommended labs and tests      Follow up with primary care provider, Zulema Valdez, within 7 days   Evaluation of iron deficiency anemia and treatment of menorrhagia   .  No follow up labs or test are needed.CBC      Appointments on [place] and/or [facility] (with [facility] or [facility]   provider or service). Call [phone] if you haven't heard regarding   these appointments within 7 days of discharge.            Unresulted Labs Ordered in the Past 30 Days of this Admission       Date and Time Order Name Status Description    12/27/2023  3:50 PM Bld morphology pathology review In process     12/27/2023 11:37 AM Blood Culture Arm, Right Preliminary     12/27/2023 11:37 AM Blood Culture Arm, Left Preliminary         These results will be followed up by PCP    Discharge Disposition   Discharged to home  Condition at discharge: Stable    Hospital Course      Lynn Vaca is a 28 year old female admitted on 12/27/2023. She has a known h/o anxiety, now coming in with sore throat, nausea and vomiting, found to have strep pharyngitis and COVID positive     # Confirmed COVID-19 infection         Symptom Onset 12/24/23   Date of 1st Positive Test 12/27/2023   Vaccination Status Fully Vaccinated       - COVID-19 " special precautions, continuous pulse-ox  - Oxygen: continue current support with O2 Device: None (Room air) at  ; titrate to keep SpO2 between 90-96%  - Labs: Labs notable for Anemia     - Imaging:  CXR with normal findings   - Breathing treatments: no inhalers needed; avoid nebulizers in favor of MDIs   - IV fluids: not indicated at this time  - Antibiotics:  On abx for strep pharyngitis     - COVID-Focused Medications: Paxlovid initiated on 12/27, to complete on 1/1/24  - DVT Prophylaxis: Not required           Strep Pharyngitis:  S/P ceftriaxone and penicillin G IV in the ER  Initiate Amoxicillin X 10 days as per treatment protocol   Prescribe zofran for nausea     Iron deficiency anemia:  Iron saturation index at 2, ferritin at 22  Likely secondary to heavy menstrual cycle.  LDH, B12 and  Folate are within normal limits   peripheral smear ordered  Start ferroud gluconate daily   Referral to PCP for consideration of OCP due to menorrhagia        Diet:  Regular adult diet   DVT Prophylaxis: Low Risk/Ambulatory with no VTE prophylaxis indicated  Joiner Catheter: Not present  Lines: None     Cardiac Monitoring: None  Code Status:  Ful    Consultations This Hospital Stay   None    Code Status   Full Code    Time Spent on this Encounter   I, Tonia Mares MD, personally saw the patient today and spent greater than 30 minutes discharging this patient.       Tonia Mares MD  [facility] [address]  [address] [place] [id number]  Phone: [phone]  Fax: [phone]  ______________________________________________________________________    Physical Exam   Vital Signs: Temp: 98.1  F (36.7  C) Temp src: Oral BP: 106/62 Pulse: 75   Resp: 16 SpO2: 100 % O2 Device: None (Room air)    Weight: 136 lbs .38 oz  General Appearance: Awake, alert and not in distress  Respiratory: Clear breath sounds bilaterally   Cardiovascular: Normal heart sounds. No murmurs   GI: Soft, non tender. Normal  bowel sounds   Skin: No bruising or bleeding   Other:Awake, alert and orientated X 3          Primary Care Physician   Zulema Valdez    Discharge Orders      Reason for your hospital stay    COVID and Strep Throat     Activity    Your activity upon discharge: activity as tolerated     Adult [facility] Follow-up and recommended labs and tests    Follow up with primary care provider, Zulema Valdez, within 7 days Evaluation of iron deficiency anemia and treatment of menorrhagia   .  No follow up labs or test are needed.CBC      Appointments on [place] and/or [facility] (with [facility] or [facility] provider or service). Call [phone] if you haven't heard regarding these appointments within 7 days of discharge.     Diet    Follow this diet upon discharge: Orders Placed This Encounter      Regular Diet Adult       Significant Results and Procedures   Results for orders placed or performed during the hospital encounter of 12/27/23   XR Chest 2 Views    Narrative    XR CHEST 2 VIEWS 12/27/2023 11:20 AM    HISTORY: cough with blood    COMPARISON: None.      Impression    IMPRESSION: No infiltrate, pleural effusion or pneumothorax. The  cardiac and mediastinal silhouettes are normal.    DENIS DESOUZA MD         SYSTEM ID:  [id number]       Discharge Medications   Current Discharge Medication List        START taking these medications    Details   amoxicillin (AMOXIL) 500 MG capsule Take 1 capsule (500 mg) by mouth every 12 hours  Qty: 18 capsule, Refills: 0    Associated Diagnoses: Strep pharyngitis      ferrous gluconate (FERGON) 324 (38 Fe) MG tablet Take 1 tablet (324 mg) by mouth daily (with breakfast)  Qty: 90 tablet, Refills: 0    Associated Diagnoses: Iron deficiency anemia due to chronic blood loss      nirmatrelvir and ritonavir (PAXLOVID) 300 mg/100 mg therapy pack Take 3 tablets by mouth 2 times daily for 4 days . Finish remainder of Paxlovid therapy pack that was started in the hospital.  Follow  instructions on that pack.    Comments: This order will not be sent to a retail pharmacy. This order is intended for the AVS summary and for continuation of the remainder of the Paxlovid pack dispensed by the inpatient pharmacy at home. Confirm that the patient has received the inpatient pharmacy supplied Paxlovid to take home.  Associated Diagnoses: COVID-19 virus infection      ondansetron (ZOFRAN ODT) 4 MG ODT tab Take 1 tablet (4 mg) by mouth every 6 hours as needed for nausea or vomiting  Qty: 20 tablet, Refills: 0    Associated Diagnoses: Nausea      phenol (CHLORASEPTIC) 1.4 % spray Take 1 spray (1 mL) by mouth every hour as needed for sore throat  Qty: 118 mL, Refills: 0    Associated Diagnoses: Strep pharyngitis           CONTINUE these medications which have NOT CHANGED    Details   venlafaxine (EFFEXOR XR) 150 MG 24 hr capsule Take 150 mg by mouth daily           Allergies   Allergies   Allergen Reactions    Bactrim [Sulfamethoxazole W-Trimethoprim] Itching and Swelling     11/27/13 - itching and swelling.

## 2023-12-29 ENCOUNTER — PATIENT OUTREACH (OUTPATIENT)
Dept: CARE COORDINATION | Facility: CLINIC | Age: 28
End: 2023-12-29
Payer: COMMERCIAL

## 2023-12-29 NOTE — PROGRESS NOTES
[facility]: [facility]: Post-Discharge Note  SITUATION                                                      Admission:    Admission Date: 12/27/23   Reason for Admission: Hemoptysis  Anemia, unspecified type  COVID-19 virus infection  Strep pharyngitis  Discharge:   Discharge Date: 12/28/23  Discharge Diagnosis: Anxiety  Asperger's syndrome  COVID positive   Strep Pharyngitis  Iron deficiency anemia    BACKGROUND                                                      Per hospital discharge summary and inpatient provider notes:    Lynn Vaca is a 28 year old female with a history of Asperger's syndrome, depression, anxiety, and eczema presents to the ED for vomiting and pharyngitis.  Patient notes that she had several emesis today that had blood streaks throughout much of it she states that there was increased amounts of blood and she was concerned about the blood that was present with both vomiting and coughing.  Patient also notes that last week she had blood in her stools and feels as though this has resolved on its own.      ASSESSMENT           Discharge Assessment  How are you doing now that you are home?: Patient states she is still a little weak but has been able to keep food down, throat isn't as sore.   Feels like things are getting better, doing good.  How are your symptoms? (Red Flag symptoms escalate to triage hotline per guidelines): Improved  Do you feel your condition is stable enough to be safe at home until your provider visit?: Yes  Does the patient have their discharge instructions? : Yes  Does the patient have questions regarding their discharge instructions? : No  Were you started on any new medications or were there changes to any of your previous medications? : Yes  Does the patient have all of their medications?: No (see comment) (Patient did not get the Chloraseptic spray but does not feel like she needs it.)  Do you have questions regarding any of your  medications? : No  Do you have all of your needed medical supplies or equipment (DME)?  (i.e. oxygen tank, CPAP, cane, etc.): Yes  Discharge follow-up appointment scheduled within 14 calendar days? : No  Is patient agreeable to assistance with scheduling? : Yes         Post-op (Clinicians Only)  Did the patient have surgery or a procedure: No    RN attempted to warm transfer patient to scheduling. Patient dropped the call before call completed.  RN attempted to reach patient again to assist with scheduling.  RN left message providing 24/7 scheduling/nurse line information        PLAN                                                      Outpatient Plan:   Follow up with primary care provider, Zulema Valdez, within 7 days   Evaluation of iron deficiency anemia and treatment of menorrhagia   .  No follow up labs or test are needed.CBC        No future appointments.      For any urgent concerns, please contact our 24 hour nurse triage line: [phone] ([phone])         Joanne Davila RN

## 2024-01-01 ENCOUNTER — TELEPHONE (OUTPATIENT)
Dept: FAMILY MEDICINE | Facility: CLINIC | Age: 29
End: 2024-01-01
Payer: COMMERCIAL

## 2024-01-01 LAB
BACTERIA BLD CULT: NO GROWTH
BACTERIA BLD CULT: NO GROWTH

## 2024-01-02 NOTE — TELEPHONE ENCOUNTER
"Patient discharged 12/28/2023.  Please schedule her for hospital follow-up visit with me using \"APPROVAL REQ\" slot.    Zulema Valdez MD  [facility]   "
Patient is scheduled for ed follow up on 1/11  
Yes

## 2024-01-11 ENCOUNTER — OFFICE VISIT (OUTPATIENT)
Dept: FAMILY MEDICINE | Facility: CLINIC | Age: 29
End: 2024-01-11
Payer: COMMERCIAL

## 2024-01-11 VITALS
DIASTOLIC BLOOD PRESSURE: 82 MMHG | OXYGEN SATURATION: 100 % | HEIGHT: 61 IN | TEMPERATURE: 97.8 F | HEART RATE: 88 BPM | SYSTOLIC BLOOD PRESSURE: 119 MMHG | BODY MASS INDEX: 25.9 KG/M2 | WEIGHT: 137.2 LBS

## 2024-01-11 DIAGNOSIS — K92.0 HEMATEMESIS WITH NAUSEA: ICD-10-CM

## 2024-01-11 DIAGNOSIS — U07.1 INFECTION DUE TO 2019 NOVEL CORONAVIRUS: Primary | ICD-10-CM

## 2024-01-11 DIAGNOSIS — K92.1 HEMATOCHEZIA: ICD-10-CM

## 2024-01-11 DIAGNOSIS — D50.0 IRON DEFICIENCY ANEMIA DUE TO CHRONIC BLOOD LOSS: ICD-10-CM

## 2024-01-11 DIAGNOSIS — R74.01 ELEVATED TRANSAMINASE LEVEL: ICD-10-CM

## 2024-01-11 DIAGNOSIS — N92.0 MENORRHAGIA WITH REGULAR CYCLE: ICD-10-CM

## 2024-01-11 LAB
BASOPHILS # BLD AUTO: 0.1 10E3/UL (ref 0–0.2)
BASOPHILS NFR BLD AUTO: 1 %
EOSINOPHIL # BLD AUTO: 0.2 10E3/UL (ref 0–0.7)
EOSINOPHIL NFR BLD AUTO: 3 %
ERYTHROCYTE [DISTWIDTH] IN BLOOD BY AUTOMATED COUNT: 19.3 % (ref 10–15)
HCT VFR BLD AUTO: 29.3 % (ref 35–47)
HGB BLD-MCNC: 8.2 G/DL (ref 11.7–15.7)
IMM GRANULOCYTES # BLD: 0 10E3/UL
IMM GRANULOCYTES NFR BLD: 0 %
LYMPHOCYTES # BLD AUTO: 1.3 10E3/UL (ref 0.8–5.3)
LYMPHOCYTES NFR BLD AUTO: 18 %
MCH RBC QN AUTO: 16.4 PG (ref 26.5–33)
MCHC RBC AUTO-ENTMCNC: 28 G/DL (ref 31.5–36.5)
MCV RBC AUTO: 59 FL (ref 78–100)
MONOCYTES # BLD AUTO: 0.6 10E3/UL (ref 0–1.3)
MONOCYTES NFR BLD AUTO: 8 %
NEUTROPHILS # BLD AUTO: 5.3 10E3/UL (ref 1.6–8.3)
NEUTROPHILS NFR BLD AUTO: 70 %
PLATELET # BLD AUTO: 491 10E3/UL (ref 150–450)
RBC # BLD AUTO: 5 10E6/UL (ref 3.8–5.2)
WBC # BLD AUTO: 7.5 10E3/UL (ref 4–11)

## 2024-01-11 PROCEDURE — 80053 COMPREHEN METABOLIC PANEL: CPT | Performed by: FAMILY MEDICINE

## 2024-01-11 PROCEDURE — 99495 TRANSJ CARE MGMT MOD F2F 14D: CPT | Performed by: FAMILY MEDICINE

## 2024-01-11 PROCEDURE — 36415 COLL VENOUS BLD VENIPUNCTURE: CPT | Performed by: FAMILY MEDICINE

## 2024-01-11 PROCEDURE — 85025 COMPLETE CBC W/AUTO DIFF WBC: CPT | Performed by: FAMILY MEDICINE

## 2024-01-11 ASSESSMENT — PATIENT HEALTH QUESTIONNAIRE - PHQ9
10. IF YOU CHECKED OFF ANY PROBLEMS, HOW DIFFICULT HAVE THESE PROBLEMS MADE IT FOR YOU TO DO YOUR WORK, TAKE CARE OF THINGS AT HOME, OR GET ALONG WITH OTHER PEOPLE: NOT DIFFICULT AT ALL
SUM OF ALL RESPONSES TO PHQ QUESTIONS 1-9: 7
SUM OF ALL RESPONSES TO PHQ QUESTIONS 1-9: 7

## 2024-01-11 NOTE — PATIENT INSTRUCTIONS
You do need a colonoscopy due to the red blood in your stool that occurred before your acute illness.  Please call [phone].

## 2024-01-11 NOTE — PROGRESS NOTES
Assessment & Plan     Infection due to 2019 novel coronavirus  As well as strep throat.  She started Paxlovid in the hospital but only took it for one day as she was not sent home with it.  She completed amoxicillin and feels much better - back to normal.      Elevated transaminase level  Likely due to acute illness and we'll recheck   - Comprehensive metabolic panel (BMP + Alb, Alk Phos, ALT, AST, Total. Bili, TP)    Iron deficiency anemia due to chronic blood loss  Presumably due to menorrhagia but I am concerned that she has also had unexplained hematemesis and hematochezia (which preceded her acute illness)  - Ob/Gyn  Referral  - Adult GI  Referral - Procedure Only  - CBC with platelets and differential    Menorrhagia with regular cycle  I recommended she consider a Mirena IUD.  She will discuss with GYN.    - Ob/Gyn  Referral    Hematemesis with nausea  Needs EGD  - Adult GI  Referral - Procedure Only  - CBC with platelets and differential    Hematochezia  Needs colonoscopy  - Adult GI  Referral - Procedure Only  - CBC with platelets and differential     MED REC REQUIRED  Post Medication Reconciliation Status:  Discharge medications reconciled, continue medications without change    Zulema Valdez MD  [facility]        Lucas Bailey is a 28 year old, presenting for the following health issues:  Hospital F/U        1/11/2024     4:19 PM   Additional Questions   Roomed by julita   Accompanied by self       HPI         12/29/2023     1:30 PM   Post Discharge Outreach   Admission Date 12/27/2023   Reason for Admission Hemoptysis  Anemia, unspecified type  COVID-19 virus infection  Strep pharyngitis   Discharge Date 12/28/2023   Discharge Diagnosis Anxiety  Asperger's syndrome  COVID positive   Strep Pharyngitis  Iron deficiency anemia   How are you doing now that you are home? Patient states she is still a little weak but has been  able to keep food down, throat isn't as sore.   Feels like things are getting better, doing good.   How are your symptoms? (Red Flag symptoms escalate to triage hotline per guidelines) Improved   Do you feel your condition is stable enough to be safe at home until your provider visit? Yes   Does the patient have their discharge instructions?  Yes   Does the patient have questions regarding their discharge instructions?  No   Were you started on any new medications or were there changes to any of your previous medications?  Yes   Does the patient have all of their medications? No (see comment)   Do you have questions regarding any of your medications?  No   Do you have all of your needed medical supplies or equipment (DME)?  (i.e. oxygen tank, CPAP, cane, etc.) Yes   Discharge follow-up appointment scheduled within 14 calendar days?  No     Hospital Follow-up Visit:    Hospital/Nursing Home/IP Rehab Facility: [facility]  Date of Admission: 12/27/2023  Date of Discharge: 12/28/2023  Reason(s) for Admission: covid    Was your hospitalization related to COVID-19? YES   How are you feeling today? Better  In the past 24 hours have you had shortness of breath when speaking, walking, or climbing stairs? I don't have breathing problems  Do you have a cough? I don't have a cough  When is the last time you had a fever greater than 100? 12/27/2023  Are you having any other symptoms? None   Do you have any other stressors you would like to discuss with your provider? No  Was the patient in the ICU or did the patient experience delirium during hospitalization?  No    Problems taking medications regularly:  None - other than that she was not given the remainder of her Paxlovid course upon discharge.  Medication changes since discharge: None  Problems adhering to non-medication therapy:  None    Summary of hospitalization:  [facility] discharge summary  reviewed  Diagnostic Tests/Treatments reviewed.  Follow up needed: none  Other Healthcare Providers Involved in Patient s Care:         None  Update since discharge: improved.   Plan of care communicated with patient     FROM DISCHARGE SUMMARY:  Hospital Course  Lynn Vaca is a 28 year old female admitted on 12/27/2023. She has a known h/o anxiety, now coming in with sore throat, nausea and vomiting, found to have strep pharyngitis and COVID positive      # Confirmed COVID-19 infection          Symptom Onset 12/24/23   Date of 1st Positive Test 12/27/2023   Vaccination Status Fully Vaccinated         - COVID-19 special precautions, continuous pulse-ox  - Oxygen: continue current support with O2 Device: None (Room air) at  ; titrate to keep SpO2 between 90-96%  - Labs: Labs notable for Anemia     - Imaging:  CXR with normal findings   - Breathing treatments: no inhalers needed; avoid nebulizers in favor of MDIs   - IV fluids: not indicated at this time  - Antibiotics:  On abx for strep pharyngitis     - COVID-Focused Medications: Paxlovid initiated on 12/27, to complete on 1/1/24  - DVT Prophylaxis: Not required            Strep Pharyngitis:  S/P ceftriaxone and penicillin G IV in the ER  Initiate Amoxicillin X 10 days as per treatment protocol   Prescribe zofran for nausea      Iron deficiency anemia:  Iron saturation index at 2, ferritin at 22  Likely secondary to heavy menstrual cycle.  LDH, B12 and  Folate are within normal limits   peripheral smear ordered  Start ferroud gluconate daily   Referral to PCP for consideration of OCP due to menorrhagia      UPDATE SINCE DISCHARGE:  She states she was discharged without the remaining Paxlovid which had been started in the hospital (!)  She did get the amoxicillin and ondansetron and she finished both.  She has the ferrous gluconate but has trouble remembering to take it.   She does have heavy periods.  She did have bright red blood per rectum a month before she  "got sick.  She also vomited some blood at the onset of her illness.      Review of Systems   as above       Objective    /82 (BP Location: Right arm, Patient Position: Sitting, Cuff Size: Adult Regular)   Pulse 88   Temp 97.8  F (36.6  C) (Temporal)   Ht 1.545 m (5' 0.83\")   Wt 62.2 kg (137 lb 3.2 oz)   LMP 12/24/2023 (Approximate)   SpO2 100%   BMI 26.07 kg/m    Body mass index is 26.07 kg/m .  Physical Exam   GENERAL: healthy, alert and no distress  EYES: Eyes grossly normal to inspection, conjunctivae and sclerae normal  RESP: lungs clear to auscultation - no rales, rhonchi or wheezes  CV: regular rate and rhythm, normal S1 S2, no S3 or S4, no murmur, click or rub, no peripheral edema  ABDOMEN: soft, nontender, no hepatosplenomegaly, no masses  MS: no gross musculoskeletal defects noted, no edema  SKIN: no suspicious lesions or rashes  NEURO: Grossly normal strength and tone, mentation intact and speech normal  PSYCH: mentation appears normal, affect normal/bright            "

## 2024-01-12 LAB
ALBUMIN SERPL BCG-MCNC: 4.3 G/DL (ref 3.5–5.2)
ALP SERPL-CCNC: 86 U/L (ref 40–150)
ALT SERPL W P-5'-P-CCNC: 24 U/L (ref 0–50)
ANION GAP SERPL CALCULATED.3IONS-SCNC: 11 MMOL/L (ref 7–15)
AST SERPL W P-5'-P-CCNC: 26 U/L (ref 0–45)
BILIRUB SERPL-MCNC: 0.2 MG/DL
BUN SERPL-MCNC: 15.1 MG/DL (ref 6–20)
CALCIUM SERPL-MCNC: 8.9 MG/DL (ref 8.6–10)
CHLORIDE SERPL-SCNC: 103 MMOL/L (ref 98–107)
CREAT SERPL-MCNC: 0.62 MG/DL (ref 0.51–0.95)
DEPRECATED HCO3 PLAS-SCNC: 22 MMOL/L (ref 22–29)
EGFRCR SERPLBLD CKD-EPI 2021: >90 ML/MIN/1.73M2
GLUCOSE SERPL-MCNC: 82 MG/DL (ref 70–99)
POTASSIUM SERPL-SCNC: 4.4 MMOL/L (ref 3.4–5.3)
PROT SERPL-MCNC: 6.7 G/DL (ref 6.4–8.3)
SODIUM SERPL-SCNC: 136 MMOL/L (ref 135–145)

## 2024-01-14 NOTE — RESULT ENCOUNTER NOTE
Adrian Bailey,  Your anemia is stable and your liver tests (AST, ALT) have returned to normal.  We should monitor the anemia.  Gyn can check it again when you see them.     Zulema Valdez MD

## 2024-01-17 ENCOUNTER — HOSPITAL ENCOUNTER (OUTPATIENT)
Facility: CLINIC | Age: 29
End: 2024-01-17
Attending: INTERNAL MEDICINE | Admitting: INTERNAL MEDICINE
Payer: COMMERCIAL

## 2024-01-17 ENCOUNTER — TELEPHONE (OUTPATIENT)
Dept: GASTROENTEROLOGY | Facility: CLINIC | Age: 29
End: 2024-01-17
Payer: COMMERCIAL

## 2024-01-17 NOTE — TELEPHONE ENCOUNTER
"Endoscopy Scheduling Screen    Have you had a positive Covid test in the last 14 days?  No    Are you active on MyChart?   Yes    What insurance is in the chart?  Other:  [employer]    Ordering/Referring Provider: GIGI GOODRICH    (If ordering provider performs procedure, schedule with ordering provider unless otherwise instructed. )    BMI: Estimated body mass index is 26.07 kg/m  as calculated from the following:    Height as of 1/11/24: 1.545 m (5' 0.83\").    Weight as of 1/11/24: 62.2 kg (137 lb 3.2 oz).     Sedation Ordered  moderate sedation.   If patient BMI > 50 do not schedule in ASC.    If patient BMI > 45 do not schedule at [facility].    Are you taking methadone or Suboxone?  No    Are you taking any prescription medications for pain 3 or more times per week?   NO - No RN review required.    Do you have a history of malignant hyperthermia or adverse reaction to anesthesia?  No    (Females) Are you currently pregnant?   No     Have you been diagnosed or told you have pulmonary hypertension?   No    Do you have an LVAD?  No    Have you been told you have moderate to severe sleep apnea?  No    Have you been told you have COPD, asthma, or any other lung disease?  No    Do you have any heart conditions?  No     Have you ever had an organ transplant?   No    Have you ever had or are you awaiting a heart or lung transplant?   No    Have you had a stroke or transient ischemic attack (TIA aka \"mini stroke\" in the last 6 months?   No    Have you been diagnosed with or been told you have cirrhosis of the liver?   No    Are you currently on dialysis?   No    Do you need assistance transferring?   No    BMI: Estimated body mass index is 26.07 kg/m  as calculated from the following:    Height as of 1/11/24: 1.545 m (5' 0.83\").    Weight as of 1/11/24: 62.2 kg (137 lb 3.2 oz).     Is patients BMI > 40 and scheduling location [facility]?  No    Do you take an injectable medication for weight loss or diabetes (excluding " insulin)?  No    Do you take the medication Naltrexone?  No    Do you take blood thinners?  No       Prep   Are you currently on dialysis or do you have chronic kidney disease?  No    Do you have a diagnosis of diabetes?  No    Do you have a diagnosis of cystic fibrosis (CF)?  No    On a regular basis do you go 3 -5 days between bowel movements?  No    BMI > 40?  No    Preferred Pharmacy:    [facility] [address] [facility]  [address] [facility] [address]  Phone: [phone] Fax: [phone]      Final Scheduling Details   Colonoscopy prep sent?  Standard MiraLAX    Procedure scheduled  Colonoscopy / Upper endoscopy (EGD)    Surgeon:  minoo     Date of procedure:  4/18     Pre-OP / PAC:   No - Not required for this site.    Location  [facility] - Per order.    Sedation   Moderate Sedation - Per order.      Patient Reminders:   You will receive a call from a Nurse to review instructions and health history.  This assessment must be completed prior to your procedure.  Failure to complete the Nurse assessment may result in the procedure being cancelled.      On the day of your procedure, please designate an adult(s) who can drive you home stay with you for the next 24 hours. The medicines used in the exam will make you sleepy. You will not be able to drive.      You cannot take public transportation, ride share services, or non-medical taxi service without a responsible caregiver.  Medical transport services are allowed with the requirement that a responsible caregiver will receive you at your destination.  We require that drivers and caregivers are confirmed prior to your procedure.

## 2024-02-08 ENCOUNTER — OFFICE VISIT (OUTPATIENT)
Dept: OBGYN | Facility: CLINIC | Age: 29
End: 2024-02-08
Attending: FAMILY MEDICINE
Payer: COMMERCIAL

## 2024-02-08 VITALS
SYSTOLIC BLOOD PRESSURE: 102 MMHG | RESPIRATION RATE: 16 BRPM | HEART RATE: 72 BPM | WEIGHT: 138 LBS | DIASTOLIC BLOOD PRESSURE: 70 MMHG | BODY MASS INDEX: 26.22 KG/M2 | TEMPERATURE: 98.8 F

## 2024-02-08 DIAGNOSIS — N92.0 MENORRHAGIA WITH REGULAR CYCLE: Primary | ICD-10-CM

## 2024-02-08 DIAGNOSIS — Z30.430 ENCOUNTER FOR IUD INSERTION: ICD-10-CM

## 2024-02-08 LAB — HCG UR QL: NEGATIVE

## 2024-02-08 PROCEDURE — 99203 OFFICE O/P NEW LOW 30 MIN: CPT | Mod: 25 | Performed by: OBSTETRICS & GYNECOLOGY

## 2024-02-08 PROCEDURE — 81025 URINE PREGNANCY TEST: CPT | Performed by: OBSTETRICS & GYNECOLOGY

## 2024-02-08 PROCEDURE — 58300 INSERT INTRAUTERINE DEVICE: CPT | Performed by: OBSTETRICS & GYNECOLOGY

## 2024-02-08 NOTE — PROGRESS NOTES
GYN Progress Note     CC: Heavy menstrual bleeding     HISTORY OF PRESENT ILLNESS:    Lynn Vaca is a 28 year old  who presents for evaluation of heavy menstrual bleeding. She reports heavy bleeding since onset of menarche, typically having to double up pads overnight and then change every 4-6 hours during the day. Periods are very regular, denies prolonged bleeding or spotting. She did try OCPs briefly but had acute worsening of migraine headaches so had to stop. Denies significant dysmenorrhea. Lynn was evaluated for anemia by her PCP and will be undergoing an endoscopy to rule out GI bleeding but they also discussed options for management of HMB and Lynn is most interested in the Mirena IUD.       OB HISTORY:  OB History    Para Term  AB Living   0 0 0 0 0 0   SAB IAB Ectopic Multiple Live Births   0 0 0 0 0            GYN HISTORY:  Denies hx of abnormal pap smears, last pap was NIL in 2022   Not sexually active and hasn't been in the past, identifies as asexual      PAST MEDICAL HISTORY:  Past Medical History:   Diagnosis Date    ADHD (attention deficit hyperactivity disorder)     has been on concerta, strattera, and daytrana    Asperger's syndrome     Common migraine 2013    Eczema     Generalised anxiety disorder     Inguinal hernia without mention of obstruction or gangrene, unilateral or unspecified, (not specified as recurrent)     Right    OCD (obsessive compulsive disorder)     was on luvox as a child    Oppositional defiant disorder of childhood or adolescence          PAST SURGICAL HISTORY:       CURRENT MEDICATIONS:   Current Outpatient Medications   Medication Sig Dispense Refill    ferrous gluconate (FERGON) 324 (38 Fe) MG tablet Take 1 tablet (324 mg) by mouth daily (with breakfast) 90 tablet 0    venlafaxine (EFFEXOR XR) 150 MG 24 hr capsule Take 150 mg by mouth daily              ALLERGIES:  Bactrim [sulfamethoxazole w-trimethoprim]         SOCIAL  HISTORY:  Social History     Tobacco Use    Smoking status: Never     Passive exposure: Never    Smokeless tobacco: Never   Vaping Use    Vaping Use: Never used   Substance Use Topics    Alcohol use: No    Drug use: No            FAMILY HISTORY:  Family History   Problem Relation Age of Onset    Arthritis Mother         at age 22    Diabetes Maternal Grandmother         type II            REVIEW OF SYSTEMS:  See HPI      PHYSICAL EXAMINATION:  VS:/70 (BP Location: Right arm, Patient Position: Sitting, Cuff Size: Adult Regular)   Pulse 72   Temp 98.8  F (37.1  C)   Resp 16   Wt 62.6 kg (138 lb)   LMP 01/31/2024 (Approximate)   BMI 26.22 kg/m    Body mass index is 26.22 kg/m .    General: Patient alert and oriented, no acute distress  CV: no peripheral edema or cyanosis  Resp: normal respiratory effort and equal lung expansion  Abdomen: non-tender to light and deep palpation, no masses, organomegaly or hernia  : normal external genitalia without lesions. Urethral meatus normal, urethra and bladder non-tender to palpation. Vaginal mucosa normal in appearance without lesions, scant physiologic discharge. Cervix appears grossly normal.  Uterus normal size and contour, non-tender. No adnexal fullness or masses present.  Ext: non-tender, no edema    Mirena IUD Insertion Procedure Note    The patient was counseled that she may have irregular bleeding for the first 3-6 months. She understands that 70% of women are oligomenorrheic and 30-40% are amenorrheic within 2 years. The patient was informed that the Mirena is FDA approved for 5 years however the IUD can be removed at any time. We reviewed risks of uterine perforation and she was made aware that the risk of uterine perforation that might require abdominal surgery is 1/1,000 insertions. She understands that rate of expulsion in the first year of use is 2-10%. There is also a 1% risk of infection in the first 20 days after insertion. We reviewed that the IUD  is a very effective form of birth control however IUDs do have a low risk of failure to prevent pregnancy, she is not planning to rely on Mirena for contraception.  After thorough counseling regarding risks and benefits of the IUD, at which time the patient's questions were answered to her apparent satisfaction, consent for insertion was obtained.    A timeout was performed and the correct patient and procedure were verified.    A bimanual exam was performed and the uterus was found to be anteverted.  A speculum exam was performed and the cervix was visualized and prepped with betadine.  A single tooth tenaculum was placed on the anterior lip of the cervix.  The uterus sounded to 7.5 cm.  The Mirena IUD was placed without difficulty using standard insertion technique. The strings were trimmed to about 4 cm long. The patient tolerated the procedure well. EBL minimal.          Laboratory values:  Imaging findings:        ASSESSMENT:  Lynn Vaca is a 28 year old  who presents for evaluation of heavy menstrual bleeding       PLAN:  (N92.0) Menorrhagia with regular cycle  (primary encounter diagnosis)  -We discussed etiologies of heavy menstrual bleeding and that the Levonorgestrel IUD results in significant reduction in blood loss for the majority of patiens.   -Pelvic US ordered to rule out structural abnormalities   -VWB testing also ordered  -Patient following with PCP for treatment of anemia.     (Z30.193) Encounter for IUD insertion  The patient was instructed to call if she experiences heavy bleeding, abdominal pain, or any other new concerns. The patient voiced understanding.  Plan: HCG Qual, Urine ([id number]), levonorgestrel         (MIRENA) 52 MG (20 mcg/day) IUD 1 each,         INSERTION INTRAUTERINE DEVICE      Dispo: RTC for US and follow up  Jackie Vernon MD

## 2024-02-08 NOTE — PATIENT INSTRUCTIONS

## 2024-03-13 ENCOUNTER — ANCILLARY PROCEDURE (OUTPATIENT)
Dept: ULTRASOUND IMAGING | Facility: CLINIC | Age: 29
End: 2024-03-13
Attending: OBSTETRICS & GYNECOLOGY
Payer: COMMERCIAL

## 2024-03-13 ENCOUNTER — OFFICE VISIT (OUTPATIENT)
Dept: OBGYN | Facility: CLINIC | Age: 29
End: 2024-03-13
Attending: OBSTETRICS & GYNECOLOGY
Payer: COMMERCIAL

## 2024-03-13 VITALS
DIASTOLIC BLOOD PRESSURE: 79 MMHG | WEIGHT: 142 LBS | HEART RATE: 69 BPM | BODY MASS INDEX: 26.98 KG/M2 | TEMPERATURE: 97.8 F | RESPIRATION RATE: 16 BRPM | SYSTOLIC BLOOD PRESSURE: 117 MMHG

## 2024-03-13 DIAGNOSIS — N93.9 ABNORMAL UTERINE BLEEDING (AUB): Primary | ICD-10-CM

## 2024-03-13 DIAGNOSIS — N92.0 MENORRHAGIA WITH REGULAR CYCLE: ICD-10-CM

## 2024-03-13 PROCEDURE — 76856 US EXAM PELVIC COMPLETE: CPT | Performed by: OBSTETRICS & GYNECOLOGY

## 2024-03-13 PROCEDURE — 76830 TRANSVAGINAL US NON-OB: CPT | Performed by: OBSTETRICS & GYNECOLOGY

## 2024-03-13 PROCEDURE — 99212 OFFICE O/P EST SF 10 MIN: CPT | Performed by: OBSTETRICS & GYNECOLOGY

## 2024-03-13 NOTE — PROGRESS NOTES
GYN Progress Note     CC: Follow up for AUB    HPI: Lynn Vaca is a 28 year old  who presents to clinic for follow up due to a history of AUB. She was last seen for this concern in 24 and had an Mirena IUD placed. She had an ultrasound today to rule out structural abnormalities and also to confirm proper IUD location. Since IUD placement, Lynn has had daily light bleeding that waxes and wanes but denies pelvic pain or other concerns.     O: /79 (BP Location: Right arm, Patient Position: Sitting, Cuff Size: Adult Regular)   Pulse 69   Temp 97.8  F (36.6  C)   Resp 16   Wt 64.4 kg (142 lb)   LMP 2024 (Approximate)   BMI 26.98 kg/m      General: Patient alert and oriented, no acute distress  CV: no peripheral edema or cyanosis  Resp: normal respiratory effort and equal lung expansion  Ext: non-tender, no edema    Pelvic US   Final report pending but images reviewed and normal uterine contour noted with no adnexal masses, IUD properly positioned at uterine fundus     Assessment and plan:   Lynn Vaca is a 28 year old  who presents to clinic for follow up due to a history of AUB  (N93.9) Abnormal uterine bleeding (AUB)  (primary encounter diagnosis)  -Patient has had daily bleeding since IUD placement 6 weeks ago but we discussed that continued or intermittent light spotting can continue for the first 3-6 months   -She is comfortable with the plan to continue to monitor bleeding for the next few months to see if bleeding profile improves.     Dispo: RTC as needed     Jackie Vernon MD

## 2024-03-16 ENCOUNTER — HEALTH MAINTENANCE LETTER (OUTPATIENT)
Age: 29
End: 2024-03-16

## 2024-04-03 ENCOUNTER — TELEPHONE (OUTPATIENT)
Dept: GASTROENTEROLOGY | Facility: CLINIC | Age: 29
End: 2024-04-03
Payer: COMMERCIAL

## 2024-04-03 NOTE — TELEPHONE ENCOUNTER
Caller: Lynn Vaca      Reason for Reschedule/Cancellation   (please be detailed, any staff messages or encounters to note?): pt can't afford procedures      Prior to reschedule please review:  Ordering Provider:     GIGI GOODRICH     Sedation Determined: moderate  Does patient have any ASC Exclusions, please identify?: N      Notes on Cancelled Procedure:  Procedure: Lower Endoscopy [Colonoscopy]   Date: 04/18/2024  Location: [facility]; [address] [place] [address], [address] Floor, [place] [address]   Surgeon: AMANDA      Rescheduled: No,         Did you cancel or rescheduled an EUS procedure? No.

## 2024-04-03 NOTE — TELEPHONE ENCOUNTER
Pre assessment completed for upcoming procedure.   (Please see previous telephone encounter notes for complete details)    Patient  returned call.       Procedure details:    Arrival time and facility location reviewed.    Pre op exam needed? N/A    Designated  policy reviewed. Instructed to have someone stay 6  hours post procedure.       Medication review:    Ferrous Sulfate (iron supplement): HOLD 7 days before procedure.  NSAID medication(s): Ibuprofen (Advil, Motrin): HOLD 1 day before procedure.      Prep for procedure:     Procedure prep instructions reviewed.        Any additional information needed:  N/A      Patient  verbalized understanding and had no questions or concerns at this time.      Sulma Hillman RN  Endoscopy Procedure Pre Assessment RN  [phone] option 4

## 2024-04-03 NOTE — TELEPHONE ENCOUNTER
Pre visit planning completed.      Procedure details:    Patient scheduled for Colonoscopy/Upper endoscopy (EGD) on 4/18/2024.     Arrival time: 1245. Procedure time 1345    Facility location: [facility]; [address] [place] [address], 3[address] Floor, [place] [address]. Check in location: Main entrance at registration desk.    Sedation type: Conscious sedation     Pre op exam needed? N/A    Indication for procedure:   Iron deficiency anemia due to chronic blood loss [D50.0]      Hematemesis with nausea [K92.0]      Hematochezia [K92.1]            Chart review:     Electronic implanted devices? No    Recent diagnosis of diverticulitis within the last 6 weeks? No    Diabetic? No      Medication review:    Anticoagulants? No    NSAIDS? No NSAID medications per patient's medication list.  RN will verify with pre-assessment call.    Other medication HOLDING recommendations:  Ferrous sulfate (iron supplements): HOLD 7 days before procedure.      Prep for procedure:     Bowel prep recommendation: Standard Miralax  Due to: standard bowel prep.    Prep instructions sent via [employer]         Sulma Hillman RN  Endoscopy Procedure Pre Assessment RN  [phone] option 4

## 2024-06-13 ENCOUNTER — NURSE TRIAGE (OUTPATIENT)
Dept: FAMILY MEDICINE | Facility: CLINIC | Age: 29
End: 2024-06-13
Payer: COMMERCIAL

## 2024-06-13 NOTE — TELEPHONE ENCOUNTER
Left message on patient's voicemail to call back and speak with a triage nurse.     CHAD BoltonN RN  [facility]

## 2024-06-13 NOTE — TELEPHONE ENCOUNTER
Reason for Call:  Appointment Request    Patient requesting this type of appt:  Acute to discuss spots appearing on body and ongoing sore throat    Requested provider:  Any provider    Reason patient unable to be scheduled: Not within requested timeframe    When does patient want to be seen/preferred time: 3-7 days    Comments: Does not want to wait for next openings. States she has spots appearing on her body, she does not know where they are coming from and has an ongoing sore throat she would like treated    [facility] or would you prefer to receive a phone call?:   Patient would prefer a phone call   Okay to leave a detailed message?: Yes at Home number on file [phone] (home)    Call taken on 6/13/2024 at 9:38 AM by Ilene Christianson

## 2024-06-13 NOTE — TELEPHONE ENCOUNTER
Patient called back.    Per patient:  Bug bites-worsening  Wonders if bed bugs?  Larger than mosquito bites  Present for 1 week  Bites on ribcage, breasts, legs, stomach  Bites/bumps are raised  No edema otherwise  No bed bug excrement on bedding/mattress seams  No small blood stains on bedding  Itching  Moderate  No bleeding from these areas/bites  No fever  No discharge/drainage from these sites  Sore throat present x 2 days; no known sick contacts  Thinks might be a cold  Parent does not have any bug bites and lives in same residence as patient    Writer recommended clinician visit and offered to schedule video visit at [facility] with SHIRAZ Leggett PA-C, today; patient declined due to schedule conflict.    Writer recommended Urgent Care visit; reviewed [facility] hours-no appt necessary and hours are [phone], please arrive by 1800 as they often reach capacity by that time of day.    Patient verbalized understanding and in agreement with plan.    AL Steve, RN-BC  [facility]    Reason for Disposition   Diagnosis of bed bug bites is uncertain    Additional Information   Negative: Anaphylactic reaction suspected (e.g., sudden onset of difficulty breathing, difficulty swallowing, wheezing following bite)   Negative: Sounds like a life-threatening emergency to the triager   Negative: Widespread hives   Negative: Impetigo suspected (i.e., painless infected superficial small sores, < 1 inch or 2.5 cm, often covered by a soft, yellow-brown scab or crust; sometimes occurring near nasal openings)   Negative: Other insect bite suspected   Negative: Doesn't match the SYMPTOMS of bed bug bites   Negative: Patient sounds very sick or weak to the triager   Negative: [1] Fever AND [2] red area   Negative: [1] Fever AND [2] area is very tender to touch   Negative: [1] Red streak or red line AND [2] length > 2 inches (5 cm)   Negative: [1] Red or very tender (to touch) area AND [2]  started over 24 hours after the bite   Negative: [1] Red or very tender (to touch) area AND [2] getting larger over 48 hours after the bite   Negative: [1] SEVERE local itching (i.e., interferes with work, school, sleep) AND [2] not improved after 24 hours of hydrocortisone cream   Negative: [1] After 7 days AND [2] rash from bed bug bites is not improving   Negative: [1] After 14 days AND [2] bed bug bite isn't healed    Protocols used: Bed Bug Bite-A-

## 2024-07-24 ENCOUNTER — TELEPHONE (OUTPATIENT)
Dept: FAMILY MEDICINE | Facility: CLINIC | Age: 29
End: 2024-07-24

## 2024-07-24 ENCOUNTER — VIRTUAL VISIT (OUTPATIENT)
Dept: FAMILY MEDICINE | Facility: CLINIC | Age: 29
End: 2024-07-24
Payer: COMMERCIAL

## 2024-07-24 DIAGNOSIS — L71.9 ROSACEA: ICD-10-CM

## 2024-07-24 DIAGNOSIS — F33.0 MILD RECURRENT MAJOR DEPRESSION (H): ICD-10-CM

## 2024-07-24 DIAGNOSIS — L71.9 ROSACEA: Primary | ICD-10-CM

## 2024-07-24 DIAGNOSIS — F41.1 GENERALIZED ANXIETY DISORDER: ICD-10-CM

## 2024-07-24 PROCEDURE — 99213 OFFICE O/P EST LOW 20 MIN: CPT | Mod: 95 | Performed by: FAMILY MEDICINE

## 2024-07-24 PROCEDURE — 96127 BRIEF EMOTIONAL/BEHAV ASSMT: CPT | Mod: 95 | Performed by: FAMILY MEDICINE

## 2024-07-24 PROCEDURE — G2211 COMPLEX E/M VISIT ADD ON: HCPCS | Mod: 95 | Performed by: FAMILY MEDICINE

## 2024-07-24 RX ORDER — LAMOTRIGINE 25 MG/1
25 TABLET ORAL DAILY
COMMUNITY
Start: 2024-07-05

## 2024-07-24 ASSESSMENT — PATIENT HEALTH QUESTIONNAIRE - PHQ9
SUM OF ALL RESPONSES TO PHQ QUESTIONS 1-9: 10
SUM OF ALL RESPONSES TO PHQ QUESTIONS 1-9: 10
10. IF YOU CHECKED OFF ANY PROBLEMS, HOW DIFFICULT HAVE THESE PROBLEMS MADE IT FOR YOU TO DO YOUR WORK, TAKE CARE OF THINGS AT HOME, OR GET ALONG WITH OTHER PEOPLE: NOT DIFFICULT AT ALL

## 2024-07-24 ASSESSMENT — COLUMBIA-SUICIDE SEVERITY RATING SCALE - C-SSRS
1. WITHIN THE PAST MONTH, HAVE YOU WISHED YOU WERE DEAD OR WISHED YOU COULD GO TO SLEEP AND NOT WAKE UP?: YES
2. IN THE PAST MONTH, HAVE YOU ACTUALLY HAD ANY THOUGHTS OF KILLING YOURSELF?: NO
6. HAVE YOU EVER DONE ANYTHING, STARTED TO DO ANYTHING, OR PREPARED TO DO ANYTHING TO END YOUR LIFE?: NO

## 2024-07-24 NOTE — TELEPHONE ENCOUNTER
[employer]VA insurance ([facility]) will not cover Noritate 1% cream without a prior authorization.  It will cover metronidazole 0.75% cream or metronidazole 1% gel.  Please send a new rx to my pharmacy.  Darwin Saucedo RPh.  [facility]   [phone]  [email or web address]

## 2024-07-24 NOTE — PROGRESS NOTES
Lynn is a 28 year old who is being evaluated via a billable video visit.          Assessment & Plan     Rosacea  See patient instructions.  Discussed common triggers of flushing that can worsen the redness: hot weather, hot beverages, alcohol, spicy foods   - metroNIDAZOLE (NORITATE) 1 % external cream  Dispense: 60 g; Refill: 5    Mild recurrent major depression (H24)  Generalized anxiety disorder  Discussed that I would consider taking over the prescribing of her mental health medications but need to review her outside mental health records first.  She will sign a FREDDIE for her mental health clinic to forward me her records.        Depression Screening Follow Up      7/24/2024    10:47 AM   C-SSRS (Brief [place])   Within the last month, have you wished you were dead or wished you could go to sleep and not wake up? Yes   Within the last month, have you had any actual thoughts of killing yourself? No   Within the last month, have you ever done anything, started to do anything, or prepared to do anything to end your life? No   Stable passive SI - her mother and her dog are protective factors.    Follow Up Actions Taken  Crisis resource information provided in the After Visit Summary  Referred patient back to mental health provider      The longitudinal plan of care for the diagnosis(es)/condition(s) as documented were addressed during this visit. Due to the added complexity in care, I will continue to support Lynn in the subsequent management and with ongoing continuity of care.    See Patient Instructions    Subjective   Lynn is a 28 year old, presenting for the following health issues:  RECHECK (Follow Up - Rash )      7/24/2024     9:48 AM   Additional Questions   Roomed by Monet Wilkerson     History of Present Illness       Reason for visit:  Face rash and insurance questions  Symptom onset:  More than a month  Symptoms include:  Red rash on cheeks and bridge of nose  Symptom intensity:  Mild  Symptom  progression:  Staying the same  Had these symptoms before:  No  What makes it worse:  No  What makes it better:  Lotion sometimes does    She eats 0-1 servings of fruits and vegetables daily.She consumes 1 sweetened beverage(s) daily.She exercises with enough effort to increase her heart rate 60 or more minutes per day.  She exercises with enough effort to increase her heart rate 5 days per week.   She is taking medications regularly.    1) Facial rash - see above    2) Mental Health  Received a letter from insurance that they won't cover her psychiatry visits (at outside mental health clinic) any longer.   She does not do therapy through that clinic - she's done therapy through [facility].  She is on venlafaxine and lamictal prescribed through psychiatry.    I have not been receiving her mental health records.        11/22/2023    10:02 AM 1/11/2024    11:33 AM 7/24/2024    10:22 AM   PHQ   PHQ-9 Total Score 9 7 10   Q9: Thoughts of better off dead/self-harm past 2 weeks Not at all Not at all More than half the days   F/U: Thoughts of suicide or self-harm   No   F/U: Safety concerns   No         6/28/2023    10:19 AM 8/2/2023    11:21 AM 10/23/2023    10:22 AM   PALAK-7 SCORE   Total Score 9 (mild anxiety) 11 (moderate anxiety) 12 (moderate anxiety)   Total Score 9 11 12           7/24/2024    10:22 AM   Last PHQ-9   1.  Little interest or pleasure in doing things 1   2.  Feeling down, depressed, or hopeless 2   3.  Trouble falling or staying asleep, or sleeping too much 0   4.  Feeling tired or having little energy 2   5.  Poor appetite or overeating 0   6.  Feeling bad about yourself 3   7.  Trouble concentrating 0   8.  Moving slowly or restless 0   Q9: Thoughts of better off dead/self-harm past 2 weeks 2   PHQ-9 Total Score 10   In the past two weeks have you had thoughts of suicide or self harm? No   Do you have concerns about your personal safety or the safety of others? No         10/23/2023    10:22 AM   PALAK-7     1. Feeling nervous, anxious, or on edge 3   2. Not being able to stop or control worrying 3   3. Worrying too much about different things 3   4. Trouble relaxing 0   5. Being so restless that it is hard to sit still 0   6. Becoming easily annoyed or irritable 2   7. Feeling afraid, as if something awful might happen 1   PALAK-7 Total Score 12   If you checked any problems, how difficult have they made it for you to do your work, take care of things at home, or get along with other people? Not difficult at all             Objective         Vitals:  No vitals were obtained today due to virtual visit.    Physical Exam   GENERAL: alert and no distress  EYES: Eyes grossly normal to inspection.  No discharge or erythema, or obvious scleral/conjunctival abnormalities.  RESP: No audible wheeze, cough, or visible cyanosis.    SKIN: Visible skin clear. No significant rash, abnormal pigmentation or lesions.  NEURO: Cranial nerves grossly intact.  Mentation and speech appropriate for age.  PSYCH: Appropriate affect, tone, and pace of words        Video-Visit Details    Type of service:  Video Visit   10:32 AM  10:50 AM   Originating Location (pt. Location): Home  Distant Location (provider location):  Off-site  Platform used for Video Visit: Delicia  Signed Electronically by: Zulema Valdez MD

## 2024-07-28 NOTE — TELEPHONE ENCOUNTER
Does provider want to change medication to a formulary covered alternative? (See previous notes from tim) If so please send pharmacy new rx     If not please route back to me and I will submit PA for originally prescribed medication.     Thanks,     Micki Nieves Prior Authorization Team  P: [phone]

## 2024-07-29 NOTE — TELEPHONE ENCOUNTER
Dr. Valdez please review and advise if you would like to send alternative med in or have PA team complete? Thanks!

## 2024-08-14 ENCOUNTER — VIRTUAL VISIT (OUTPATIENT)
Dept: BEHAVIORAL HEALTH | Facility: CLINIC | Age: 29
End: 2024-08-14
Payer: COMMERCIAL

## 2024-08-14 DIAGNOSIS — F84.5 ASPERGER'S DISORDER: Primary | ICD-10-CM

## 2024-08-14 PROCEDURE — 90832 PSYTX W PT 30 MINUTES: CPT | Mod: 95 | Performed by: SOCIAL WORKER

## 2024-08-14 ASSESSMENT — PATIENT HEALTH QUESTIONNAIRE - PHQ9
10. IF YOU CHECKED OFF ANY PROBLEMS, HOW DIFFICULT HAVE THESE PROBLEMS MADE IT FOR YOU TO DO YOUR WORK, TAKE CARE OF THINGS AT HOME, OR GET ALONG WITH OTHER PEOPLE: SOMEWHAT DIFFICULT
SUM OF ALL RESPONSES TO PHQ QUESTIONS 1-9: 11
SUM OF ALL RESPONSES TO PHQ QUESTIONS 1-9: 11

## 2024-08-14 ASSESSMENT — ANXIETY QUESTIONNAIRES
8. IF YOU CHECKED OFF ANY PROBLEMS, HOW DIFFICULT HAVE THESE MADE IT FOR YOU TO DO YOUR WORK, TAKE CARE OF THINGS AT HOME, OR GET ALONG WITH OTHER PEOPLE?: SOMEWHAT DIFFICULT
GAD7 TOTAL SCORE: 12
GAD7 TOTAL SCORE: 12
IF YOU CHECKED OFF ANY PROBLEMS ON THIS QUESTIONNAIRE, HOW DIFFICULT HAVE THESE PROBLEMS MADE IT FOR YOU TO DO YOUR WORK, TAKE CARE OF THINGS AT HOME, OR GET ALONG WITH OTHER PEOPLE: SOMEWHAT DIFFICULT
GAD7 TOTAL SCORE: 12
7. FEELING AFRAID AS IF SOMETHING AWFUL MIGHT HAPPEN: MORE THAN HALF THE DAYS
4. TROUBLE RELAXING: SEVERAL DAYS
7. FEELING AFRAID AS IF SOMETHING AWFUL MIGHT HAPPEN: MORE THAN HALF THE DAYS
3. WORRYING TOO MUCH ABOUT DIFFERENT THINGS: NEARLY EVERY DAY
6. BECOMING EASILY ANNOYED OR IRRITABLE: NOT AT ALL
1. FEELING NERVOUS, ANXIOUS, OR ON EDGE: NEARLY EVERY DAY
2. NOT BEING ABLE TO STOP OR CONTROL WORRYING: NEARLY EVERY DAY
5. BEING SO RESTLESS THAT IT IS HARD TO SIT STILL: NOT AT ALL
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
## 2024-09-04 NOTE — PATIENT INSTRUCTIONS
Get benadryl and take 50 mg once you get home.    For future migraines, try the sumatriptan.  Take 25 mg at earliest sign of headache.  You can repeat the dose every 2 hours if headache persists - up to 8 tabs in 24 hours.  If you find that you need a higher dose we can prescribe a 50 mg tablet in the future.    Track your headaches and look for patterns of potential triggers.      Did you know?   I do telehealth (video) visits exclusively on Wednesdays.  I still do in-person visits at [facility] ([phone]) on Mondays, Tuesdays and Thursdays.  You can schedule a video visit for follow-up appointments as well as future appointments for certain conditions.  Please see the below link.  [email or web address]    If you have not already done so,  I encourage you to sign up for [facility] ([email or web address]).  This will allow you to review your results, securely communicate with your provider care team, and schedule virtual visits as well.    To schedule any ordered imaging studies you can call [facility] at [phone].  If you are scheduling a DEXA (bone density) scan please do NOT schedule this at the [facility].  Please ask that it be done at [facility] in [place].  Other preferred DEXA locations include [place] (at the [facility]), [place], or [place].        Patient Education   About Migraine Headaches  What is a migraine headache?  A migraine is a very painful type of headache. It may last a few hours or days. During a migraine, you may have vision problems and feel sick to your stomach.  Migraines are three times more common in women than in men. Once they start, you may get them for the rest of your life. They may occur less often as you age.  What causes it?  We don't know the exact cause, but many things can trigger a migraine. These  include:    Stress and anxiety    Lack of food or sleep    Foods and drinks that contain tyramine, such as:  ? Red ashlee and some beers  ? Aged cheeses (like cheddar or blue cheese)  ? Yeast  ? Aged, dried or cured meats  ? Fermented foods like sauerkraut, soy sauce, miso and selwyn chi    Too much light    Chemicals (gasoline, cleaning products, perfume, glue, etc.)    Weather changes    Certain medicines    Hormone changes (in women).  What are symptoms?  Some people can tell when they're about to have a migraine. They may see flashing lights or zigzag lines in front of their eyes. Or they may lose their vision for a short time.  With a migraine, you may:    Feel pain or pulsing on one side of the head. For some people, the entire head hurts.    Be very sensitive to light and sound.    Feel nauseated (sick to your stomach) and vomit (throw up).  How is it treated?  Your care team may suggest medicine to prevent or relieve your symptoms. Once you start having migraines, you may also need medicine to keep them from getting worse.   Take your medicine at the first sign of a migraine. It may take several tries to find a medicine that works for you.   When a migraine comes:    Lie down in a quiet, dark room. Try not to bend over, as this may cause more pain.    Put a cold pack on your head. Try a bag of frozen vegetables, wrapped with a thin cloth.    Drink extra fluids. If you can't drink, suck on ice chips.  How can I prevent migraines?  It will help to keep a migraine diary. By keeping a diary, you may find the cause of your headaches. Once you know the cause, you can take steps to prevent migraines in the future.  It also helps to live a healthy lifestyle. This means:    Get regular exercise. (If exercise triggers your headaches, tell your doctor.)    Drink plenty of water.    Eat healthy meals at regular times.    Try to go to bed and get up and regular times.    Don't smoke. Avoid second-hand smoke.    Avoid  caffeine. Coffee, tea and soda may help a migraine. But if you drink them too often, they can cause migraines.    Find ways to relax, have fun and reduce stress in your life.    Try complementary therapies (yoga, acupuncture, massage, biofeedback, etc.).  When should I call my clinic?  Call your clinic at once if you have new or unusual symptoms, such as:     Pain that gets worse or lasts more than 24 hours.    Slurred speech or problems talking.    A weak arm or leg that you can't move normally.    A fever over 100 F (37.8 C), taken under the tongue.    Stiff neck.    Vomiting (throwing up) for several hours.  For more information about migraines  Contact the [employer] ([employer]) at 1-262.525.9632 or www.headaches.org.  Local providers of complementary therapies  These services may not be covered by insurance. Check your insurance plan.  [facility]  [phone]   Includes pain education, behavioral therapy,   trigger point injections and more.  [facility]   [phone]   Includes acupuncture, massage, myofascial release.  [facility]  [phone]  [email or web address]  Includes mindfulness, meditation, yoga.  Community Education     [place]: [email or web address].[email or web address].mn.[email or web address]: commedprograms.[email or web address].org  Look for programs on yoga, mindfulness, etc.  [facility]: [facility]   [phone]  [email or web address]  Includes mindfulness, yoga, body-mind skills.  For informational purposes only. Not to replace the advice of your health care provider.   Copyright   2011 [facility]. All rights reserved. [employer] 940463 - 11/15.        [Hypertension] : hypertension [Atrial Fibrillation] : atrial fibrillation

## 2024-11-09 ASSESSMENT — PATIENT HEALTH QUESTIONNAIRE - PHQ9: SUM OF ALL RESPONSES TO PHQ QUESTIONS 1-9: 9

## 2025-03-22 ENCOUNTER — HEALTH MAINTENANCE LETTER (OUTPATIENT)
Age: 30
End: 2025-03-22